# Patient Record
Sex: MALE | Race: WHITE | NOT HISPANIC OR LATINO | Employment: UNEMPLOYED | URBAN - METROPOLITAN AREA
[De-identification: names, ages, dates, MRNs, and addresses within clinical notes are randomized per-mention and may not be internally consistent; named-entity substitution may affect disease eponyms.]

---

## 2019-03-06 ENCOUNTER — APPOINTMENT (EMERGENCY)
Dept: CT IMAGING | Facility: HOSPITAL | Age: 39
DRG: 720 | End: 2019-03-06
Payer: COMMERCIAL

## 2019-03-06 ENCOUNTER — HOSPITAL ENCOUNTER (INPATIENT)
Facility: HOSPITAL | Age: 39
LOS: 5 days | DRG: 720 | End: 2019-03-12
Attending: EMERGENCY MEDICINE | Admitting: INTERNAL MEDICINE
Payer: COMMERCIAL

## 2019-03-06 ENCOUNTER — APPOINTMENT (EMERGENCY)
Dept: RADIOLOGY | Facility: HOSPITAL | Age: 39
DRG: 720 | End: 2019-03-06
Payer: COMMERCIAL

## 2019-03-06 DIAGNOSIS — R78.81 POSITIVE BLOOD CULTURES: ICD-10-CM

## 2019-03-06 DIAGNOSIS — I21.4 NSTEMI (NON-ST ELEVATED MYOCARDIAL INFARCTION) (HCC): ICD-10-CM

## 2019-03-06 DIAGNOSIS — J69.0 ASPIRATION PNEUMONIA (HCC): ICD-10-CM

## 2019-03-06 DIAGNOSIS — I26.99 PULMONARY EMBOLUS (HCC): ICD-10-CM

## 2019-03-06 DIAGNOSIS — F19.10 POLYSUBSTANCE ABUSE (HCC): Primary | ICD-10-CM

## 2019-03-06 LAB
ALBUMIN SERPL BCP-MCNC: 3.7 G/DL (ref 3.5–5)
ALP SERPL-CCNC: 82 U/L (ref 46–116)
ALT SERPL W P-5'-P-CCNC: 119 U/L (ref 12–78)
AMPHETAMINES SERPL QL SCN: POSITIVE
ANION GAP BLD CALC-SCNC: 19 MMOL/L (ref 4–13)
ANION GAP SERPL CALCULATED.3IONS-SCNC: 13 MMOL/L (ref 4–13)
APAP SERPL-MCNC: <2 UG/ML (ref 10–30)
APTT PPP: 31 SECONDS (ref 26–38)
ARTERIAL PATENCY WRIST A: YES
AST SERPL W P-5'-P-CCNC: 108 U/L (ref 5–45)
ATRIAL RATE: 121 BPM
BACTERIA UR QL AUTO: ABNORMAL /HPF
BARBITURATES UR QL: NEGATIVE
BASE EXCESS BLDA CALC-SCNC: -0.8 MMOL/L
BASE EXCESS BLDA CALC-SCNC: -1 MMOL/L (ref -2–3)
BASOPHILS # BLD AUTO: 0.08 THOUSANDS/ΜL (ref 0–0.1)
BASOPHILS NFR BLD AUTO: 0 % (ref 0–1)
BENZODIAZ UR QL: NEGATIVE
BILIRUB SERPL-MCNC: 1.2 MG/DL (ref 0.2–1)
BILIRUB UR QL STRIP: NEGATIVE
BUN BLD-MCNC: 12 MG/DL (ref 5–25)
BUN SERPL-MCNC: 13 MG/DL (ref 5–25)
CA-I BLD-SCNC: 1.09 MMOL/L (ref 1.12–1.32)
CA-I BLD-SCNC: 1.1 MMOL/L (ref 1.12–1.32)
CALCIUM SERPL-MCNC: 8.8 MG/DL (ref 8.3–10.1)
CHLORIDE BLD-SCNC: 96 MMOL/L (ref 100–108)
CHLORIDE SERPL-SCNC: 98 MMOL/L (ref 100–108)
CK MB SERPL-MCNC: 8.5 NG/ML (ref 0–5)
CK MB SERPL-MCNC: <1 % (ref 0–2.5)
CK SERPL-CCNC: 1281 U/L (ref 39–308)
CLARITY UR: CLEAR
CO2 SERPL-SCNC: 25 MMOL/L (ref 21–32)
COARSE GRAN CASTS URNS QL MICRO: ABNORMAL /LPF
COCAINE UR QL: NEGATIVE
COLOR UR: YELLOW
CREAT BLD-MCNC: 1.1 MG/DL (ref 0.6–1.3)
CREAT SERPL-MCNC: 1.29 MG/DL (ref 0.6–1.3)
EOSINOPHIL # BLD AUTO: 0.68 THOUSAND/ΜL (ref 0–0.61)
EOSINOPHIL NFR BLD AUTO: 3 % (ref 0–6)
ERYTHROCYTE [DISTWIDTH] IN BLOOD BY AUTOMATED COUNT: 13.3 % (ref 11.6–15.1)
ETHANOL SERPL-MCNC: <3 MG/DL (ref 0–3)
GFR SERPL CREATININE-BSD FRML MDRD: 70 ML/MIN/1.73SQ M
GFR SERPL CREATININE-BSD FRML MDRD: 85 ML/MIN/1.73SQ M
GLUCOSE SERPL-MCNC: 91 MG/DL (ref 65–140)
GLUCOSE SERPL-MCNC: 93 MG/DL (ref 65–140)
GLUCOSE SERPL-MCNC: 96 MG/DL (ref 65–140)
GLUCOSE UR STRIP-MCNC: NEGATIVE MG/DL
HCO3 BLDA-SCNC: 25.1 MMOL/L (ref 22–28)
HCO3 BLDA-SCNC: 25.9 MMOL/L (ref 24–30)
HCT VFR BLD AUTO: 40.8 % (ref 36.5–49.3)
HCT VFR BLD CALC: 43 % (ref 36.5–49.3)
HCT VFR BLD CALC: 44 % (ref 36.5–49.3)
HGB BLD-MCNC: 13.8 G/DL (ref 12–17)
HGB BLDA-MCNC: 14.6 G/DL (ref 12–17)
HGB BLDA-MCNC: 15 G/DL (ref 12–17)
HGB UR QL STRIP.AUTO: ABNORMAL
HYALINE CASTS #/AREA URNS LPF: ABNORMAL /LPF
IMM GRANULOCYTES # BLD AUTO: 0.18 THOUSAND/UL (ref 0–0.2)
IMM GRANULOCYTES NFR BLD AUTO: 1 % (ref 0–2)
INR PPP: 1.15 (ref 0.86–1.17)
KETONES UR STRIP-MCNC: NEGATIVE MG/DL
LEUKOCYTE ESTERASE UR QL STRIP: NEGATIVE
LYMPHOCYTES # BLD AUTO: 4.68 THOUSANDS/ΜL (ref 0.6–4.47)
LYMPHOCYTES NFR BLD AUTO: 23 % (ref 14–44)
MAGNESIUM SERPL-MCNC: 1.8 MG/DL (ref 1.6–2.6)
MCH RBC QN AUTO: 30.3 PG (ref 26.8–34.3)
MCHC RBC AUTO-ENTMCNC: 33.8 G/DL (ref 31.4–37.4)
MCV RBC AUTO: 90 FL (ref 82–98)
METHADONE UR QL: NEGATIVE
MONOCYTES # BLD AUTO: 1.36 THOUSAND/ΜL (ref 0.17–1.22)
MONOCYTES NFR BLD AUTO: 7 % (ref 4–12)
MUCOUS THREADS UR QL AUTO: ABNORMAL
NASAL CANNULA: 2
NEUTROPHILS # BLD AUTO: 13.22 THOUSANDS/ΜL (ref 1.85–7.62)
NEUTS SEG NFR BLD AUTO: 66 % (ref 43–75)
NITRITE UR QL STRIP: NEGATIVE
NON-SQ EPI CELLS URNS QL MICRO: ABNORMAL /HPF
NRBC BLD AUTO-RTO: 0 /100 WBCS
O2 CT BLDA-SCNC: 18.4 ML/DL (ref 16–23)
OPIATES UR QL SCN: POSITIVE
OXYHGB MFR BLDA: 90.2 % (ref 94–97)
P AXIS: 33 DEGREES
PCO2 BLD: 26 MMOL/L (ref 21–32)
PCO2 BLD: 27 MMOL/L (ref 21–32)
PCO2 BLD: 52.6 MM HG (ref 42–50)
PCO2 BLDA: 46 MM HG (ref 36–44)
PCP UR QL: NEGATIVE
PH BLD: 7.3 [PH] (ref 7.3–7.4)
PH BLDA: 7.36 [PH] (ref 7.35–7.45)
PH UR STRIP.AUTO: 6 [PH]
PHOSPHATE SERPL-MCNC: 6.1 MG/DL (ref 2.7–4.5)
PLATELET # BLD AUTO: 241 THOUSANDS/UL (ref 149–390)
PMV BLD AUTO: 11.2 FL (ref 8.9–12.7)
PO2 BLD: 47 MM HG (ref 35–45)
PO2 BLDA: 72.3 MM HG (ref 75–129)
POTASSIUM BLD-SCNC: 3.4 MMOL/L (ref 3.5–5.3)
POTASSIUM BLD-SCNC: 3.4 MMOL/L (ref 3.5–5.3)
POTASSIUM SERPL-SCNC: 3.5 MMOL/L (ref 3.5–5.3)
PR INTERVAL: 128 MS
PROT SERPL-MCNC: 7.9 G/DL (ref 6.4–8.2)
PROT UR STRIP-MCNC: ABNORMAL MG/DL
PROTHROMBIN TIME: 14.6 SECONDS (ref 11.8–14.2)
QRS AXIS: 103 DEGREES
QRSD INTERVAL: 92 MS
QT INTERVAL: 346 MS
QTC INTERVAL: 491 MS
RBC # BLD AUTO: 4.55 MILLION/UL (ref 3.88–5.62)
RBC #/AREA URNS AUTO: ABNORMAL /HPF
SALICYLATES SERPL-MCNC: <3 MG/DL (ref 3–20)
SAO2 % BLD FROM PO2: 77 % (ref 95–98)
SODIUM BLD-SCNC: 135 MMOL/L (ref 136–145)
SODIUM BLD-SCNC: 136 MMOL/L (ref 136–145)
SODIUM SERPL-SCNC: 136 MMOL/L (ref 136–145)
SP GR UR STRIP.AUTO: 1.02 (ref 1–1.03)
SPECIMEN SOURCE: ABNORMAL
T WAVE AXIS: 10 DEGREES
THC UR QL: NEGATIVE
TROPONIN I SERPL-MCNC: 0.12 NG/ML
TSH SERPL DL<=0.05 MIU/L-ACNC: 8.32 UIU/ML (ref 0.36–3.74)
UROBILINOGEN UR QL STRIP.AUTO: 0.2 E.U./DL
VENTRICULAR RATE: 121 BPM
WBC # BLD AUTO: 20.2 THOUSAND/UL (ref 4.31–10.16)
WBC #/AREA URNS AUTO: ABNORMAL /HPF

## 2019-03-06 PROCEDURE — 36415 COLL VENOUS BLD VENIPUNCTURE: CPT | Performed by: PHYSICIAN ASSISTANT

## 2019-03-06 PROCEDURE — 80320 DRUG SCREEN QUANTALCOHOLS: CPT | Performed by: PHYSICIAN ASSISTANT

## 2019-03-06 PROCEDURE — 71045 X-RAY EXAM CHEST 1 VIEW: CPT

## 2019-03-06 PROCEDURE — 81001 URINALYSIS AUTO W/SCOPE: CPT | Performed by: PHYSICIAN ASSISTANT

## 2019-03-06 PROCEDURE — 84100 ASSAY OF PHOSPHORUS: CPT | Performed by: PHYSICIAN ASSISTANT

## 2019-03-06 PROCEDURE — 84295 ASSAY OF SERUM SODIUM: CPT

## 2019-03-06 PROCEDURE — 80307 DRUG TEST PRSMV CHEM ANLYZR: CPT | Performed by: PHYSICIAN ASSISTANT

## 2019-03-06 PROCEDURE — 87147 CULTURE TYPE IMMUNOLOGIC: CPT | Performed by: PHYSICIAN ASSISTANT

## 2019-03-06 PROCEDURE — 85025 COMPLETE CBC W/AUTO DIFF WBC: CPT | Performed by: PHYSICIAN ASSISTANT

## 2019-03-06 PROCEDURE — 93010 ELECTROCARDIOGRAM REPORT: CPT | Performed by: INTERNAL MEDICINE

## 2019-03-06 PROCEDURE — 85014 HEMATOCRIT: CPT

## 2019-03-06 PROCEDURE — 70450 CT HEAD/BRAIN W/O DYE: CPT

## 2019-03-06 PROCEDURE — 82330 ASSAY OF CALCIUM: CPT

## 2019-03-06 PROCEDURE — 87186 SC STD MICRODIL/AGAR DIL: CPT | Performed by: PHYSICIAN ASSISTANT

## 2019-03-06 PROCEDURE — 83735 ASSAY OF MAGNESIUM: CPT | Performed by: PHYSICIAN ASSISTANT

## 2019-03-06 PROCEDURE — 80329 ANALGESICS NON-OPIOID 1 OR 2: CPT | Performed by: PHYSICIAN ASSISTANT

## 2019-03-06 PROCEDURE — 36600 WITHDRAWAL OF ARTERIAL BLOOD: CPT

## 2019-03-06 PROCEDURE — 84132 ASSAY OF SERUM POTASSIUM: CPT

## 2019-03-06 PROCEDURE — 85730 THROMBOPLASTIN TIME PARTIAL: CPT | Performed by: PHYSICIAN ASSISTANT

## 2019-03-06 PROCEDURE — 96375 TX/PRO/DX INJ NEW DRUG ADDON: CPT

## 2019-03-06 PROCEDURE — 85610 PROTHROMBIN TIME: CPT | Performed by: PHYSICIAN ASSISTANT

## 2019-03-06 PROCEDURE — 84484 ASSAY OF TROPONIN QUANT: CPT | Performed by: PHYSICIAN ASSISTANT

## 2019-03-06 PROCEDURE — 84443 ASSAY THYROID STIM HORMONE: CPT | Performed by: PHYSICIAN ASSISTANT

## 2019-03-06 PROCEDURE — 82553 CREATINE MB FRACTION: CPT | Performed by: PHYSICIAN ASSISTANT

## 2019-03-06 PROCEDURE — 82550 ASSAY OF CK (CPK): CPT | Performed by: PHYSICIAN ASSISTANT

## 2019-03-06 PROCEDURE — 96361 HYDRATE IV INFUSION ADD-ON: CPT

## 2019-03-06 PROCEDURE — 93005 ELECTROCARDIOGRAM TRACING: CPT

## 2019-03-06 PROCEDURE — 99285 EMERGENCY DEPT VISIT HI MDM: CPT

## 2019-03-06 PROCEDURE — 71275 CT ANGIOGRAPHY CHEST: CPT

## 2019-03-06 PROCEDURE — 96365 THER/PROPH/DIAG IV INF INIT: CPT

## 2019-03-06 PROCEDURE — 80047 BASIC METABLC PNL IONIZED CA: CPT

## 2019-03-06 PROCEDURE — 82947 ASSAY GLUCOSE BLOOD QUANT: CPT

## 2019-03-06 PROCEDURE — 82803 BLOOD GASES ANY COMBINATION: CPT

## 2019-03-06 PROCEDURE — 82805 BLOOD GASES W/O2 SATURATION: CPT | Performed by: PHYSICIAN ASSISTANT

## 2019-03-06 PROCEDURE — 80053 COMPREHEN METABOLIC PANEL: CPT | Performed by: PHYSICIAN ASSISTANT

## 2019-03-06 PROCEDURE — 87040 BLOOD CULTURE FOR BACTERIA: CPT | Performed by: PHYSICIAN ASSISTANT

## 2019-03-06 RX ORDER — ONDANSETRON 2 MG/ML
4 INJECTION INTRAMUSCULAR; INTRAVENOUS ONCE
Status: COMPLETED | OUTPATIENT
Start: 2019-03-06 | End: 2019-03-06

## 2019-03-06 RX ORDER — HEPARIN SODIUM 1000 [USP'U]/ML
8400 INJECTION, SOLUTION INTRAVENOUS; SUBCUTANEOUS ONCE
Status: COMPLETED | OUTPATIENT
Start: 2019-03-06 | End: 2019-03-07

## 2019-03-06 RX ORDER — HEPARIN SODIUM 1000 [USP'U]/ML
4200 INJECTION, SOLUTION INTRAVENOUS; SUBCUTANEOUS AS NEEDED
Status: ACTIVE | OUTPATIENT
Start: 2019-03-06 | End: 2019-03-09

## 2019-03-06 RX ORDER — HEPARIN SODIUM 10000 [USP'U]/100ML
3-30 INJECTION, SOLUTION INTRAVENOUS
Status: DISCONTINUED | OUTPATIENT
Start: 2019-03-06 | End: 2019-03-09

## 2019-03-06 RX ORDER — HEPARIN SODIUM 1000 [USP'U]/ML
8400 INJECTION, SOLUTION INTRAVENOUS; SUBCUTANEOUS AS NEEDED
Status: ACTIVE | OUTPATIENT
Start: 2019-03-06 | End: 2019-03-09

## 2019-03-06 RX ADMIN — IOHEXOL 85 ML: 350 INJECTION, SOLUTION INTRAVENOUS at 22:56

## 2019-03-06 RX ADMIN — SODIUM CHLORIDE 1000 ML: 0.9 INJECTION, SOLUTION INTRAVENOUS at 21:43

## 2019-03-06 RX ADMIN — METRONIDAZOLE 500 MG: 500 INJECTION, SOLUTION INTRAVENOUS at 23:58

## 2019-03-06 RX ADMIN — ONDANSETRON 4 MG: 2 INJECTION INTRAMUSCULAR; INTRAVENOUS at 21:50

## 2019-03-07 ENCOUNTER — APPOINTMENT (INPATIENT)
Dept: NON INVASIVE DIAGNOSTICS | Facility: HOSPITAL | Age: 39
DRG: 720 | End: 2019-03-07
Payer: COMMERCIAL

## 2019-03-07 PROBLEM — R77.8 TROPONIN LEVEL ELEVATED: Status: ACTIVE | Noted: 2019-03-07

## 2019-03-07 PROBLEM — I26.99 PULMONARY EMBOLUS (HCC): Status: ACTIVE | Noted: 2019-03-07

## 2019-03-07 PROBLEM — J69.0 ASPIRATION PNEUMONIA (HCC): Status: ACTIVE | Noted: 2019-03-07

## 2019-03-07 PROBLEM — S22.31XA: Status: ACTIVE | Noted: 2019-03-07

## 2019-03-07 PROBLEM — R41.82 ALTERED MENTAL STATUS: Status: ACTIVE | Noted: 2019-03-07

## 2019-03-07 PROBLEM — R79.89 ELEVATED BRAIN NATRIURETIC PEPTIDE (BNP) LEVEL: Status: ACTIVE | Noted: 2019-03-07

## 2019-03-07 PROBLEM — J18.9 PNEUMONIA: Status: ACTIVE | Noted: 2019-03-07

## 2019-03-07 PROBLEM — Z72.0 TOBACCO ABUSE: Status: ACTIVE | Noted: 2019-03-07

## 2019-03-07 LAB
ANION GAP SERPL CALCULATED.3IONS-SCNC: 9 MMOL/L (ref 4–13)
APTT PPP: 103 SECONDS (ref 26–38)
APTT PPP: 73 SECONDS (ref 26–38)
APTT PPP: 98 SECONDS (ref 26–38)
BASOPHILS # BLD AUTO: 0.05 THOUSANDS/ΜL (ref 0–0.1)
BASOPHILS NFR BLD AUTO: 0 % (ref 0–1)
BUN SERPL-MCNC: 10 MG/DL (ref 5–25)
CALCIUM SERPL-MCNC: 8.3 MG/DL (ref 8.3–10.1)
CHLORIDE SERPL-SCNC: 99 MMOL/L (ref 100–108)
CO2 SERPL-SCNC: 25 MMOL/L (ref 21–32)
CREAT SERPL-MCNC: 0.91 MG/DL (ref 0.6–1.3)
EOSINOPHIL # BLD AUTO: 0.55 THOUSAND/ΜL (ref 0–0.61)
EOSINOPHIL NFR BLD AUTO: 4 % (ref 0–6)
ERYTHROCYTE [DISTWIDTH] IN BLOOD BY AUTOMATED COUNT: 13.2 % (ref 11.6–15.1)
GFR SERPL CREATININE-BSD FRML MDRD: 107 ML/MIN/1.73SQ M
GLUCOSE SERPL-MCNC: 103 MG/DL (ref 65–140)
HCT VFR BLD AUTO: 38.9 % (ref 36.5–49.3)
HGB BLD-MCNC: 12.8 G/DL (ref 12–17)
IMM GRANULOCYTES # BLD AUTO: 0.06 THOUSAND/UL (ref 0–0.2)
IMM GRANULOCYTES NFR BLD AUTO: 0 % (ref 0–2)
LYMPHOCYTES # BLD AUTO: 2.9 THOUSANDS/ΜL (ref 0.6–4.47)
LYMPHOCYTES NFR BLD AUTO: 20 % (ref 14–44)
MCH RBC QN AUTO: 29.9 PG (ref 26.8–34.3)
MCHC RBC AUTO-ENTMCNC: 32.9 G/DL (ref 31.4–37.4)
MCV RBC AUTO: 91 FL (ref 82–98)
MONOCYTES # BLD AUTO: 1.4 THOUSAND/ΜL (ref 0.17–1.22)
MONOCYTES NFR BLD AUTO: 10 % (ref 4–12)
NEUTROPHILS # BLD AUTO: 9.79 THOUSANDS/ΜL (ref 1.85–7.62)
NEUTS SEG NFR BLD AUTO: 66 % (ref 43–75)
NRBC BLD AUTO-RTO: 0 /100 WBCS
NT-PROBNP SERPL-MCNC: 1425 PG/ML
PLATELET # BLD AUTO: 200 THOUSANDS/UL (ref 149–390)
PMV BLD AUTO: 11.5 FL (ref 8.9–12.7)
POTASSIUM SERPL-SCNC: 3.7 MMOL/L (ref 3.5–5.3)
RBC # BLD AUTO: 4.28 MILLION/UL (ref 3.88–5.62)
SODIUM SERPL-SCNC: 133 MMOL/L (ref 136–145)
TROPONIN I SERPL-MCNC: 2.48 NG/ML
TROPONIN I SERPL-MCNC: 4.68 NG/ML
TROPONIN I SERPL-MCNC: 4.72 NG/ML
WBC # BLD AUTO: 14.75 THOUSAND/UL (ref 4.31–10.16)

## 2019-03-07 PROCEDURE — 99253 IP/OBS CNSLTJ NEW/EST LOW 45: CPT | Performed by: INTERNAL MEDICINE

## 2019-03-07 PROCEDURE — 85730 THROMBOPLASTIN TIME PARTIAL: CPT | Performed by: PHYSICIAN ASSISTANT

## 2019-03-07 PROCEDURE — 87449 NOS EACH ORGANISM AG IA: CPT | Performed by: PHYSICIAN ASSISTANT

## 2019-03-07 PROCEDURE — 80048 BASIC METABOLIC PNL TOTAL CA: CPT | Performed by: INTERNAL MEDICINE

## 2019-03-07 PROCEDURE — 83880 ASSAY OF NATRIURETIC PEPTIDE: CPT | Performed by: PHYSICIAN ASSISTANT

## 2019-03-07 PROCEDURE — 85730 THROMBOPLASTIN TIME PARTIAL: CPT | Performed by: INTERNAL MEDICINE

## 2019-03-07 PROCEDURE — 84484 ASSAY OF TROPONIN QUANT: CPT | Performed by: INTERNAL MEDICINE

## 2019-03-07 PROCEDURE — 36415 COLL VENOUS BLD VENIPUNCTURE: CPT | Performed by: INTERNAL MEDICINE

## 2019-03-07 PROCEDURE — 99223 1ST HOSP IP/OBS HIGH 75: CPT | Performed by: INTERNAL MEDICINE

## 2019-03-07 PROCEDURE — 85025 COMPLETE CBC W/AUTO DIFF WBC: CPT | Performed by: INTERNAL MEDICINE

## 2019-03-07 PROCEDURE — 93306 TTE W/DOPPLER COMPLETE: CPT | Performed by: INTERNAL MEDICINE

## 2019-03-07 PROCEDURE — 93306 TTE W/DOPPLER COMPLETE: CPT

## 2019-03-07 RX ORDER — METRONIDAZOLE 500 MG/1
500 TABLET ORAL EVERY 8 HOURS SCHEDULED
Status: DISCONTINUED | OUTPATIENT
Start: 2019-03-07 | End: 2019-03-08

## 2019-03-07 RX ORDER — GUAIFENESIN 600 MG
600 TABLET, EXTENDED RELEASE 12 HR ORAL EVERY 12 HOURS SCHEDULED
Status: DISCONTINUED | OUTPATIENT
Start: 2019-03-07 | End: 2019-03-12 | Stop reason: HOSPADM

## 2019-03-07 RX ORDER — ASPIRIN 81 MG/1
81 TABLET, CHEWABLE ORAL DAILY
Status: DISCONTINUED | OUTPATIENT
Start: 2019-03-08 | End: 2019-03-12 | Stop reason: HOSPADM

## 2019-03-07 RX ORDER — ATORVASTATIN CALCIUM 10 MG/1
10 TABLET, FILM COATED ORAL
Status: DISCONTINUED | OUTPATIENT
Start: 2019-03-07 | End: 2019-03-12 | Stop reason: HOSPADM

## 2019-03-07 RX ADMIN — HEPARIN SODIUM 8400 UNITS: 1000 INJECTION, SOLUTION INTRAVENOUS; SUBCUTANEOUS at 00:28

## 2019-03-07 RX ADMIN — GUAIFENESIN 600 MG: 600 TABLET, EXTENDED RELEASE ORAL at 09:31

## 2019-03-07 RX ADMIN — HEPARIN SODIUM AND DEXTROSE 18 UNITS/KG/HR: 10000; 5 INJECTION INTRAVENOUS at 00:28

## 2019-03-07 RX ADMIN — GUAIFENESIN 600 MG: 600 TABLET, EXTENDED RELEASE ORAL at 01:01

## 2019-03-07 RX ADMIN — NICOTINE 7 MG: 7 PATCH TRANSDERMAL at 15:56

## 2019-03-07 RX ADMIN — METRONIDAZOLE 500 MG: 500 TABLET ORAL at 21:28

## 2019-03-07 RX ADMIN — ATORVASTATIN CALCIUM 10 MG: 10 TABLET, FILM COATED ORAL at 18:07

## 2019-03-07 RX ADMIN — GUAIFENESIN 600 MG: 600 TABLET, EXTENDED RELEASE ORAL at 21:28

## 2019-03-07 RX ADMIN — SODIUM CHLORIDE 1000 ML: 0.9 INJECTION, SOLUTION INTRAVENOUS at 02:09

## 2019-03-07 RX ADMIN — METRONIDAZOLE 500 MG: 500 TABLET ORAL at 05:45

## 2019-03-07 RX ADMIN — HEPARIN SODIUM AND DEXTROSE 16 UNITS/KG/HR: 10000; 5 INJECTION INTRAVENOUS at 12:35

## 2019-03-07 RX ADMIN — CEFTRIAXONE SODIUM 1000 MG: 10 INJECTION, POWDER, FOR SOLUTION INTRAVENOUS at 01:00

## 2019-03-07 RX ADMIN — METRONIDAZOLE 500 MG: 500 TABLET ORAL at 15:02

## 2019-03-07 NOTE — ASSESSMENT & PLAN NOTE
IV Rocephin and Flagyl  Guaifenesin  O2 sat monitoring with supplemental O2 p r n  To maintain sat 90% or greater  Supportive care  CBC in the a m   To monitor leukocytosis

## 2019-03-07 NOTE — ASSESSMENT & PLAN NOTE
· BNP >1,400  · Pt appears euvolemic on exam   · CTA with evidence of multifocal pneumonia vs  Pulmonary edema  · Obtain ECHO  · Cardiology following  · Telemetry monitoring  · Hold off on any diuresis for now pending ECHO results  · Monitor

## 2019-03-07 NOTE — ASSESSMENT & PLAN NOTE
· NSTEMI type I vs  Type II TBD, could be secondary to acute PE vs  Underlying CAD  · Troponins 0 12/2 48/4 72/4 68  · Cardiology following,  · Obtain ECHO  · Continue heparin gtt for now  · Will likely need ischemic work up prior to discharge  · Denies any chest pain or shortness of breath currently

## 2019-03-07 NOTE — ED PROVIDER NOTES
History  Chief Complaint   Patient presents with    Altered Mental Status     Pt presents via EMS after taking "a couple of oxys" from a friend     42 yo male patient here for altered mental status  Being brought in by EMS  Family called  Found him acting unusual  Not himself  Seemed disoriented  They thought he was having seizure activity, however when EMS further inquired what they meant, they state he was walking around the kitchen "shaking his arms"  He reports taking 2 oxycodone that he "bought off the streets" and a "couple of beers" earlier  Denies other illicit drug use  He admits to feeling nauseous and short of breath  States he felt like he couldn't catch his breath earlier today  Per EMS he was hypoxic to 70% on room air with good pleth upon their arrival  Vomited  Here in ED he is above 92% on room air  Pt denies trauma  History provided by:  Patient   used: No    Altered Mental Status   Presenting symptoms: behavior changes and confusion    Presenting symptoms: no combativeness, no disorientation, no lethargy, no memory loss, no partial responsiveness and no unresponsiveness    Severity:  Severe  Most recent episode: Today  Episode history:  Continuous  Duration: unclear    Timing:  Constant  Progression:  Unchanged  Chronicity:  New  Context: alcohol use and drug use (oxycodone)    Context: not dementia, not head injury, not homeless, taking medications as prescribed, not nursing home resident, not recent change in medication, not recent illness and not recent infection    Associated symptoms: abnormal movement, agitation, difficulty breathing, nausea and vomiting    Associated symptoms: no abdominal pain, no bladder incontinence, no decreased appetite, no depression, no eye deviation, no fever, no hallucinations, no headaches, no light-headedness, no palpitations, no rash, no seizures, no slurred speech, no suicidal behavior, no visual change and no weakness        None Past Medical History:   Diagnosis Date    Hyperlipidemia        History reviewed  No pertinent surgical history  History reviewed  No pertinent family history  I have reviewed and agree with the history as documented  Social History     Tobacco Use    Smoking status: Current Every Day Smoker     Types: Cigarettes    Smokeless tobacco: Never Used   Substance Use Topics    Alcohol use: Yes     Alcohol/week: 1 8 oz     Types: 3 Cans of beer per week     Frequency: 2-4 times a month     Drinks per session: 1 or 2     Binge frequency: Less than monthly    Drug use: Not on file        Review of Systems   Constitutional: Negative for activity change, appetite change, chills, decreased appetite, diaphoresis, fatigue, fever and unexpected weight change  HENT: Negative for congestion, rhinorrhea, sinus pressure, sore throat and trouble swallowing  Eyes: Negative for photophobia and visual disturbance  Respiratory: Positive for shortness of breath  Negative for apnea, cough, choking, chest tightness, wheezing and stridor  Cardiovascular: Negative for chest pain, palpitations and leg swelling  Gastrointestinal: Positive for nausea and vomiting  Negative for abdominal distention, abdominal pain, blood in stool, constipation and diarrhea  Genitourinary: Negative for bladder incontinence, decreased urine volume, difficulty urinating, dysuria, enuresis, flank pain, frequency, hematuria and urgency  Musculoskeletal: Negative for arthralgias, myalgias, neck pain and neck stiffness  Skin: Negative for color change, pallor, rash and wound  Allergic/Immunologic: Negative  Neurological: Negative for dizziness, tremors, seizures, syncope, weakness, light-headedness, numbness and headaches  Hematological: Negative  Psychiatric/Behavioral: Positive for agitation and confusion  Negative for behavioral problems, hallucinations and memory loss     All other systems reviewed and are negative  Physical Exam  Physical Exam   Constitutional: He is oriented to person, place, and time  He appears well-developed and well-nourished  Non-toxic appearance  He does not have a sickly appearance  He does not appear ill  No distress  HENT:   Head: Normocephalic and atraumatic  Eyes: Pupils are equal, round, and reactive to light  EOM and lids are normal    Neck: Normal range of motion  Neck supple  Cardiovascular: Regular rhythm, S1 normal, S2 normal, normal heart sounds, intact distal pulses and normal pulses  Tachycardia present  Exam reveals no gallop, no distant heart sounds, no friction rub and no decreased pulses  No murmur heard  Pulses:       Radial pulses are 2+ on the right side, and 2+ on the left side  Pulmonary/Chest: Effort normal and breath sounds normal  No accessory muscle usage  No apnea, no tachypnea and no bradypnea  No respiratory distress  He has no decreased breath sounds  He has no wheezes  He has no rhonchi  He has no rales  Abdominal: Soft  Normal appearance  He exhibits no distension  There is no tenderness  There is no rigidity, no rebound and no guarding  Musculoskeletal: Normal range of motion  He exhibits no edema, tenderness or deformity  Neurological: He is alert and oriented to person, place, and time  No cranial nerve deficit  GCS eye subscore is 4  GCS verbal subscore is 5  GCS motor subscore is 6  GCS 15  AAOx3  CN II-XII grossly intact  No focal neuro deficits  Spastic movements upper and lower extremities  Able to follow commands and move all four extremities without difficulty  Skin: Skin is warm, dry and intact  No rash noted  He is not diaphoretic  No erythema  No pallor  Psychiatric: His speech is normal    Nursing note and vitals reviewed        Vital Signs  ED Triage Vitals   Temperature Pulse Respirations Blood Pressure SpO2   03/06/19 2208 03/06/19 2132 03/06/19 2132 03/06/19 2133 03/06/19 2132   97 9 °F (36 6 °C) (!) 141 22 141/80 92 %      Temp Source Heart Rate Source Patient Position - Orthostatic VS BP Location FiO2 (%)   03/06/19 2208 03/06/19 2132 03/06/19 2133 03/06/19 2133 --   Oral Monitor Lying Left arm       Pain Score       03/07/19 0045       No Pain           Vitals:    03/07/19 0731 03/07/19 1100 03/07/19 1516 03/07/19 1622   BP: 114/64 114/74 118/80 134/63   Pulse: 91 99 90 95   Patient Position - Orthostatic VS: Sitting Sitting Lying Lying       Visual Acuity      ED Medications  Medications   heparin (porcine) 25,000 units in 250 mL infusion (premix) (14 Units/kg/hr × 105 kg (Order-Specific) Intravenous Rate/Dose Change 3/7/19 1316)   heparin (porcine) injection 8,400 Units (has no administration in time range)   heparin (porcine) injection 4,200 Units (has no administration in time range)   metroNIDAZOLE (FLAGYL) tablet 500 mg (500 mg Oral Given 3/7/19 1502)   ceftriaxone (ROCEPHIN) 1 g/50 mL in dextrose IVPB (has no administration in time range)   guaiFENesin (MUCINEX) 12 hr tablet 600 mg (600 mg Oral Given 3/7/19 0931)   nicotine (NICODERM CQ) 7 mg/24hr TD 24 hr patch 7 mg (7 mg Transdermal Medication Applied 3/7/19 1556)   sodium chloride 0 9 % bolus 1,000 mL (0 mL Intravenous Stopped 3/6/19 2358)   ondansetron (ZOFRAN) injection 4 mg (4 mg Intravenous Given 3/6/19 2150)   iohexol (OMNIPAQUE) 350 MG/ML injection (MULTI-DOSE) 85 mL (85 mL Intravenous Given 3/6/19 2256)   ceftriaxone (ROCEPHIN) 1 g/50 mL in dextrose IVPB (0 mg Intravenous Stopped 3/7/19 0518)   metroNIDAZOLE (FLAGYL) IVPB (premix) 500 mg (0 mg Intravenous Stopped 3/7/19 0518)   heparin (porcine) injection 8,400 Units (8,400 Units Intravenous Given 3/7/19 0028)   sodium chloride 0 9 % bolus 1,000 mL (0 mL Intravenous Stopped 3/7/19 0519)       Diagnostic Studies  Results Reviewed     Procedure Component Value Units Date/Time    Sputum culture and Gram stain [[id number]]     Lab Status:  No result Specimen:  Sputum     Strep Pneumoniae, Urine [[id number]]     Lab Status:  No result Specimen:  Urine     Legionella antigen, urine [[id number]]     Lab Status:  No result Specimen:  Urine     APTT [[id number]]  (Abnormal) Collected:  03/07/19 1242    Lab Status:  Final result Specimen:  Blood from Arm, Right Updated:  03/07/19 1308      seconds     Troponin I [[id number]]  (Abnormal) Collected:  03/07/19 0944    Lab Status:  Final result Specimen:  Blood from Hand, Right Updated:  03/07/19 1054     Troponin I 4 68 ng/mL     NT-BNP PRO [[id number]]  (Abnormal) Collected:  03/07/19 0944    Lab Status:  Final result Specimen:  Blood from Hand, Right Updated:  03/07/19 1050     NT-proBNP 1,425 pg/mL     Troponin I [[id number]]  (Abnormal) Collected:  03/07/19 0634    Lab Status:  Final result Specimen:  Blood from Arm, Right Updated:  03/07/19 0746     Troponin I 4 72 ng/mL     APTT [[id number]]  (Abnormal) Collected:  03/07/19 0634    Lab Status:  Final result Specimen:  Blood from Arm, Right Updated:  03/07/19 0714     PTT 98 seconds     Basic metabolic panel [[id number]]  (Abnormal) Collected:  03/07/19 0634    Lab Status:  Final result Specimen:  Blood from Arm, Right Updated:  03/07/19 6323     Sodium 133 mmol/L      Potassium 3 7 mmol/L      Chloride 99 mmol/L      CO2 25 mmol/L      ANION GAP 9 mmol/L      BUN 10 mg/dL      Creatinine 0 91 mg/dL      Glucose 103 mg/dL      Calcium 8 3 mg/dL      eGFR 107 ml/min/1 73sq m     Narrative:       National Kidney Disease Education Program recommendations are as follows:  GFR calculation is accurate only with a steady state creatinine  Chronic Kidney disease less than 60 ml/min/1 73 sq  meters  Kidney failure less than 15 ml/min/1 73 sq  meters      CBC and differential [[id number]]  (Abnormal) Collected:  03/07/19 0634    Lab Status:  Final result Specimen:  Blood from Arm, Right Updated:  03/07/19 0649     WBC 14 75 Thousand/uL      RBC 4 28 Million/uL      Hemoglobin 12 8 g/dL      Hematocrit 38 9 %      MCV 91 fL MCH 29 9 pg      MCHC 32 9 g/dL      RDW 13 2 %      MPV 11 5 fL      Platelets 382 Thousands/uL      nRBC 0 /100 WBCs      Neutrophils Relative 66 %      Immat GRANS % 0 %      Lymphocytes Relative 20 %      Monocytes Relative 10 %      Eosinophils Relative 4 %      Basophils Relative 0 %      Neutrophils Absolute 9 79 Thousands/µL      Immature Grans Absolute 0 06 Thousand/uL      Lymphocytes Absolute 2 90 Thousands/µL      Monocytes Absolute 1 40 Thousand/µL      Eosinophils Absolute 0 55 Thousand/µL      Basophils Absolute 0 05 Thousands/µL     Troponin I [[id number]]  (Abnormal) Collected:  03/07/19 0151    Lab Status:  Final result Specimen:  Blood from Arm, Right Updated:  03/07/19 0226     Troponin I 2 48 ng/mL     Blood culture #2 [[id number]] Collected:  03/06/19 2338    Lab Status: In process Specimen:  Blood from Arm, Left Updated:  03/06/19 2341    Blood culture #1 [[id number]] Collected:  03/06/19 2338    Lab Status: In process Specimen:  Blood from Arm, Right Updated:  03/06/19 2341    CKMB [[id number]]  (Abnormal) Collected:  03/06/19 2141    Lab Status:  Final result Specimen:  Blood from Arm, Right Updated:  03/06/19 2226     CK-MB Index <1 0 %      CK-MB 8 5 ng/mL     TSH [[id number]]  (Abnormal) Collected:  03/06/19 2141    Lab Status:  Final result Specimen:  Blood from Arm, Right Updated:  03/06/19 2225     TSH 3RD GENERATON 8 323 uIU/mL     Narrative:       Patients undergoing fluorescein dye angiography may retain small amounts of fluorescein in the body for 48-72 hours post procedure  Samples containing fluorescein can produce falsely depressed TSH values  If the patient had this procedure,a specimen should be resubmitted post fluorescein clearance      Magnesium [[id number]]  (Normal) Collected:  03/06/19 2141    Lab Status:  Final result Specimen:  Blood from Arm, Right Updated:  03/06/19 2225     Magnesium 1 8 mg/dL     Phosphorus [[id number]]  (Abnormal) Collected:  03/06/19 2141    Lab Status:  Final result Specimen:  Blood from Arm, Right Updated:  03/06/19 2225     Phosphorus 6 1 mg/dL     CK Total with Reflex CKMB [[id number]]  (Abnormal) Collected:  03/06/19 2141    Lab Status:  Final result Specimen:  Blood from Arm, Right Updated:  03/06/19 2225     Total CK 4,858 U/L     Salicylate level [[id number]]  (Abnormal) Collected:  03/06/19 2141    Lab Status:  Final result Specimen:  Blood from Arm, Right Updated:  93/75/52 7837     Salicylate Lvl <3 mg/dL     Acetaminophen level [[id number]]  (Abnormal) Collected:  03/06/19 2141    Lab Status:  Final result Specimen:  Blood from Arm, Right Updated:  03/06/19 2225     Acetaminophen Level <2 0 ug/mL     Blood gas, arterial [[id number]]  (Abnormal) Collected:  03/06/19 2213    Lab Status:  Final result Specimen:  Blood, Arterial from Radial, Left Updated:  03/06/19 2221     pH, Arterial 7 355     pCO2, Arterial 46 0 mm Hg      pO2, Arterial 72 3 mm Hg      HCO3, Arterial 25 1 mmol/L      Base Excess, Arterial -0 8 mmol/L      O2 Content, Arterial 18 4 mL/dL      O2 HGB,Arterial  90 2 %      SOURCE Radial, Left     CATHERINE TEST Yes     Nasal Cannula 2    Troponin I [[id number]]  (Abnormal) Collected:  03/06/19 2141    Lab Status:  Final result Specimen:  Blood from Arm, Right Updated:  03/06/19 2215     Troponin I 0 12 ng/mL     Ethanol [[id number]]  (Normal) Collected:  03/06/19 2141    Lab Status:  Final result Specimen:  Blood from Arm, Right Updated:  03/06/19 2214     Ethanol Lvl <3 mg/dL     Urine Microscopic [[id number]]  (Abnormal) Collected:  03/06/19 2151    Lab Status:  Final result Specimen:  Urine, Clean Catch Updated:  03/06/19 2207     RBC, UA 0-1 /hpf      WBC, UA 0-1 /hpf      Epithelial Cells Occasional /hpf      Bacteria, UA Occasional /hpf      Hyaline Casts, UA 1-2 /lpf      COARSE GRANULAR CASTS 0-1 /lpf      MUCUS THREADS Occasional    Comprehensive metabolic panel [[id number]]  (Abnormal) Collected:  03/06/19 2141    Lab Status:  Final result Specimen:  Blood from Arm, Right Updated:  03/06/19 2207     Sodium 136 mmol/L      Potassium 3 5 mmol/L      Chloride 98 mmol/L      CO2 25 mmol/L      ANION GAP 13 mmol/L      BUN 13 mg/dL      Creatinine 1 29 mg/dL      Glucose 96 mg/dL      Calcium 8 8 mg/dL       U/L       U/L      Alkaline Phosphatase 82 U/L      Total Protein 7 9 g/dL      Albumin 3 7 g/dL      Total Bilirubin 1 20 mg/dL      eGFR 70 ml/min/1 73sq m     Narrative:       National Kidney Disease Education Program recommendations are as follows:  GFR calculation is accurate only with a steady state creatinine  Chronic Kidney disease less than 60 ml/min/1 73 sq  meters  Kidney failure less than 15 ml/min/1 73 sq  meters  Rapid drug screen, urine [[id number]]  (Abnormal) Collected:  03/06/19 2151    Lab Status:  Final result Specimen:  Urine, Clean Catch Updated:  03/06/19 2205     Amph/Meth UR Positive     Barbiturate Ur Negative     Benzodiazepine Urine Negative     Cocaine Urine Negative     Methadone Urine Negative     Opiate Urine Positive     PCP Ur Negative     THC Urine Negative    Narrative:       Presumptive report  If requested, specimen will be sent to reference lab for confirmation  FOR MEDICAL PURPOSES ONLY  IF CONFIRMATION NEEDED PLEASE CONTACT THE LAB WITHIN 5 DAYS    Drug Screen Cutoff Levels:  AMPHETAMINE/METHAMPHETAMINES  1000 ng/mL  BARBITURATES     200 ng/mL  BENZODIAZEPINES     200 ng/mL  COCAINE      300 ng/mL  METHADONE      300 ng/mL  OPIATES      300 ng/mL  PHENCYCLIDINE     25 ng/mL  THC       50 ng/mL    Protime-INR [[id number]]  (Abnormal) Collected:  03/06/19 2141    Lab Status:  Final result Specimen:  Blood from Arm, Right Updated:  03/06/19 2159     Protime 14 6 seconds      INR 1 15    APTT [[id number]]  (Normal) Collected:  03/06/19 2141    Lab Status:  Final result Specimen:  Blood from Arm, Right Updated:  03/06/19 2159     PTT 31 seconds     UA w Reflex to Microscopic w Reflex to Culture [[id number]]  (Abnormal) Collected:  03/06/19 2151    Lab Status:  Final result Specimen:  Urine, Clean Catch Updated:  03/06/19 2157     Color, UA Yellow     Clarity, UA Clear     Specific Gravity, UA 1 025     pH, UA 6 0     Leukocytes, UA Negative     Nitrite, UA Negative     Protein, UA 30 (1+) mg/dl      Glucose, UA Negative mg/dl      Ketones, UA Negative mg/dl      Urobilinogen, UA 0 2 E U /dl      Bilirubin, UA Negative     Blood, UA Trace-Intact    POCT Blood Gas (CG8+) [[id number]]  (Abnormal) Collected:  03/06/19 2148    Lab Status:  Final result Specimen:  Venous Updated:  03/06/19 2153     ph, Jose Luis ISTAT 7 300     pCO2, Jose Luis i-STAT 52 6 mm HG      pO2, Jose Luis i-STAT 47 0 mm HG      BE, i-STAT -1 mmol/L      HCO3, Jose Luis i-STAT 25 9 mmol/L      CO2, i-STAT 27 mmol/L      O2 Sat, i-STAT 77 %      SODIUM, I-STAT 135 mmol/l      Potassium, i-STAT 3 4 mmol/L      Calcium, Ionized i-STAT 1 10 mmol/L      Hct, i-STAT 44 %      Hgb, i-STAT 15 0 g/dl      Glucose, i-STAT 91 mg/dl      Specimen Type VENOUS    POCT Chem 8+ [[id number]]  (Abnormal) Collected:  03/06/19 2146    Lab Status:  Final result Specimen:  Venous Updated:  03/06/19 2151     SODIUM, I-STAT 136 mmol/l      Potassium, i-STAT 3 4 mmol/L      Chloride, istat 96 mmol/L      CO2, i-STAT 26 mmol/L      Anion Gap, i-STAT 19 mmol/L      Calcium, Ionized i-STAT 1 09 mmol/L      BUN, I-STAT 12 mg/dl      Creatinine, i-STAT 1 1 mg/dl      eGFR 85 ml/min/1 73sq m      Glucose, i-STAT 93 mg/dl      Hct, i-STAT 43 %      Hgb, i-STAT 14 6 g/dl      Specimen Type VENOUS    CBC and differential [[id number]]  (Abnormal) Collected:  03/06/19 2141    Lab Status:  Final result Specimen:  Blood from Arm, Right Updated:  03/06/19 2148     WBC 20 20 Thousand/uL      RBC 4 55 Million/uL      Hemoglobin 13 8 g/dL      Hematocrit 40 8 %      MCV 90 fL      MCH 30 3 pg      MCHC 33 8 g/dL      RDW 13 3 %      MPV 11 2 fL      Platelets 662 Thousands/uL      nRBC 0 /100 WBCs Neutrophils Relative 66 %      Immat GRANS % 1 %      Lymphocytes Relative 23 %      Monocytes Relative 7 %      Eosinophils Relative 3 %      Basophils Relative 0 %      Neutrophils Absolute 13 22 Thousands/µL      Immature Grans Absolute 0 18 Thousand/uL      Lymphocytes Absolute 4 68 Thousands/µL      Monocytes Absolute 1 36 Thousand/µL      Eosinophils Absolute 0 68 Thousand/µL      Basophils Absolute 0 08 Thousands/µL                  CTA ED chest PE study   Final Result by Janak Gamez DO (03/06 7303)      Subsegmental branch left lower lobe pulmonary emboli      Diffuse airspace opacities within the right upper lobe and left lung  Findings may represent multifocal pneumonia versus pulmonary edema  Right lateral 7th rib nondisplaced fracture  I personally discussed this study with Hemant Rodriguez on 3/6/2019 at 11:16 PM                         Workstation performed: [id number]         CT head without contrast   Final Result by Madelnie Manuel MD (03/06 3659)      No acute intracranial abnormality  Workstation performed: [id number]         XR chest 1 view portable   Final Result by Bibi Miller MD (03/07 0569)      Multifocal airspace opacities are better appreciated on subsequent CT                 Workstation performed: [id number]                    Procedures  ECG 12 Lead Documentation  Date/Time: 3/6/2019 9:55 PM  Performed by: Syd Perez PA-C  Authorized by: Syd Perez PA-C     Indications / Diagnosis:  Sob  ECG reviewed by me, the ED Provider: yes    Patient location:  ED  Previous ECG:     Previous ECG:  Unavailable    Comparison to cardiac monitor: Yes    Interpretation:     Interpretation: abnormal    Quality:     Tracing quality:  Limited by artifact  Rate:     ECG rate:  121    ECG rate assessment: tachycardic    Rhythm:     Rhythm: sinus tachycardia    Ectopy:     Ectopy: PVCs      PVCs:  Infrequent  QRS:     QRS axis:  Normal    QRS intervals: Normal  Conduction:     Conduction: normal    ST segments:     ST segments:  Normal  T waves:     T waves: normal             Phone Contacts  ED Phone Contact    ED Course  ED Course as of Mar 07 1648   Wed Mar 06, 2019   2217 Troponin I(!): 0 12         HEART Risk Score      Most Recent Value   History  0 Filed at: 03/07/2019 1645   ECG  1 Filed at: 03/07/2019 1645   Age  0 Filed at: 03/07/2019 1645   Risk Factors  1 Filed at: 03/07/2019 1645   Troponin  1 Filed at: 03/07/2019 1645   Heart Score Risk Calculator   History  0 Filed at: 03/07/2019 1645   ECG  1 Filed at: 03/07/2019 1645   Age  0 Filed at: 03/07/2019 1645   Risk Factors  1 Filed at: 03/07/2019 1645   Troponin  1 Filed at: 03/07/2019 1645   HEART Score  3 Filed at: 03/07/2019 1645   HEART Score  3 Filed at: 03/07/2019 1645                Initial Sepsis Screening     Row Name 03/07/19 0014                Is the patient's history suggestive of a new or worsening infection? Yes (Proceed)  (Abnormal)   -CE        Suspected source of infection          Are two or more of the following signs & symptoms of infection both present and new to the patient? No  -CE        Indicate SIRS criteria          If the answer is yes to both questions, suspicion of sepsis is present          If severe sepsis is present AND tissue hypoperfusion perists in the hour after fluid resuscitation or lactate > 4, the patient meets criteria for SEPTIC SHOCK          Are any of the following organ dysfunction criteria present within 6 hours of suspected infection and SIRS criteria that are NOT considered to be chronic conditions?         Organ dysfunction          Date of presentation of severe sepsis          Time of presentation of severe sepsis          Tissue hypoperfusion persists in the hour after crystalloid fluid administration, evidenced, by either:          Was hypotension present within one hour of the conclusion of crystalloid fluid administration?    Date of presentation of septic shock          Time of presentation of septic shock            User Key  (r) = Recorded By, (t) = Taken By, (c) = Cosigned By    [address] Name Provider Yeni Cabello MD Physician                  MDM  Number of Diagnoses or Management Options  Aspiration pneumonia [facility]): new and requires workup  NSTEMI (non-ST elevated myocardial infarction) [facility]): new and requires workup  Polysubstance abuse [facility]): new and requires workup  Pulmonary embolus [facility]): new and requires workup  Diagnosis management comments: DDx including but not limited to: metabolic abnormality, intracranial etiology, cardiac etiology, substance abuse, infectious etiology including UTI, thyroid disease, delirium  Plan: CT head  Portable chest  Cardiac workup  Coma panel  TSH  Amount and/or Complexity of Data Reviewed  Clinical lab tests: ordered and reviewed  Tests in the radiology section of CPT®: ordered and reviewed  Independent visualization of images, tracings, or specimens: yes    Risk of Complications, Morbidity, and/or Mortality  Presenting problems: moderate  Management options: low  General comments: 47 y/o male with AMS and shortness of breath  Portable XR unremarkable  Elevated troponin  Given his tachycardia, sob, and reported hypoxia in field, PE study was obtained  Revealed subsegmental PE  Multifocal pneumonia  Suspect aspiration  Suspect his troponin is related to type II nstemi 2/2 possible syncopal episode and likely a period of apnea/hypoxia  Offers no complaints of chest pain  Heart rate improving with fluids  He has bene given abx for his possible aspiraition pneumonia  He is resting comfortably  He is calm and cooperative  Agreeable with admission       Patient Progress  Patient progress: stable      Disposition  Final diagnoses:   Polysubstance abuse ([facility] )   NSTEMI (non-ST elevated myocardial infarction) ([facility] )   Aspiration pneumonia ([facility] )   Pulmonary embolus ([facility] )     Time reflects when diagnosis was documented in both MDM as applicable and the Disposition within this note     Time User Action Codes Description Comment    3/7/2019 12:12 AM Cristal Outhouse Add [F19 10] Polysubstance abuse ([facility] )     3/7/2019 12:12 AM Danella Duane L Add [I21 4] NSTEMI (non-ST elevated myocardial infarction) ([facility] )     3/7/2019 12:12 AM Danella Duane L Add [J69 0] Aspiration pneumonia ([facility] )     3/7/2019 12:12 AM Danella Duane L Add [I26 99] Pulmonary emboli [facility] [address] )     3/7/2019 12:12 AM Danella Duane L Remove [I26 [id number]] [facility] )     3/7/2019 12:12 AM Cristal Outhouse Add [I26 [id number]] Pulmonary embolus [facility])       ED Disposition     ED Disposition Condition Date/Time Comment    Admit Stable Thu Mar 7, 2019 12:12 AM Case was discussed with Dr Rm Gallegos and the patient's admission status was agreed to be Admission Status: inpatient status to the service of Dr Rm Gallegos   Follow-up Information    None         Patient's Medications    No medications on file     No discharge procedures on file      ED Provider  Electronically Signed by           Filemon Melvin PA-C  03/07/19 8740

## 2019-03-07 NOTE — ASSESSMENT & PLAN NOTE
Cardiology consultation   Cycle cardiac enzymes  Patient currently without any anginal complaints continue to monitor clinically  possibility of PE on scan this may be related to that  Echocardiogram

## 2019-03-07 NOTE — ASSESSMENT & PLAN NOTE
IV heparin for therapeutic anticoagulation  Await Cardiology evaluation of elevated cardiac enzyme prior to starting long-term anticoagulation  O2 sat monitoring with O2 p r n   To maintain sat 90% or greater  Telemetry monitoring

## 2019-03-07 NOTE — CONSULTS
Consultation - Cardiology    Alexis Villalba 46 y o  male MRN: [id number]  Unit/Bed#: ED 11 Encounter: [id number]    Physician Requesting Consult: Janet Stevenson MD    Reason for Consult / Chief Complaint: nstemi    HPI: Alexis Villalba is a 46 y o  male who presents with altered mental status secondary to illicit drug use  Patient states that he took oxys from his friend and had a episode of seizing after  History unclear  Patient UA positive for methamphetamines and opioids  Chest x-ray and labs suggestive of aspiration pneumonia  Does report occasional shortness of breath with productive cough with yellow sputum  Elevated troponins on admission as well  Patient denies any chest pain or discomfort  Patient denies personal history of cardiac illness  Patient states that he had father who had several MIs starting at age 34  Smokes about 5 cigarettes per day  Historical Information   Past Medical History:   Diagnosis Date    Hyperlipidemia      History reviewed  No pertinent surgical history  Social History     Substance and Sexual Activity   Alcohol Use Yes    Alcohol/week: 1 8 oz    Types: 3 Cans of beer per week    Frequency: 2-4 times a month    Drinks per session: 1 or 2    Binge frequency: Less than monthly     Social History     Substance and Sexual Activity   Drug Use Not on file     Social History     Tobacco Use   Smoking Status Current Every Day Smoker    Types: Cigarettes   Smokeless Tobacco Never Used       FAMILY HISTORY:  History reviewed  No pertinent family history      MEDS & ALLERGIES:  current meds:   Current Facility-Administered Medications   Medication Dose Route Frequency    [START ON 3/8/2019] ceftriaxone (ROCEPHIN) 1 g/50 mL in dextrose IVPB  1,000 mg Intravenous Q24H    guaiFENesin (MUCINEX) 12 hr tablet 600 mg  600 mg Oral Q12H [address]    heparin (porcine) 25,000 units in 250 mL infusion (premix)  3-30 Units/kg/hr (Order-Specific) Intravenous Titrated    heparin (porcine) injection 4,200 Units  4,200 Units Intravenous PRN    heparin (porcine) injection 8,400 Units  8,400 Units Intravenous PRN    metroNIDAZOLE (FLAGYL) tablet 500 mg  500 mg Oral Q8H [address]       heparin (porcine) 3-30 Units/kg/hr (Order-Specific) Last Rate: 16 Units/kg/hr (19 0736)     No Known Allergies      REVIEW OF SYSTEMS:  Constitutional: Denies fever or chills  Eyes: Denies eye discharge or change in visual acuity   HENT: Denies sneezing, nasal congestion or sore throat   Respiratory: Denies cough, expectoration or shortness of breath   Cardiovascular: Denies chest pain, palpitations or lower extremity swelling   GI: Denies abdominal pain, nausea, vomiting, bloody stools or diarrhea   : Denies dysuria, frequency, difficulty in micturition or nocturia  Musculoskeletal: Denies back pain or joint pain   Neurologic: Denies lightheadedness, syncope, headache, focal weakness or sensory changes   Endocrine: Denies polyuria or polydipsia   Lymphatic: Denies swollen glands   Psychiatric: Denies depression, anxiety or panic       PHYSICAL EXAM:  Vitals:   Vitals:    19 0731   BP: 114/64   Pulse: 91   Resp: 20   Temp:    SpO2: 97%     Body mass index is 31 42 kg/m²  Systolic (97CXC), WIB:944 , Min:114 , XNY:691     Diastolic (69DIM), UC, Min:64, Max:80      Intake/Output Summary (Last 24 hours) at 3/7/2019 1107  Last data filed at 3/7/2019 5783  Gross per 24 hour   Intake    Output 1025 ml   Net -1025 ml     Weight (last 2 days)     Date/Time   Weight    19 2327   105 (231 7)            Invasive Devices     Peripheral Intravenous Line            Peripheral IV 19 Right Antecubital 1 day    Peripheral IV 19 Left Arm less than 1 day                General: Patient is not in acute distress  Laying comfortably in the bed  Awake, alert, responding to commands  Head: Normocephalic  Atraumatic  HEENT: Both pupils normal sized, round and reactive to light   Nonicteric  Neck: JVP not elevated  Trachea central  No thyromegaly  Respiratory: Bilateral bronchovascular breath sounds with no crackles or rhonchi  Cardiovascular: RRR  S1 and S2 normal  No murmur, rub or gallop  GI: Abdomen soft, nontender  No guarding or rigidity  Liver and spleen not palpable  Bowel sounds present  Neurologic: Patient is awake, alert, responding to commands  Well-oriented to time, place and person   Moving all extremities  Extremities: No clubbing, cyanosis or edema  Integument: No skin rashes or ulceration  Lymphatic: No cervical lymphadenopathy      LABORATORY RESULTS:  Results from last 7 days   Lab Units 03/07/19  0944 03/07/19  0634 03/07/19  0151 03/06/19 2141   CK TOTAL U/L  --   --   --  1,281*   TROPONIN I ng/mL 4 68* 4 72* 2 48* 0 12*   CK MB INDEX %  --   --   --  <1 0       CBC with diff: Results from last 7 days   Lab Units 03/07/19  0634 03/06/19 2148 03/06/19 2146 03/06/19 2141   WBC Thousand/uL 14 75*  --   --  20 20*   HEMOGLOBIN g/dL 12 8  --   --  13 8   I STAT HEMOGLOBIN g/dl  --  15 0 14 6  --    HEMATOCRIT % 38 9  --   --  40 8   HEMATOCRIT, ISTAT %  --  44 43  --    MCV fL 91  --   --  90   PLATELETS Thousands/uL 200  --   --  241   MCH pg 29 9  --   --  30 3   MCHC g/dL 32 9  --   --  33 8   RDW % 13 2  --   --  13 3   MPV fL 11 5  --   --  11 2   NRBC AUTO /100 WBCs 0  --   --  0       CMP:  Results from last 7 days   Lab Units 03/07/19  0634 03/06/19 2148 03/06/19 2146 03/06/19 2141   POTASSIUM mmol/L 3 7  --   --  3 5   CHLORIDE mmol/L 99*  --   --  98*   CO2 mmol/L 25  --   --  25   CO2, I-STAT mmol/L  --  27 26  --    BUN mg/dL 10  --   --  13   CREATININE mg/dL 0 91  --   --  1 29   GLUCOSE, ISTAT mg/dl  --  91 93  --    CALCIUM mg/dL 8 3  --   --  8 8   AST U/L  --   --   --  108*   ALT U/L  --   --   --  119*   ALK PHOS U/L  --   --   --  82   EGFR ml/min/1 73sq m 107  --  85 70       BMP:  Results from last 7 days   Lab Units 03/07/19  0634 03/06/19  2148 03/06/19  2146 03/06/19  2141   POTASSIUM mmol/L 3 7  --   --   --  3 5   CHLORIDE mmol/L 99*  --   --   --  98*   CO2 mmol/L 25  --   --   --  25   CO2, I-STAT mmol/L  --  27 26  --   --    BUN mg/dL 10  --   --   --  13   CREATININE mg/dL 0 91  --   --   --  1 29   GLUCOSE, ISTAT mg/dl  --  91 93   < >  --    CALCIUM mg/dL 8 3  --   --   --  8 8    < > = values in this interval not displayed  Results from last 7 days   Lab Units 03/07/19  0944   NT-PRO BNP pg/mL 1,425*      Results from last 7 days   Lab Units 03/06/19  2141   MAGNESIUM mg/dL 1 8         Results from last 7 days   Lab Units 03/06/19  2141   TSH 3RD GENERATON uIU/mL 8 323*     Results from last 7 days   Lab Units 03/06/19  2141   INR  1 15       Imaging: I have personally reviewed pertinent films in PACS  Procedure: Xr Chest 1 View Portable    Result Date: 3/7/2019  Narrative: CHEST INDICATION:   shortness of breath  COMPARISON:  Chest CT performed today  EXAM PERFORMED/VIEWS:  XR CHEST PORTABLE FINDINGS: Cardiomediastinal silhouette appears unremarkable  Multifocal airspace opacities are better appreciated on subsequent CT  No pneumothorax or pleural effusion  Osseous structures appear within normal limits for patient age  Impression: Multifocal airspace opacities are better appreciated on subsequent CT  Workstation performed: [id number]     Procedure: Ct Head Without Contrast    Result Date: 3/6/2019  Narrative: CT BRAIN - WITHOUT CONTRAST INDICATION:   Altered mental status  COMPARISON:  None  TECHNIQUE:  CT examination of the brain was performed  In addition to axial images, coronal 2D reformatted images were created and submitted for interpretation  Radiation dose length product (DLP) for this visit:  959 mGy-cm   This examination, like all CT scans performed in the [facility], was performed utilizing techniques to minimize radiation dose exposure, including the use of iterative reconstruction and automated exposure control  IMAGE QUALITY:  Diagnostic  FINDINGS: PARENCHYMA:  No intracranial mass, mass effect or midline shift  No CT signs of acute infarction  No acute parenchymal hemorrhage  VENTRICLES AND EXTRA-AXIAL SPACES:  Normal for the patient's age  VISUALIZED ORBITS AND PARANASAL SINUSES:  Unremarkable  CALVARIUM AND EXTRACRANIAL SOFT TISSUES:  Normal      Impression: No acute intracranial abnormality  Workstation performed: [id number]     Procedure: Cta Ed Chest Pe Study    Result Date: 3/6/2019  Narrative: CTA - CHEST WITH IV CONTRAST - PULMONARY ANGIOGRAM INDICATION:   sob, hypoxia, elevated trop  COMPARISON: None  TECHNIQUE: CTA examination of the chest was performed using angiographic technique according to a protocol specifically tailored to evaluate for pulmonary embolism  Axial, sagittal, and coronal 2D reformatted images were created from the source data and  submitted for interpretation  In addition, coronal 3D MIP postprocessing was performed on the acquisition scanner  Radiation dose length product (DLP) for this visit:  1199 98 mGy-cm   This examination, like all CT scans performed in the [facility], was performed utilizing techniques to minimize radiation dose exposure, including the use of iterative reconstruction and automated exposure control  IV Contrast:  85 mL of iohexol (OMNIPAQUE)  FINDINGS: Study is limited due to motion artifact  PULMONARY ARTERIAL TREE:  There is a subsegmental branch left lower lobe pulmonary emboli (series 5 image 85)  LUNGS:  The trachea and central bronchial tree are patent  Diffuse airspace opacities are seen within the right upper lobe and left lung  PLEURA:  Unremarkable  HEART/GREAT VESSELS:  Unremarkable for patient's age  MEDIASTINUM AND JOSIAH:  The sagittal lymph nodes measuring up to 1 1 cm are seen  CHEST WALL AND LOWER NECK:   Unremarkable  VISUALIZED STRUCTURES IN THE UPPER ABDOMEN:  Hepatic steatosis and hepatomegaly   OSSEOUS STRUCTURES:  Right lateral 7th rib fracture is seen  Impression: Subsegmental branch left lower lobe pulmonary emboli Diffuse airspace opacities within the right upper lobe and left lung  Findings may represent multifocal pneumonia versus pulmonary edema  Right lateral 7th rib nondisplaced fracture  I personally discussed this study with Miguel Mondragon on 3/6/2019 at 11:16 PM  Workstation performed: [id number]       EKG reviewed personally:  Sinus tachycardia with occasional PVCs, anterolateral infarct, age undetermined, anterior septal infarct, age undetermined    Telemetry reviewed personally:  Sinus tachycardia      Assessment and Plan:  1- NSTEMI type 1 versus type 2 to be determined  Denies chest pain or discomfort  Troponins 0 12/2 48/4 72/4 68; On IV heparin  proBNP elevated 1,425  Strong risk profile for CAD given strong family history of father with young MI (34), TOB use, illicit drug use  EKG showing sinus tach with occasional PVCs, anterolateral/anteroseptal infarct age undetermined  Echocardiogram ordered and pending to assess LVEF, regional wall motion abnormalities  Patient will need ischemic workup when hemodynamically stable    2- PE  Sub segmental branch left lower lobe pulmonary embolism  On IV heparin    3- Aspiration pneumonia on CT chest with leukocytosis  On antibiotics, O2  Management per [facility]    Code Status: Level 1 - Full Code  Total floor / unit time spent today 35 minutes  Greater than 50% of total time was spent with the patient and / or family counseling and / or coordination of care  A description of the counseling / coordination of care: Review of history, current assessment, development of a plan  Estela Jc PA-C  3/7/2019,11:07 AM       Portions of the record may have been created with voice recognition software  Occasional wrong words or sound alike substitutions may have occurred due to the inherent limitations of voice recognition software   Please read the chart carefully and recognize, using context, where substitutions may have occurred

## 2019-03-07 NOTE — H&P
H&P- Zoya Billingsley 1980, 46 y o  male MRN: [id number]    Unit/Bed#: ED 11 Encounter: [id number]    Primary Care Provider: No primary care provider on file  Date and time admitted to hospital: 3/6/2019  9:31 PM        * Aspiration pneumonia ([address] )  Assessment & Plan  IV Rocephin and Flagyl  Guaifenesin  O2 sat monitoring with supplemental O2 p r n  To maintain sat 90% or greater  Supportive care  CBC in the a m  To monitor leukocytosis      Pulmonary embolus (HCC)  Assessment & Plan  IV heparin for therapeutic anticoagulation  Await Cardiology evaluation of elevated cardiac enzyme prior to starting long-term anticoagulation  O2 sat monitoring with O2 p r n  To maintain sat 90% or greater  Telemetry monitoring    Closed traumatic nondisplaced fracture of one rib of right side  Assessment & Plan  P r n  Pain control  Supportive care    Troponin level elevated  Assessment & Plan  Cardiology consultation   Cycle cardiac enzymes  Patient currently without any anginal complaints continue to monitor clinically  possibility of PE on scan this may be related to that  Echocardiogram      VTE Prophylaxis: Heparin Drip  / reason for no mechanical VTE prophylaxis Patient presents with PE and is on chemoprophylaxis may have undiagnosed DVT that would be mobilized by SCD   Code Status:  Full code  POLST: There is no POLST form on file for this patient (pre-hospital)  Discussion with family:      Anticipated Length of Stay:  Patient will be admitted on an Inpatient basis with an anticipated length of stay of  greater than 2 midnights  Justification for Hospital Stay:  Aspiration pneumonia pulmonary embolus    Total Time for Visit, including Counseling / Coordination of Care: 20 minutes  Greater than 50% of this total time spent on direct patient counseling and coordination of care      Chief Complaint:   Altered mentation  History of Present Illness:    Zoya Billingsley is a 46 y o  male who presents with altered mentation  The patient is found to have methamphetamine positive urine, and this is likely the cause of the patient's altered mentation  He denies chest pain shortness of breath  He does report a cough productive of yellow sputum denies fevers chills at home  In the emergency department he is found to have a mildly positive troponin as well as evidence of aspiration pneumonia by CT of the chest   Patient will be admitted for management of both of these  Review of Systems:    Review of Systems   Constitutional: Negative  Negative for activity change, appetite change, chills, diaphoresis, fatigue, fever and unexpected weight change  HENT: Negative  Negative for congestion, facial swelling, hearing loss, rhinorrhea, sinus pressure, sinus pain, sore throat and tinnitus  Eyes: Negative  Negative for photophobia, pain, discharge and visual disturbance  Respiratory: Positive for cough  Negative for chest tightness, shortness of breath, wheezing and stridor  Cardiovascular: Negative  Negative for chest pain, palpitations and leg swelling  Gastrointestinal: Negative  Negative for abdominal distention, abdominal pain, blood in stool, constipation, diarrhea, nausea and vomiting  Endocrine: Negative  Negative for cold intolerance, heat intolerance and polyuria  Genitourinary: Negative  Negative for difficulty urinating, dysuria, flank pain, frequency, hematuria and urgency  Musculoskeletal: Negative  Negative for arthralgias, gait problem, joint swelling, myalgias and neck stiffness  Skin: Negative  Negative for color change, pallor, rash and wound  Allergic/Immunologic: Negative  Negative for immunocompromised state  Neurological: Negative  Negative for dizziness, seizures, syncope, weakness, light-headedness, numbness and headaches  Hematological: Negative  Negative for adenopathy  Does not bruise/bleed easily  Psychiatric/Behavioral: Positive for confusion   Negative for hallucinations  The patient is hyperactive  The patient is not nervous/anxious  Past Medical and Surgical History:     Past Medical History:   Diagnosis Date    Hyperlipidemia        History reviewed  No pertinent surgical history  Meds/Allergies:    Prior to Admission medications    Not on File     I have reviewed home medications using allscripts  Allergies: Allergies not on file    Social History:     Marital Status: Single   Occupation:  Unemployed Meteorologist  Patient Pre-hospital Living Situation:  Patient lives with girlfriend's parents  Patient Pre-hospital Level of Mobility:  Ambulatory without assist device  Patient Pre-hospital Diet Restrictions:  None  Substance Use History:   Social History     Substance and Sexual Activity   Alcohol Use Not on file     Social History     Tobacco Use   Smoking Status Not on file     Social History     Substance and Sexual Activity   Drug Use Not on file       Family History:    non-contributory    Physical Exam:     Vitals:   Blood Pressure: 125/68 (03/06/19 2355)  Pulse: (!) 121 (03/06/19 2355)  Temperature: 97 9 °F (36 6 °C) (03/06/19 2208)  Temp Source: Oral (03/06/19 2208)  Respirations: 20 (03/06/19 2355)  Weight - Scale: 105 kg (231 lb 11 3 oz) (03/06/19 2327)  SpO2: 95 % (03/06/19 2355)    Physical Exam   Constitutional: He is oriented to person, place, and time  He appears well-developed and well-nourished  No distress  HENT:   Head: Normocephalic and atraumatic  Right Ear: External ear normal    Left Ear: External ear normal    Nose: Nose normal    Eyes: Conjunctivae are normal  Right eye exhibits no discharge  Left eye exhibits no discharge  No scleral icterus  Neck: Normal range of motion  Neck supple  No JVD present  No tracheal deviation present  No thyromegaly present  Cardiovascular: Normal rate, regular rhythm, normal heart sounds and intact distal pulses  Exam reveals no gallop and no friction rub     No murmur heard   Pulmonary/Chest: Breath sounds normal  No stridor  No respiratory distress  He has no wheezes  He has no rales  Abdominal: Soft  Bowel sounds are normal  He exhibits no distension  There is no tenderness  There is no rebound and no guarding  Musculoskeletal: Normal range of motion  He exhibits no edema, tenderness or deformity  Neurological: He is alert and oriented to person, place, and time  He has normal reflexes  No cranial nerve deficit  He exhibits normal muscle tone  Coordination normal    Skin: Skin is warm and dry  No rash noted  He is not diaphoretic  No erythema  No pallor  Psychiatric: He has a normal mood and affect  His behavior is normal  Judgment and thought content normal    Nursing note and vitals reviewed  Additional Data:     Lab Results: I have personally reviewed pertinent reports  Results from last 7 days   Lab Units 03/06/19 2148 03/06/19 2141   WBC Thousand/uL  --   --  20 20*   HEMOGLOBIN g/dL  --   --  13 8   I STAT HEMOGLOBIN g/dl 15 0   < >  --    HEMATOCRIT %  --   --  40 8   HEMATOCRIT, ISTAT % 44   < >  --    PLATELETS Thousands/uL  --   --  241   NEUTROS PCT %  --   --  66   LYMPHS PCT %  --   --  23   MONOS PCT %  --   --  7   EOS PCT %  --   --  3    < > = values in this interval not displayed       Results from last 7 days   Lab Units 03/06/19 2148 03/06/19 2146 03/06/19 2141   SODIUM mmol/L  --   --   --  136   POTASSIUM mmol/L  --   --   --  3 5   CHLORIDE mmol/L  --   --   --  98*   CO2 mmol/L  --   --   --  25   CO2, I-STAT mmol/L 27 26   < >  --    BUN mg/dL  --   --   --  13   CREATININE mg/dL  --   --   --  1 29   AGAP mmol/L  --  19*  --   --    ANION GAP mmol/L  --   --   --  13   CALCIUM mg/dL  --   --   --  8 8   ALBUMIN g/dL  --   --   --  3 7   TOTAL BILIRUBIN mg/dL  --   --   --  1 20*   ALK PHOS U/L  --   --   --  82   ALT U/L  --   --   --  119*   AST U/L  --   --   --  108*   GLUCOSE RANDOM mg/dL  --   --   --  96    < > = values in this interval not displayed  Results from last 7 days   Lab Units 03/06/19  2141   INR  1 15                   Imaging: I have personally reviewed pertinent reports  CTA ED chest PE study   Final Result by David Godinez DO (03/06 7308)      Subsegmental branch left lower lobe pulmonary emboli      Diffuse airspace opacities within the right upper lobe and left lung  Findings may represent multifocal pneumonia versus pulmonary edema  Right lateral 7th rib nondisplaced fracture  I personally discussed this study with Nelson Murillo on 3/6/2019 at 11:16 PM                         Workstation performed: [id number]         CT head without contrast   Final Result by Khushi Miller MD (03/06 2229)      No acute intracranial abnormality  Workstation performed: [id number]         XR chest 1 view portable    (Results Pending)       EKG, Pathology, and Other Studies Reviewed on Admission:   · EKG:  Sinus tachycardia with a rate of 121    Allscripts / Epic Records Reviewed: Yes     ** Please Note: This note has been constructed using a voice recognition system   **

## 2019-03-07 NOTE — PROGRESS NOTES
POST ADMISSION CHECK     Agustín Montemayor 1980, 46 y o  male MRN: [id number]    Unit/Bed#: ED 11 Encounter: [id number]    Primary Care Provider: No primary care provider on file  Date and time admitted to hospital: 3/6/2019  9:31 PM       DOS: 3/7/2019    * Pneumonia  Assessment & Plan  · Pt presented with SOB, meeting sepsis criteria on admission with leukocytosis, tachycardia and respiratory source of infection  · CTA with evidence of diffuse airspace opacities in the RUL and left lung, likely multifocal pneumonia  · There were concerns on admission for aspiration pneumonia, however pt denies any episodes of vomiting or dysphagia  No evidence of being found in his vomitus  · SPEECH evaluation  · Continue IV ceftriaxone and flagyl for now pending culture results  · Obtain sputum cx, strep pneumo and legionella urine antigens  · Blood cultures pending  · Leukocytosis significantly improved today, tachycardia improving as well  · Saturating well on RA currently    Pulmonary embolus (HCC)  Assessment & Plan  · As evidenced by SOB, CTA with subsegmental branch LLL PE  · Appears to be unprovoked, pt denies any previous history, denies any malignancy, long travel, recent surgeries  Is at risk with smoking history  · Obtain thrombophilia panel  · Continue heparin gtt for now pending ECHO/cardiology recommendations, will likely need to be transitioned to a/c to continue for 3-6 months as this is patient's first episode  · Continue telemetry monitoring  · Saturating well on RA, supplemental O2 for comfort  · ECHO results pending to evaluate for any RV strain    Altered mental status  Assessment & Plan  · POA, UDS (+) meth and opiates  · Now resolved upon discussion with patient and patient's mother   · There were concerns on admission for possible seizure like activity?  CT head negative, would continue to monitor, non-focal neurologic exam, this was not mentioned to me by family at bedside today   · No need for neurology evaluation at this time  · Monitor closely    Tobacco abuse  Assessment & Plan  · Pt reports smoking 1/3 PPD  · Place on nicotine patch while here  · Encourage cessation    Elevated brain natriuretic peptide (BNP) level  Assessment & Plan  · BNP >1,400  · Pt appears euvolemic on exam   · CTA with evidence of multifocal pneumonia vs  Pulmonary edema  · Obtain ECHO  · Cardiology following  · Telemetry monitoring  · Hold off on any diuresis for now pending ECHO results  · Monitor     Closed traumatic nondisplaced fracture of one rib of right side  Assessment & Plan  · S/p fall in the beginning of February   · Continue PRN pain control and supportive care  · Stable    Troponin level elevated  Assessment & Plan  · NSTEMI type I vs  Type II TBD, could be secondary to acute PE vs  Underlying CAD  · Troponins 0 12/2 48/4 72/4 68  · Cardiology following,  · Obtain ECHO  · Continue heparin gtt for now  · Will likely need ischemic work up prior to discharge  · Denies any chest pain or shortness of breath currently    Subjective: Pt reports that he feels much better today  He reports prior to coming into the hospital he was feeling extremely short of breath  He reports this now resolved  Patient's mother at bedside also reported that patient's mentation is now back to baseline  He currently denies any lightheadedness, chest pain, abdominal pain prior nausea, vomiting, diarrhea, constipation or urinary difficulties  Denies any previous history of clots in the legs or in the lungs  He does report a history of smoking cigarettes  He denies any recent long travel, surgeries, trauma  Patient and patient's mother reports significant history of cardiac disease on paternal side  Both patient's father and uncle had MIs at very young ages      Objective:  Patient is in no acute distress lying in his hospital bed resting comfortably accompanied by his mother  HEENT:  Head is normocephalic and atraumatic, conjunctivae are within normal limits, no scleral icterus noted  Lungs:  Clear to auscultation bilaterally, no wheezes, rales or rhonchi  Cardio:  Regular rate and rhythm, no murmurs  Abdomen:  Normoactive bowel sounds in all 4 quadrants, no tenderness to palpation, no distention  Extremities:  Distal pulses noted lower extremities bilaterally, no edema present  Skin:  Warm and dry, no erythema  Psych: appears anxious at times

## 2019-03-07 NOTE — ASSESSMENT & PLAN NOTE
· Pt presented with SOB, meeting sepsis criteria on admission with leukocytosis, tachycardia and respiratory source of infection  · CTA with evidence of diffuse airspace opacities in the RUL and left lung, likely multifocal pneumonia  · There were concerns on admission for aspiration pneumonia, however pt denies any episodes of vomiting or dysphagia  No evidence of being found in his vomitus     · SPEECH evaluation  · Continue IV ceftriaxone and flagyl for now pending culture results  · Obtain sputum cx, strep pneumo and legionella urine antigens  · Blood cultures pending  · Leukocytosis significantly improved today, tachycardia improving as well  · Saturating well on RA currently

## 2019-03-07 NOTE — PLAN OF CARE
Problem: Potential for Falls  Goal: Patient will remain free of falls  Description  INTERVENTIONS:  - Assess patient frequently for physical needs  -  Identify cognitive and physical deficits and behaviors that affect risk of falls    -  [place] fall precautions as indicated by assessment   - Educate patient/family on patient safety including physical limitations  - Instruct patient to call for assistance with activity based on assessment  - Modify environment to reduce risk of injury  - Consider OT/PT consult to assist with strengthening/mobility  Outcome: Progressing     Problem: PAIN - ADULT  Goal: Verbalizes/displays adequate comfort level or baseline comfort level  Description  Interventions:  - Encourage patient to monitor pain and request assistance  - Assess pain using appropriate pain scale  - Administer analgesics based on type and severity of pain and evaluate response  - Implement non-pharmacological measures as appropriate and evaluate response  - Consider cultural and social influences on pain and pain management  - Notify physician/advanced practitioner if interventions unsuccessful or patient reports new pain  Outcome: Progressing     Problem: INFECTION - ADULT  Goal: Absence or prevention of progression during hospitalization  Description  INTERVENTIONS:  - Assess and monitor for signs and symptoms of infection  - Monitor lab/diagnostic results  - Monitor all insertion sites, i e  indwelling lines, tubes, and drains  - [place] appropriate cooling/warming therapies per order  - Administer medications as ordered  - Instruct and encourage patient and family to use good hand hygiene technique  - Identify and instruct in appropriate isolation precautions for identified infection/condition   Outcome: Progressing     Problem: DISCHARGE PLANNING  Goal: Discharge to home or other facility with appropriate resources  Description  INTERVENTIONS:  - Identify barriers to discharge w/patient and caregiver  - Arrange for needed discharge resources and transportation as appropriate  - Identify discharge learning needs (meds, wound care, etc )  - Refer to Case Management Department for coordinating discharge planning if the patient needs post-hospital services based on physician/advanced practitioner order or complex needs related to functional status, cognitive ability, or social support system   Outcome: Progressing     Problem: Knowledge Deficit  Goal: Patient/family/caregiver demonstrates understanding of disease process, treatment plan, medications, and discharge instructions  Description  Complete learning assessment and assess knowledge base  Interventions:  - Provide teaching at level of understanding  - Provide teaching via preferred learning methods  Outcome: Progressing     Problem: CARDIOVASCULAR - ADULT  Goal: Maintains optimal cardiac output and hemodynamic stability  Description  INTERVENTIONS:  - Monitor I/O, vital signs and rhythm  - Monitor for S/S and trends of decreased cardiac output i e  bleeding, hypotension  - Administer and titrate ordered vasoactive medications to optimize hemodynamic stability  - Assess quality of pulses, skin color and temperature  - Assess for signs of decreased coronary artery perfusion - ex   Angina  - Instruct patient to report change in severity of symptoms  Outcome: Progressing  Goal: Absence of cardiac dysrhythmias or at baseline rhythm  Description  INTERVENTIONS:  - Continuous cardiac monitoring, monitor vital signs, obtain 12 lead EKG if indicated  - Administer antiarrhythmic and heart rate control medications as ordered  - Monitor electrolytes and administer replacement therapy as ordered  Outcome: Progressing     Problem: RESPIRATORY - ADULT  Goal: Achieves optimal ventilation and oxygenation  Description  INTERVENTIONS:  - Assess for changes in respiratory status  - Assess for changes in mentation and behavior  - Position to facilitate oxygenation and minimize respiratory effort  - Oxygen administration by appropriate delivery method based on oxygen saturation (per order) or ABGs  - Initiate smoking cessation education as indicated  - Encourage broncho-pulmonary hygiene including cough, deep breathe, Incentive Spirometry  - Assess the need for suctioning and aspirate as needed  - Assess and instruct to report SOB or any respiratory difficulty  - Respiratory Therapy support as indicated  Outcome: Progressing

## 2019-03-07 NOTE — UTILIZATION REVIEW
Initial Clinical Review  Admission: Date/Time/Statement: 3/7/19 @ 0014   Orders Placed This Encounter   Procedures    Inpatient Admission     Standing Status:   Standing     Number of Occurrences:   1     Order Specific Question:   Admitting Physician     Answer:   Nancy Guerrero [[id number]]     Order Specific Question:   Level of Care     Answer:   Med Surg [[id number]]     Order Specific Question:   Estimated length of stay     Answer:   More than 2 Midnights     Order Specific Question:   Certification     Answer:   I certify that inpatient services are medically necessary for this patient for a duration of greater than two midnights  See H&P and MD Progress Notes for additional information about the patient's course of treatment  ED: Date/Time/Mode of Arrival:   ED Arrival Information     Expected Arrival Acuity Means of Arrival Escorted By Service Admission Type    - 3/6/2019 21:30 Emergent Ambulance [facility] Medicine Emergency    Arrival Complaint    -altered mental status        Chief Complaint:   Chief Complaint   Patient presents with    Altered Mental Status     Pt presents via EMS after taking "a couple of oxys" from a friend     Assessment/Plan: Maicol Bolanos is a 46 y o  male who presents with altered mentation  The patient is found to have methamphetamine positive urine, and this is likely the cause of the patient's altered mentation  He does report a cough productive of yellow sputum denies fevers chills at home  * Aspiration pneumonia (HCC)  Assessment & Plan  IV Rocephin and Flagyl  Guaifenesin  O2 sat monitoring with supplemental O2 p r n  To maintain sat 90% or greater  Supportive care  CBC in the a m  To monitor leukocytosis      Pulmonary embolus (HCC)  Assessment & Plan  IV heparin for therapeutic anticoagulation  Await Cardiology evaluation of elevated cardiac enzyme prior to starting long-term anticoagulation  O2 sat monitoring with O2 p r n   To maintain sat 90% or greater  Telemetry monitoring  VTE Prophylaxis: Heparin Drip  / reason for no mechanical VTE prophylaxis Patient presents with PE and is on chemoprophylaxis may have undiagnosed DVT that would be mobilized by SCD   Code Status:  Full code  Anticipated Length of Stay:  Patient will be admitted on an Inpatient basis with an anticipated length of stay of  greater than 2 midnights  Justification for Hospital Stay:  Aspiration pneumonia pulmonary embolus    ED Vital Signs:   ED Triage Vitals   Temperature Pulse Respirations Blood Pressure SpO2   19   97 9 °F (36 6 °C) (!) 141 22 141/80 92 %      Temp Source Heart Rate Source Patient Position - Orthostatic VS BP Location FiO2 (%)   19 --   Oral Monitor Lying Left arm       Pain Score       19 0045       No Pain        Wt Readings from Last 1 Encounters:   19 105 kg (231 lb 11 3 oz)   Pertinent Labs/Diagnostic Test Results:   WBC 20 20  Hgl 13 8 / Hct 4 8  Platelets 214  INR 7 28  SODIUM mmol/L 136   POTASSIUM mmol/L 3 5   CHLORIDE mmol/L 98*   CO2 mmol/L 25   CO2, I-STAT mmol/L  --    BUN mg/dL 13   CREATININE mg/dL 1 29   AGAP mmol/L  --    ANION GAP mmol/L 13   CALCIUM mg/dL 8 8   ALBUMIN g/dL 3 7   TOTAL BILIRUBIN mg/dL 1 20*   ALK PHOS U/L 82   ALT U/L 119*   AST U/L 108*   GLUCOSE RANDOM mg/dL 96   CT chest:    Subsegmental branch left lower lobe pulmonary emboli       Diffuse airspace opacities within the right upper lobe and left lung  Findings may represent multifocal pneumonia versus pulmonary edema        Right lateral 7th rib nondisplaced fracture          CT head:    No acute intracranial abnormality          EC, S  Tach  Total CK 1281  Trop 0 12, 2 48, 4 72,   NT-proBNT 1425  ph, Jose Luis ISTAT 7 300 7 300 - 7 400      pCO2, Jose Luis i-STAT 52 6High  42 0 - 50 0 mm HG     pO2, Jose Luis i-STAT 47  0High  35 0 - 45 0 mm HG     BE, i-STAT -1 -2 - 3 mmol/L     HCO3, Jose Luis i-STAT 25 9 24 0 - 30 0 mmol/L       Amph/Meth UR PositiveAbnormal  Negative     Barbiturate Ur Negative Negative     Benzodiazepine Urine Negative Negative     Cocaine Urine Negative Negative     Methadone Urine Negative Negative     Opiate Urine PositiveAbnormal  Negative     PCP Ur Negative Negative     THC Urine Negative Negative       Color, UA Yellow    Clarity, UA Clear    Specific Gravity, UA 1 025 1 003 - 1 030     pH, UA 6 0 4 5, 5 0, 5 5, 6 0, 6 5, 7 0, 7 5, 8 0     Leukocytes, UA Negative Negative     Nitrite, UA Negative Negative     Protein, UA 30 (1+)Abnormal  Negative mg/dl     Glucose, UA Negative Negative mg/dl     Ketones, UA Negative Negative mg/dl     Urobilinogen, UA 0 2 0 2, 1 0 E U /dl E U /dl     Bilirubin, UA Negative Negative     Blood, UA Trace-IntactAbnormal  Negative            ED Treatment:   Medication Administration from 03/06/2019 2130 to 03/07/2019 1416       Date/Time Order Dose Route Action Action by Comments     03/06/2019 2358 sodium chloride 0 9 % bolus 1,000 mL 0 mL Intravenous Stopped Nithya Sepulveda RN      03/06/2019 2143 sodium chloride 0 9 % bolus 1,000 mL 1,000 mL Intravenous [address] Nithya Sepulveda RN      03/06/2019 2150 ondansetron (ZOFRAN) injection 4 mg 4 mg Intravenous Given Nithya Sepulveda RN      03/06/2019 2256 iohexol (OMNIPAQUE) 350 MG/ML injection (MULTI-DOSE) 85 mL 85 mL Intravenous Given [facility]      03/07/2019 0518 ceftriaxone (ROCEPHIN) 1 g/50 mL in dextrose IVPB 0 mg Intravenous Stopped Alley Rebolledo RN      03/07/2019 0100 ceftriaxone (ROCEPHIN) 1 g/50 mL in dextrose IVPB 1,000 mg Intravenous [address] Alley Rebolledo [facility]      03/07/2019 0518 metroNIDAZOLE (FLAGYL) IVPB (premix) 500 mg 0 mg Intravenous Stopped Alley Rebolledo RN      03/06/2019 2358 metroNIDAZOLE (FLAGYL) IVPB (premix) 500 mg 500 mg Intravenous New Bag Nithya Sepulveda RN      03/07/2019 0028 heparin (porcine) injection 8,400 Units 8,400 Units Intravenous Given Madhavi Blanchard RN      03/07/2019 1316 heparin (porcine) 25,000 units in 250 mL infusion (premix) 14 Units/kg/hr Intravenous Rate/Dose Change Orlena Lanes, RN      03/07/2019 1235 heparin (porcine) 25,000 units in 250 mL infusion (premix) 16 Units/kg/hr Intravenous [address] Orlena Lanes, RN      03/07/2019 0736 heparin (porcine) 25,000 units in 250 mL infusion (premix) 16 Units/kg/hr Intravenous Rate/Dose Change Orlena Lanes, RN      03/07/2019 0053 heparin (porcine) 25,000 units in 250 mL infusion (premix) 18 Units/kg/hr Intravenous Handoff Hien Disla RN handoff with Es Baig     03/07/2019 0049 heparin (porcine) 25,000 units in 250 mL infusion (premix) 3 Units/kg/hr Intravenous Not Given Hien Disla RN duplicate order     82/09/0645 0028 heparin (porcine) 25,000 units in 250 mL infusion (premix) 18 Units/kg/hr Intravenous [address] Madhavi Blanchard RN      03/07/2019 0519 sodium chloride 0 9 % bolus 1,000 mL 0 mL Intravenous Stopped Hien Disla RN      03/07/2019 0209 sodium chloride 0 9 % bolus 1,000 mL 1,000 mL Intravenous [address] Hien Disla [facility]      03/07/2019 0545 metroNIDAZOLE (FLAGYL) tablet 500 mg 500 mg Oral Given Hien Disla RN      03/07/2019 1389 guaiFENesin (MUCINEX) 12 hr tablet 600 mg 600 mg Oral Given Hollie Zavaleta RN      03/07/2019 0101 guaiFENesin (MUCINEX) 12 hr tablet 600 mg 600 mg Oral Given Hien Disla RN         Past Medical/Surgical History:   Past Medical History:   Diagnosis Date    Hyperlipidemia      Admitting Diagnosis: Altered mental status [R41 82]  Age/Sex: 46 y o  male  Admission Orders:  Scheduled Meds:   Current Facility-Administered Medications:  [START ON 3/8/2019] cefTRIAXone 1,000 mg Intravenous Q24H Juan Pablo Girard MD    guaiFENesin 600 mg Oral Q12H [facility] Juan Pablo Girard MD    heparin (porcine) 3-30 Units/kg/hr (Order-Specific) Intravenous Titrated Chaka Munoz PA-C Last Rate: 14 Units/kg/hr (03/07/19 1316)   heparin (porcine) 4,200 Units Intravenous PRN [place] [facility], PA-C    heparin (porcine) 8,400 Units Intravenous PRN [place] [facility], PA-C    metroNIDAZOLE 500 mg Oral [facility] Katheryn Membreno MD      Continuous Infusions:   heparin (porcine) 3-30 Units/kg/hr (Order-Specific) Last Rate: 14 Units/kg/hr (03/07/19 1316)   regular diet  Up with assistance / ambulate q8h  ECHO; Pending  Consult speech  [facility] Utilization Review Department  Phone: [phone]; Fax [phone]  [email or web address]  ATTENTION: Please call with any questions or concerns to [phone]  and carefully listen to the prompts so that you are directed to the right person  Send all requests for admission clinical reviews, approved or denied determinations and any other requests to fax [phone]   All voicemails are confidential

## 2019-03-07 NOTE — ASSESSMENT & PLAN NOTE
· POA, UDS (+) meth and opiates  · Now resolved upon discussion with patient and patient's mother   · There were concerns on admission for possible seizure like activity?  CT head negative, would continue to monitor, non-focal neurologic exam, this was not mentioned to me by family at bedside today   · No need for neurology evaluation at this time  · Monitor closely

## 2019-03-08 LAB
ANION GAP SERPL CALCULATED.3IONS-SCNC: 9 MMOL/L (ref 4–13)
APTT PPP: 64 SECONDS (ref 26–38)
APTT PPP: 81 SECONDS (ref 26–38)
BUN SERPL-MCNC: 2 MG/DL (ref 5–25)
CALCIUM SERPL-MCNC: 8.6 MG/DL (ref 8.3–10.1)
CHLORIDE SERPL-SCNC: 102 MMOL/L (ref 100–108)
CO2 SERPL-SCNC: 26 MMOL/L (ref 21–32)
CREAT SERPL-MCNC: 0.66 MG/DL (ref 0.6–1.3)
DEPRECATED AT III PPP: 60 % OF NORMAL (ref 92–136)
ERYTHROCYTE [DISTWIDTH] IN BLOOD BY AUTOMATED COUNT: 13.3 % (ref 11.6–15.1)
GFR SERPL CREATININE-BSD FRML MDRD: 123 ML/MIN/1.73SQ M
GLUCOSE SERPL-MCNC: 103 MG/DL (ref 65–140)
HCT VFR BLD AUTO: 37.2 % (ref 36.5–49.3)
HGB BLD-MCNC: 12.3 G/DL (ref 12–17)
L PNEUMO1 AG UR QL IA.RAPID: NEGATIVE
MCH RBC QN AUTO: 29.7 PG (ref 26.8–34.3)
MCHC RBC AUTO-ENTMCNC: 33.1 G/DL (ref 31.4–37.4)
MCV RBC AUTO: 90 FL (ref 82–98)
PLATELET # BLD AUTO: 180 THOUSANDS/UL (ref 149–390)
PMV BLD AUTO: 11.7 FL (ref 8.9–12.7)
POTASSIUM SERPL-SCNC: 3.8 MMOL/L (ref 3.5–5.3)
RBC # BLD AUTO: 4.14 MILLION/UL (ref 3.88–5.62)
S PNEUM AG UR QL: NEGATIVE
SODIUM SERPL-SCNC: 137 MMOL/L (ref 136–145)
WBC # BLD AUTO: 6.12 THOUSAND/UL (ref 4.31–10.16)

## 2019-03-08 PROCEDURE — 85303 CLOT INHIBIT PROT C ACTIVITY: CPT | Performed by: PHYSICIAN ASSISTANT

## 2019-03-08 PROCEDURE — 85670 THROMBIN TIME PLASMA: CPT | Performed by: PHYSICIAN ASSISTANT

## 2019-03-08 PROCEDURE — 99232 SBSQ HOSP IP/OBS MODERATE 35: CPT | Performed by: INTERNAL MEDICINE

## 2019-03-08 PROCEDURE — 85306 CLOT INHIBIT PROT S FREE: CPT | Performed by: PHYSICIAN ASSISTANT

## 2019-03-08 PROCEDURE — 87040 BLOOD CULTURE FOR BACTERIA: CPT | Performed by: INTERNAL MEDICINE

## 2019-03-08 PROCEDURE — G8998 SWALLOW D/C STATUS: HCPCS

## 2019-03-08 PROCEDURE — 85730 THROMBOPLASTIN TIME PARTIAL: CPT | Performed by: PHYSICIAN ASSISTANT

## 2019-03-08 PROCEDURE — 85027 COMPLETE CBC AUTOMATED: CPT | Performed by: PHYSICIAN ASSISTANT

## 2019-03-08 PROCEDURE — 86146 BETA-2 GLYCOPROTEIN ANTIBODY: CPT | Performed by: PHYSICIAN ASSISTANT

## 2019-03-08 PROCEDURE — 85705 THROMBOPLASTIN INHIBITION: CPT | Performed by: PHYSICIAN ASSISTANT

## 2019-03-08 PROCEDURE — 92610 EVALUATE SWALLOWING FUNCTION: CPT

## 2019-03-08 PROCEDURE — 99220 PR INITIAL OBSERVATION CARE/DAY 70 MINUTES: CPT | Performed by: INTERNAL MEDICINE

## 2019-03-08 PROCEDURE — 81240 F2 GENE: CPT | Performed by: PHYSICIAN ASSISTANT

## 2019-03-08 PROCEDURE — 85732 THROMBOPLASTIN TIME PARTIAL: CPT | Performed by: PHYSICIAN ASSISTANT

## 2019-03-08 PROCEDURE — 99225 PR SBSQ OBSERVATION CARE/DAY 25 MINUTES: CPT | Performed by: NURSE PRACTITIONER

## 2019-03-08 PROCEDURE — 81241 F5 GENE: CPT | Performed by: PHYSICIAN ASSISTANT

## 2019-03-08 PROCEDURE — G8996 SWALLOW CURRENT STATUS: HCPCS

## 2019-03-08 PROCEDURE — 85598 HEXAGNAL PHOSPH PLTLT NEUTRL: CPT | Performed by: PHYSICIAN ASSISTANT

## 2019-03-08 PROCEDURE — G8997 SWALLOW GOAL STATUS: HCPCS

## 2019-03-08 PROCEDURE — 80048 BASIC METABOLIC PNL TOTAL CA: CPT | Performed by: PHYSICIAN ASSISTANT

## 2019-03-08 PROCEDURE — 85613 RUSSELL VIPER VENOM DILUTED: CPT | Performed by: PHYSICIAN ASSISTANT

## 2019-03-08 PROCEDURE — 85305 CLOT INHIBIT PROT S TOTAL: CPT | Performed by: PHYSICIAN ASSISTANT

## 2019-03-08 PROCEDURE — 85300 ANTITHROMBIN III ACTIVITY: CPT | Performed by: PHYSICIAN ASSISTANT

## 2019-03-08 PROCEDURE — 86147 CARDIOLIPIN ANTIBODY EA IG: CPT | Performed by: PHYSICIAN ASSISTANT

## 2019-03-08 RX ADMIN — ASPIRIN 81 MG 81 MG: 81 TABLET ORAL at 10:08

## 2019-03-08 RX ADMIN — METRONIDAZOLE 500 MG: 500 TABLET ORAL at 14:35

## 2019-03-08 RX ADMIN — METOPROLOL TARTRATE 12.5 MG: 25 TABLET ORAL at 14:35

## 2019-03-08 RX ADMIN — GUAIFENESIN 600 MG: 600 TABLET, EXTENDED RELEASE ORAL at 10:07

## 2019-03-08 RX ADMIN — HEPARIN SODIUM AND DEXTROSE 14 UNITS/KG/HR: 10000; 5 INJECTION INTRAVENOUS at 03:38

## 2019-03-08 RX ADMIN — NICOTINE 7 MG: 7 PATCH TRANSDERMAL at 10:07

## 2019-03-08 RX ADMIN — GUAIFENESIN 600 MG: 600 TABLET, EXTENDED RELEASE ORAL at 20:08

## 2019-03-08 RX ADMIN — VANCOMYCIN HYDROCHLORIDE 1250 MG: 5 INJECTION, POWDER, LYOPHILIZED, FOR SOLUTION INTRAVENOUS at 17:15

## 2019-03-08 RX ADMIN — ATORVASTATIN CALCIUM 10 MG: 10 TABLET, FILM COATED ORAL at 17:19

## 2019-03-08 RX ADMIN — METRONIDAZOLE 500 MG: 500 TABLET ORAL at 05:53

## 2019-03-08 RX ADMIN — CEFTRIAXONE SODIUM 1000 MG: 10 INJECTION, POWDER, FOR SOLUTION INTRAVENOUS at 00:40

## 2019-03-08 RX ADMIN — HEPARIN SODIUM AND DEXTROSE 14 UNITS/KG/HR: 10000; 5 INJECTION INTRAVENOUS at 20:25

## 2019-03-08 NOTE — ASSESSMENT & PLAN NOTE
· BNP >1,400  · Pt appears euvolemic on exam   · CTA with evidence of multifocal pneumonia vs  Pulmonary edema  · Echo shows an EF of 25-35%  · Cardiology following  · Telemetry monitoring  · Awaiting Cardiology recommendations regarding diuretics  · Monitor

## 2019-03-08 NOTE — ASSESSMENT & PLAN NOTE
· As evidenced by SOB, CTA with subsegmental branch LLL PE  · Appears to be unprovoked, pt denies any previous history, denies any malignancy, long travel, recent surgeries  Is at risk with smoking history  · Obtain thrombophilia panel  · Continue heparin gtt for now pending ECHO/cardiology recommendations, will likely need to be transitioned to a/c to continue for 3-6 months as this is patient's first episode  · Continue telemetry monitoring  · Saturating well on RA,   · ECHO showing 25 % to 30%  There was diffuse hypokinesis with svere hypkinesis of the septum and inferior wall  The septum is thinned out

## 2019-03-08 NOTE — SPEECH THERAPY NOTE
Speech Language/Pathology  Speech-Language Pathology Bedside Swallow Evaluation        Patient Name: Vidhi MCDOWELLW'S Date: 3/8/2019     Problem List  Patient Active Problem List   Diagnosis    Pneumonia    Troponin level elevated    Closed traumatic nondisplaced fracture of one rib of right side    Pulmonary embolus (HCC)    Elevated brain natriuretic peptide (BNP) level    Tobacco abuse    Altered mental status       Past Medical History  Past Medical History:   Diagnosis Date    Hyperlipidemia        Past Surgical History  History reviewed  No pertinent surgical history  Summary    Pt's oropharyngeal swallow appears within normal limits  No overt s/s of aspiration, dysphagia or pt concerns  Pt reports he has had bronchitis multiple times in the past (every 3 years) and walking pna x1  No recent episodes of choking/vomiting  Reports the episode leading to this admission was after walking up stairs  No association w/ eating  Recommendations:   Diet: regular diet and thin liquids   Meds: whole with liquid   Frequent Oral care: 2x/day  Aspiration precautions and compensatory swallowing strategies: upright posture and slow rate of feeding        Current Medical Status  Pt is a 46 y o  male who presented [facility] with AMS +methamphetamine use  CT of the chest was concerning for PNA ? aspiration relation  Pt reports no difficulty swallowing, coughing with foods/drinks, nor recent traumas/surgeries  Past medical history:   Please see H&P for details    Special Studies:  CT Head 3/6/19-No acute intracranial abnormality  CTA chest 3/6/19-Subsegmental branch left lower lobe pulmonary emboli     Diffuse airspace opacities within the right upper lobe and left lung  Findings may represent multifocal pneumonia versus pulmonary edema      Right lateral 7th rib nondisplaced fracture          Social/Education/Vocational Hx:  Pt lives with family    Swallow Information   Current Risks for Dysphagia & Aspiration: r/o aspiration relation to pna   Current Symptoms/Concerns: change in respiratory status  Current Diet: regular diet and thin liquids   Baseline Diet: regular diet and thin liquids    Baseline Assessment   Behavior/Cognition: alert  Speech/Language Status: able to participate in conversation and able to follow commands  Patient Positioning: upright in bed     Swallow Mechanism Exam   Facial: symmetrical  Labial: WFL  Lingual: WFL  Velum: symmetrical  Mandible: adequate ROM  Dentition: adequate  Vocal quality:clear/adequate   Volitional Cough: strong/productive   Respiratory: RA- did require O2 on arrival     Consistencies Assessed and Performance   Consistencies Administered: thin liquids, puree and hard solids    Oral Stage: Mastication of solids was complete  No oral residue  Oral control and posterior transfer of puree/liquids was functional  Pt was noted to eat at a fast rate but resulted in no difficulties  Pharyngeal Stage: Timely swallow initiation with adequate hyolaryngeal elevation felt via palpation  No overt s/s of aspiration or dysphagia with single nor successive sips and large bits  No coughing, voice remained clear        Esophageal Concerns: none reported      Results Reviewed with: patient   Dysphagia Goals: none at this time

## 2019-03-08 NOTE — ASSESSMENT & PLAN NOTE
· POA, UDS (+) meth and opiates, encourage cessation  · Now resolved upon discussion with patient and patient's mother   · There were concerns on admission for possible seizure like activity?  CT head negative, seizure precautions continued however no activity noted  · No need for neurology evaluation at this time  · Monitor closely

## 2019-03-08 NOTE — PROGRESS NOTES
[facility]  Progress Note - Unruly Sanchez 1980, 46 y o  male MRN: [id number]    Unit/Bed#: -01 Encounter: [id number]    Primary Care Provider: No primary care provider on file  Date and time admitted to hospital: 3/6/2019  9:31 PM        * Pneumonia  Assessment & Plan  · Pt presented with SOB, meeting sepsis criteria on admission with leukocytosis, tachycardia and respiratory source of infection  · CTA with evidence of diffuse airspace opacities in the RUL and left lung, likely multifocal pneumonia  · There were concerns on admission for aspiration pneumonia, however pt denies any episodes of vomiting or dysphagia  No evidence of being found in his vomitus  · SPEECH recommends continuing regular diet  · Continue IV ceftriaxone and flagyl for now pending culture results  · strep pneumo and legionella urine antigens are negative  · First bottle of blood cultures is negative for 24 hours, 2nd bottle is showing what looks like contamination  · Leukocytosis and tachycardia have resolved  · Saturating well on RA currently    Pulmonary embolus (HCC)  Assessment & Plan  · As evidenced by SOB, CTA with subsegmental branch LLL PE  · Appears to be unprovoked, pt denies any previous history, denies any malignancy, long travel, recent surgeries  Is at risk with smoking history  · Obtain thrombophilia panel  · Continue heparin gtt for now pending ECHO/cardiology recommendations, will likely need to be transitioned to a/c to continue for 3-6 months as this is patient's first episode  · Continue telemetry monitoring  · Saturating well on RA,   · ECHO showing 25 % to 30%  There was diffuse hypokinesis with svere hypkinesis of the septum and inferior wall  The septum is thinned out      Troponin level elevated  Assessment & Plan  · NSTEMI type I vs  Type II TBD, could be secondary to acute PE vs  Underlying CAD  · Troponins 0 12/2 48/4 72/4 68  · Cardiology following,  · Echo shows an EF of 25-30% with diffuse hypokinesis  · Continue heparin gtt for now  · Will likely need ischemic work up prior to discharge  · Denies any chest pain or shortness of breath currently    Altered mental status  Assessment & Plan  · POA, UDS (+) meth and opiates, encourage cessation  · Now resolved upon discussion with patient and patient's mother   · There were concerns on admission for possible seizure like activity? CT head negative, seizure precautions continued however no activity noted  · No need for neurology evaluation at this time  · Monitor closely    Tobacco abuse  Assessment & Plan  · Pt reports smoking 1/3 PPD  · Place on nicotine patch while here  · Encourage cessation    Elevated brain natriuretic peptide (BNP) level  Assessment & Plan  · BNP >1,400  · Pt appears euvolemic on exam   · CTA with evidence of multifocal pneumonia vs  Pulmonary edema  · Echo shows an EF of 25-35%  · Cardiology following  · Telemetry monitoring  · Awaiting Cardiology recommendations regarding diuretics  · Monitor     Closed traumatic nondisplaced fracture of one rib of right side  Assessment & Plan  · S/p fall in the beginning of February   · Continue PRN pain control and supportive care  · Stable       VTE Pharmacologic Prophylaxis:   Pharmacologic: Heparin Drip  Mechanical VTE Prophylaxis in Place: Yes    Patient Centered Rounds: I have performed bedside rounds with nursing staff today  Discussions with Specialists or Other Care Team Provider:  Reviewed previous provider's notes, reviewed cardiology notes discussed with case management and primary RN    Education and Discussions with Family / Patient:  Educated patient on current plan of care denies any additional questions or concerns at this time    Time Spent for Care: 30 minutes  More than 50% of total time spent on counseling and coordination of care as described above  Current Length of Stay: 1 day(s)    Current Patient Status: Inpatient   Certification Statement:  The patient will continue to require additional inpatient hospital stay due to Patient still requiring heparin drip for elevated troponins and pulmonary embolism evolving Cardiology plan for treatment patient is extremely poor EF    Discharge Plan / Estimated Discharge Date:  Next 48 hours pending improvement      Code Status: Level 1 - Full Code      Subjective:   Currently denies any chest pain chest tightness shortness of breath or difficulty breathing  Counseled patient extensively on the importance of lifestyle modifications and improvement  Counseled on the importance of drug and smoking cessation, verbalized understanding educated patient on importance of following up with Cardiology outpatient verbalized understand patient reports that he is tolerating his diet and able to ambulate without difficulty    Objective:     Vitals:   Temp (24hrs), Av 1 °F (36 7 °C), Min:97 88 °F (36 6 °C), Max:98 4 °F (36 9 °C)    Temp:  [97 88 °F (36 6 °C)-98 4 °F (36 9 °C)] 97 88 °F (36 6 °C)  HR:  [] 88  Resp:  [18-21] 19  BP: (110-134)/(63-80) 110/72  SpO2:  [92 %-100 %] 100 %  Body mass index is 30 38 kg/m²  Input and Output Summary (last 24 hours): Intake/Output Summary (Last 24 hours) at 3/8/2019 1251  Last data filed at 3/7/2019 1926  Gross per 24 hour   Intake    Output 650 ml   Net -650 ml     Physical Exam:     Physical Exam   Constitutional: He is oriented to person, place, and time  He appears well-developed and well-nourished  HENT:   Head: Normocephalic  Eyes: Pupils are equal, round, and reactive to light  No scleral icterus  Neck: Normal range of motion  Neck supple  Cardiovascular: Normal rate  Pulmonary/Chest: Effort normal and breath sounds normal  No respiratory distress  Abdominal: Soft  Bowel sounds are normal  He exhibits no distension  There is no tenderness  Musculoskeletal: Normal range of motion  He exhibits no edema or tenderness     Neurological: He is alert and oriented to person, place, and time  Skin: Skin is warm and dry  There is pallor  Psychiatric: He has a normal mood and affect  His behavior is normal  Judgment and thought content normal    Nursing note and vitals reviewed  Additional Data:     Labs:    Results from last 7 days   Lab Units 03/08/19  0634 03/07/19  0634   WBC Thousand/uL 6 12 14 75*   HEMOGLOBIN g/dL 12 3 12 8   HEMATOCRIT % 37 2 38 9   PLATELETS Thousands/uL 180 200   NEUTROS PCT %  --  66   LYMPHS PCT %  --  20   MONOS PCT %  --  10   EOS PCT %  --  4     Results from last 7 days   Lab Units 03/08/19  0634  03/06/19  2148  03/06/19  2141   POTASSIUM mmol/L 3 8   < >  --   --  3 5   CHLORIDE mmol/L 102   < >  --   --  98*   CO2 mmol/L 26   < >  --   --  25   CO2, I-STAT mmol/L  --   --  27   < >  --    BUN mg/dL 2*   < >  --   --  13   CREATININE mg/dL 0 66   < >  --   --  1 29   CALCIUM mg/dL 8 6   < >  --   --  8 8   ALK PHOS U/L  --   --   --   --  82   ALT U/L  --   --   --   --  119*   AST U/L  --   --   --   --  108*   GLUCOSE, ISTAT mg/dl  --   --  91   < >  --     < > = values in this interval not displayed  Results from last 7 days   Lab Units 03/06/19  2141   INR  1 15       * I Have Reviewed All Lab Data Listed Above  * Additional Pertinent Lab Tests Reviewed: [facility] Admission Reviewed    Recent Cultures (last 7 days):     Results from last 7 days   Lab Units 03/07/19  1809 03/06/19  2338   BLOOD CULTURE   --  Staphylococcus aureus*  No Growth at 24 hrs     GRAM STAIN RESULT   --  Gram positive cocci in clusters*   LEGIONELLA URINARY ANTIGEN  Negative  --        Last 24 Hours Medication List:     Current Facility-Administered Medications:  aspirin 81 mg Oral Daily Devyn Carbajal MD    atorvastatin 10 mg Oral Daily With Gisele Aguiar MD    cefTRIAXone 1,000 mg Intravenous Q24H Dotty Rich MD Last Rate: 1,000 mg (03/08/19 0040)   guaiFENesin 600 mg Oral Q12H Danika Guillaume MD heparin (porcine) 3-30 Units/kg/hr (Order-Specific) Intravenous Titrated Barillas Dotter, PA-C Last Rate: 14 Units/kg/hr (03/08/19 0338)   heparin (porcine) 4,200 Units Intravenous PRN Barillas Dotter, PA-C    heparin (porcine) 8,400 Units Intravenous PRN Barillas Dotter, PA-C    metroNIDAZOLE 500 mg Oral Q8H Gilberto Miller MD    nicotine 7 mg Transdermal Daily Schuyler Garza PA-C         Today, Patient Was Seen By: JOE Burns    ** Please Note: Dragon 360 Dictation voice to text software may have been used in the creation of this document   **

## 2019-03-08 NOTE — ASSESSMENT & PLAN NOTE
· Pt presented with SOB, meeting sepsis criteria on admission with leukocytosis, tachycardia and respiratory source of infection  · CTA with evidence of diffuse airspace opacities in the RUL and left lung, likely multifocal pneumonia  · There were concerns on admission for aspiration pneumonia, however pt denies any episodes of vomiting or dysphagia  No evidence of being found in his vomitus     · SPEECH recommends continuing regular diet  · Continue IV ceftriaxone and flagyl for now pending culture results  · strep pneumo and legionella urine antigens are negative  · First bottle of blood cultures is negative for 24 hours, 2nd bottle is showing what looks like contamination  · Leukocytosis and tachycardia have resolved  · Saturating well on RA currently

## 2019-03-08 NOTE — ASSESSMENT & PLAN NOTE
· NSTEMI type I vs  Type II TBD, could be secondary to acute PE vs  Underlying CAD  · Troponins 0 12/2 48/4 72/4 68  · Cardiology following,  · Echo shows an EF of 25-30% with diffuse hypokinesis  · Continue heparin gtt for now  · Will likely need ischemic work up prior to discharge  · Denies any chest pain or shortness of breath currently

## 2019-03-08 NOTE — PROGRESS NOTES
General Cardiology   Progress Note   Agnes Sellers 46 y o  male MRN: [id number]  Unit/Bed#: -01 Encounter: [id number]        Subjective:   Overnight events reviewed  Patient seen and examined  Denies any chest pain or shortness of breath, headache, blurred vision, syncope, pain or swelling in the extremities  Denies fevers or chills overnight  REVIEW OF SYSTEMS:  Constitutional:  Denies fever or chills   Eyes:  Denies change in visual acuity   HENT:  Denies nasal congestion or sore throat   Respiratory:  Denies cough or shortness of breath   Cardiovascular:  Denies chest pain or edema   GI:  Denies abdominal pain, nausea, vomiting, bloody stools or diarrhea   :  Denies dysuria, frequency, difficulty in micturition and nocturia  Musculoskeletal:  Denies back pain or joint pain   Neurologic:  Denies headache, focal weakness or sensory changes   Endocrine:  Denies polyuria or polydipsia   Lymphatic:  Denies swollen glands   Psychiatric:  Denies depression or anxiety     Objective:   Vitals:  Vitals:    03/07/19 2328   BP:    Pulse: 88   Resp:    Temp:    SpO2: 100%       Body mass index is 30 38 kg/m²  Systolic (65FVQ), FRB:322 , Min:110 , RRY:718     Diastolic (68TET), JCP:91, Min:63, Max:80      Intake/Output Summary (Last 24 hours) at 3/8/2019 1314  Last data filed at 3/7/2019 1926  Gross per 24 hour   Intake    Output 650 ml   Net -650 ml     Weight (last 2 days)     Date/Time   Weight    03/07/19 1700   102 (224)    03/06/19 2327   105 (231 7)            PHYSICAL EXAMS:  General:  Patient is not in acute distress, laying in the bed comfortably, awake, alert responding to commands  Head: Normocephalic, Atraumatic  HEENT: White sclera, pink conjunctiva,  PERRLA,pharynx benign  Neck:  Supple, no neck vein distention, carotids+2/+2 no bruits, thyromegaly, adenopathy  Respiratory: clear to P/A  Cardiovascular:  PMI normal, S1-S2 normal, No  Murmurs, thrills, gallops, rubs    Regular rhythm  GI: Abdomen soft nontender  No hepatosplenomegaly, adenopathy, ascites,or rebound tenderness  Extremities: No edema, normal pulses, no calf tenderness, no joint deformities, no venous disease   Integument:  No skin rashes or ulceration  Lymphatic:  No cervical or inguinal lymphadenopathy  Neurologic:  Patient is awake alert, responding to command, well-oriented to time and place and person moving all extremities      LABORATORY RESULTS:  Results from last 7 days   Lab Units 03/07/19  0944 03/07/19  0634 03/07/19  0151 03/06/19  2141   CK TOTAL U/L  --   --   --  1,281*   TROPONIN I ng/mL 4 68* 4 72* 2 48* 0 12*   CK MB INDEX %  --   --   --  <1 0     CBC with diff: Results from last 7 days   Lab Units 03/08/19  0634 03/07/19  0634 03/06/19 2148 03/06/19 2141   WBC Thousand/uL 6 12 14 75*  --   --  20 20*   HEMOGLOBIN g/dL 12 3 12 8  --   --  13 8   I STAT HEMOGLOBIN g/dl  --   --  15 0   < >  --    HEMATOCRIT % 37 2 38 9  --   --  40 8   HEMATOCRIT, ISTAT %  --   --  44   < >  --    MCV fL 90 91  --   --  90   PLATELETS Thousands/uL 180 200  --   --  241   MCH pg 29 7 29 9  --   --  30 3   MCHC g/dL 33 1 32 9  --   --  33 8   RDW % 13 3 13 2  --   --  13 3   MPV fL 11 7 11 5  --   --  11 2   NRBC AUTO /100 WBCs  --  0  --   --  0    < > = values in this interval not displayed         CMP:  Results from last 7 days   Lab Units 03/08/19  0634 03/07/19  0634 03/06/19 2148 03/06/19 2146 03/06/19 2141   POTASSIUM mmol/L 3 8 3 7  --   --   --  3 5   CHLORIDE mmol/L 102 99*  --   --   --  98*   CO2 mmol/L 26 25  --   --   --  25   CO2, I-STAT mmol/L  --   --  27 26   < >  --    BUN mg/dL 2* 10  --   --   --  13   CREATININE mg/dL 0 66 0 91  --   --   --  1 29   GLUCOSE, ISTAT mg/dl  --   --  91 93  --   --    CALCIUM mg/dL 8 6 8 3  --   --   --  8 8   AST U/L  --   --   --   --   --  108*   ALT U/L  --   --   --   --   --  119*   ALK PHOS U/L  --   --   --   --   --  82   EGFR ml/min/1 73sq m 123 107  --  85  --  70    < > = values in this interval not displayed  BMP:  Results from last 7 days   Lab Units 19  0634 19  0634 19  2148  19  2141   POTASSIUM mmol/L 3 8 3 7  --   --  3 5   CHLORIDE mmol/L 102 99*  --   --  98*   CO2 mmol/L 26 25  --   --  25   CO2, I-STAT mmol/L  --   --  27   < >  --    BUN mg/dL 2* 10  --   --  13   CREATININE mg/dL 0 66 0 91  --   --  1 29   GLUCOSE, ISTAT mg/dl  --   --  91   < >  --    CALCIUM mg/dL 8 6 8 3  --   --  8 8    < > = values in this interval not displayed  Results from last 7 days   Lab Units 19  0944   NT-PRO BNP pg/mL 1,425*      Results from last 7 days   Lab Units 19  2141   MAGNESIUM mg/dL 1 8         Results from last 7 days   Lab Units 19  2141   TSH 3RD GENERATON uIU/mL 8 323*     Results from last 7 days   Lab Units 19  2141   INR  1 15       Lipid Profile:   No results found for: CHOL  No results found for: HDL  No results found for: LDLCALC  No results found for: TRIG    Cardiac testing:   Results for orders placed during the hospital encounter of 19   Echo complete with contrast if indicated    Narrative [address] [place] [address]  [phone]    Transthoracic Echocardiogram  2D, M-mode, Doppler, and Color Doppler    Study date:  07-Mar-2019    Patient: Niall Fam  MR number: [id number]  Account number: [de-identified]  : 1980  Age: 46 years  Gender: Male  Status: Inpatient  Location: Emergency room  Height: 72 in  Weight: 231 lb  BP: 134/ 63 mmHg    Indications: Pulmonary embolism    Diagnoses: I26 99 - Other pulmonary embolism without acute cor pulmonale    Sonographer:  Be Franz  Referring Physician:  Edda Spain PA-C  Group:  [facility]  Interpreting Physician:  Tony Craven MD    SUMMARY    LEFT VENTRICLE:  The ventricle was mildly dilated    Ejection fraction was estimated to be 25 % to 30%   There was diffuse hypokinesis with svere hypkinesis of the septum and inferior wall  The septum is thinned out  RIGHT VENTRICLE:  The ventricle was mildly dilated  Systolic function was mildly reduced  Estimated peak pressure was at least 30 mmHg  LEFT ATRIUM:  The atrium was mildly to moderately dilated  RIGHT ATRIUM:  The atrium was dilated  TRICUSPID VALVE:  There was trace regurgitation  HISTORY: PRIOR HISTORY: Risk factors: elevated troponin, hypercholesterolemia and a history of current cigarette use (within the last month)  PROCEDURE: The procedure was performed in the emergency room  This was a routine study  The transthoracic approach was used  The study included complete 2D imaging, M-mode, complete spectral Doppler, and color Doppler  The heart rate was  87 bpm, at the start of the study  Images were obtained from the parasternal, apical, subcostal, and suprasternal notch acoustic windows  Echocardiographic views were limited due to lung interference  Image quality was adequate  LEFT VENTRICLE: The ventricle was mildly dilated  Ejection fraction was estimated to be 25 % to 30%  There was diffuse hypokinesis with svere hypkinesis of the septum and inferior wall  The septum is thinned out  RIGHT VENTRICLE: The ventricle was mildly dilated  Systolic function was mildly reduced  Wall thickness was normal  DOPPLER: Estimated peak pressure was at least 30 mmHg  LEFT ATRIUM: The atrium was mildly to moderately dilated  RIGHT ATRIUM: The atrium was dilated  MITRAL VALVE: Valve structure was normal  There was normal leaflet separation  DOPPLER: The transmitral velocity was within the normal range  There was no evidence for stenosis  There was no regurgitation  AORTIC VALVE: The valve was not well visualized  DOPPLER: There was no evidence for stenosis  TRICUSPID VALVE: The valve structure was normal  There was normal leaflet separation   DOPPLER: The transtricuspid velocity was within the normal range  There was no evidence for stenosis  There was trace regurgitation  PULMONIC VALVE: Leaflets exhibited normal thickness, no calcification, and normal cuspal separation  DOPPLER: The transpulmonic velocity was within the normal range  There was no regurgitation  PERICARDIUM: There was no pericardial effusion  The pericardium was normal in appearance  AORTA: The root exhibited normal size  SYSTEM MEASUREMENT TABLES    2D  %FS: 24 2 %  Ao Diam: 2 5 cm  EDV(Teich): 146 1 ml  EF(Teich): 47 6 %  ESV(Teich): 76 5 ml  IVSd: 1 cm  LA Area: 21 5 cm2  LA Diam: 4 4 cm  LVEDV MOD A4C: 202 1 ml  LVEF MOD A4C: 48 9 %  LVESV MOD A4C: 103 3 ml  LVIDd: 5 5 cm  LVIDs: 4 2 cm  LVLd A4C: 10 8 cm  LVLs A4C: 9 6 cm  LVPWd: 1 cm  RA Area: 22 cm2  RVIDd: 4 7 cm  SV MOD A4C: 98 8 ml  SV(Teich): 69 6 ml    CW  TR Vmax: 2 8 m/s  TR maxP 6 mmHg    MM  TAPSE: 2 1 cm    PW  E': 0 1 m/s  E/E': 8 9  MV A Reese: 0 7 m/s  MV Dec [place]: 2 9 m/s2  MV DecT: 271 7 ms  MV E Reese: 0 8 m/s  MV E/A Ratio: 1 1  MV PHT: 78 8 ms  MVA By PHT: 2 8 cm2    [facility] Accredited Echocardiography Laboratory    Prepared and electronically signed by    Fredi Cullen MD  Signed 07-Mar-2019 17:25:06       No results found for this or any previous visit  No results found for this or any previous visit  No procedure found  No results found for this or any previous visit      Meds/Allergies   current meds:   Current Facility-Administered Medications   Medication Dose Route Frequency    aspirin chewable tablet 81 mg  81 mg Oral Daily    atorvastatin (LIPITOR) tablet 10 mg  10 mg Oral Daily With Dinner    ceftriaxone (ROCEPHIN) 1 g/50 mL in dextrose IVPB  1,000 mg Intravenous Q24H    guaiFENesin (MUCINEX) 12 hr tablet 600 mg  600 mg Oral Q12H MAYELA    heparin (porcine) 25,000 units in 250 mL infusion (premix)  3-30 Units/kg/hr (Order-Specific) Intravenous Titrated    heparin (porcine) injection 4,200 Units  4,200 Units Intravenous PRN    heparin (porcine) injection 8,400 Units  8,400 Units Intravenous PRN    metoprolol tartrate (LOPRESSOR) partial tablet 12 5 mg  12 5 mg Oral Q12H MAYELA    metroNIDAZOLE (FLAGYL) tablet 500 mg  500 mg Oral Q8H [facility]    nicotine (NICODERM CQ) 7 mg/24hr TD 24 hr patch 7 mg  7 mg Transdermal Daily     No medications prior to admission  heparin (porcine) 3-30 Units/kg/hr (Order-Specific) Last Rate: 14 Units/kg/hr (03/08/19 7861)       Assessment/Plan:  1- NSTEMI type 1 versus type 2 to be determined  Denies chest pain or discomfort  Troponins peaked at 4 72; On IV heparin  Patient will need ischemic workup when hemodynamically stable  Continue aspirin, statin  Start Lopressor 12 5 mg b i d     2- 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%  Elevated pro BNP however patient is euvolemic  See above  Continue aspirin, statin, beta-blocker  Will add ACE-inhibitor when able given significant RWMA  Will receive LifeVest prior to discharge  3- PE  Sub segmental branch left lower lobe pulmonary embolism  On IV heparin; will likely need 6 months of AC     4- Aspiration pneumonia on CT chest with leukocytosis  On antibiotics  Leukocytosis has resolved  Management per [facility]    Counseling / Coordination of Care  Total floor / unit time spent today 20 minutes  Greater than 50% of total time was spent with the patient and / or family counseling and / or coordination of care  ** Please Note: Dragon 360 Dictation voice to text software may have been used in the creation of this document   **

## 2019-03-08 NOTE — CONSULTS
Consultation - Infectious Disease   Lady Tohmpson 46 y o  male MRN: [id number]  Unit/Bed#: -01 Encounter: [id number]      IMPRESSION & RECOMMENDATIONS:   1  Sepsis-POA  Leukocytosis and tachycardia  Appears to be secondary to Staphylococcus aureus bacteremia  No other clear source appreciated  The patient remains hemodynamically stable and nontoxic despite his initial systemic illness  His heart rate has come down and his white cell count has come down  He seems to be tolerating the antibiotics without difficulty   -discontinue ceftriaxone azithromycin  -vancomycin 1 5 g IV q 8 hours  -consult pharmacy for vancomycin trough management  -recheck blood cultures x2 sets  -monitor CBC with diff and creatinine  -supportive care    2  Staphylococcus aureus bacteremia-in the setting of patient with known injection drug use in the past   Although only 1 set is positive, will need to consider this a true bacteremia in this current clinical setting  Consideration for the possibility of MRSA  Consideration for the possibility of endocarditis  Initial transthoracic echocardiogram is negative for any valvular vegetation   -antibiotics as above  -follow up sensitivities; if MSSA can change to cefazolin 2 g IV q 8 hours  -follow up repeat blood cultures above  -may need COLEMAN but will decide until next week  -the patient will likely need a 4 week course of intravenous antibiotics  -no PICC line until the blood cultures are negative times 72 hours    3  Injection drug abuse-patient is denying he is currently using, however the current bacteremia is suspicious for ongoing drug use  -recommend host evaluation  -check HIV and hepatitis screen; I discussed the patient he did not refused  -the patient is not a candidate for home intravenous antibiotics     4  Abnormal CT of the chest-the ground-glass changes are not really consistent with an acute bacterial pneumonia  Possibly a transient aspiration pneumonitis  Possibly this is a toxin event to the lungs in the setting of active drug use  The patient's respiratory status has improved  -no need to continue antibiotics for aspiration pneumonia  -monitor respiratory status    5  Pulmonary embolism-anticoagulation as per the primary service    HISTORY OF PRESENT ILLNESS:  Reason for Consult:  Positive blood cultures  HPI: Roberto Robbins is a 46y o  year old male with a history of injection drug use admitted to [facility] with confusion and shortness of breath who I am asked to assist management  The patient apparently has used injection drugs in the past but denies having used any drugs since June of last year  The chain of events in this patient's current illness are not exactly clear  However he apparently took some illicit drug in began having confusion, shortness of breath and some reported seizure  He has developed a cough and some shortness of breath  Because of the acute mental status changes the patient was brought to the ER for further evaluation  In the emergency department the patient was acutely encephalopathic, was afebrile, but had a brisk leukocytosis  He was also quite tachycardic  He had blood cultures obtained, and was started on ceftriaxone and Flagyl for possible aspiration pneumonia  CT of the chest showed some vague ground-glass changes in the right lung that possibly supported pneumonia  He also was found have a pulmonary embolism and was placed on anticoagulation  The patient is now feeling better  His cognition has apparently improved  He has not had any fever spikes since his brief hospital stay  He denies any nausea vomiting or diarrhea, denies any dysuria or hematuria, denies any new rash or skin lesions, denies any new joint or muscle pains  REVIEW OF SYSTEMS:  A complete 12 point system-based review of systems is negative other than that noted in the HPI      PAST MEDICAL HISTORY:  Past Medical History:   Diagnosis Date    Hyperlipidemia      History reviewed  No pertinent surgical history  FAMILY HISTORY:  Non-contributory    SOCIAL HISTORY:  Social History   Social History     Substance and Sexual Activity   Alcohol Use Yes    Alcohol/week: 1 8 oz    Types: 3 Cans of beer per week    Frequency: 2-4 times a month    Drinks per session: 1 or 2    Binge frequency: Less than monthly     Social History     Substance and Sexual Activity   Drug Use Not on file     Social History     Tobacco Use   Smoking Status Current Every Day Smoker    Types: Cigarettes   Smokeless Tobacco Never Used       ALLERGIES:  No Known Allergies    MEDICATIONS:  All current active medications have been reviewed  Antibiotics:  Ceftriaxone and azithromycin 3    PHYSICAL EXAM:  Temp:  [97 88 °F (36 6 °C)-98 4 °F (36 9 °C)] 97 88 °F (36 6 °C)  HR:  [] 70  Resp:  [18-21] 19  BP: (110-134)/(63-80) 125/78  SpO2:  [92 %-100 %] 100 %  Temp (24hrs), Av 1 °F (36 7 °C), Min:97 88 °F (36 6 °C), Max:98 4 °F (36 9 °C)  Current: Temperature: 97 88 °F (36 6 °C)    Intake/Output Summary (Last 24 hours) at 3/8/2019 1515  Last data filed at 3/8/2019 1500  Gross per 24 hour   Intake 120 ml   Output 650 ml   Net -530 ml       General Appearance:  Appearing well, nontoxic, and in no distress   Head:  Normocephalic, without obvious abnormality, atraumatic   Eyes:  Conjunctiva pink and sclera anicteric, both eyes   Nose: Nares normal, mucosa normal, no drainage   Throat: Oropharynx moist without lesions   Neck: Supple, symmetrical, no adenopathy, no tenderness/mass/nodules   Back:   Symmetric, no curvature, ROM normal, no CVA tenderness   Lungs:   Clear to auscultation bilaterally, respirations unlabored   Chest Wall:  No tenderness or deformity   Heart:  RRR; no murmur, rub or gallop   Abdomen:   Soft, non-tender, non-distended, positive bowel sounds    Extremities: No cyanosis, clubbing or edema   Skin: No rashes or lesions   No draining wounds noted  Lymph nodes: Cervical, supraclavicular nodes normal   Neurologic: Alert and oriented times 3, speech seems a bit pressured, extremity strength 5/5 and symmetric       LABS, IMAGING, & OTHER STUDIES:  Lab Results:  I have personally reviewed pertinent labs  Results from last 7 days   Lab Units 03/08/19 0634 03/07/19  0634 03/06/19 2148 03/06/19  2141   WBC Thousand/uL 6 12 14 75*  --   --  20 20*   HEMOGLOBIN g/dL 12 3 12 8  --   --  13 8   I STAT HEMOGLOBIN g/dl  --   --  15 0   < >  --    PLATELETS Thousands/uL 180 200  --   --  241    < > = values in this interval not displayed  Results from last 7 days   Lab Units 03/08/19  0634 03/07/19 0634 03/06/19 2148 03/06/19 2141   SODIUM mmol/L 137 133*  --   --  136   POTASSIUM mmol/L 3 8 3 7  --   --  3 5   CHLORIDE mmol/L 102 99*  --   --  98*   CO2 mmol/L 26 25  --   --  25   CO2, I-STAT mmol/L  --   --  27   < >  --    BUN mg/dL 2* 10  --   --  13   CREATININE mg/dL 0 66 0 91  --   --  1 29   EGFR ml/min/1 73sq m 123 107  --    < > 70   GLUCOSE, ISTAT mg/dl  --   --  91   < >  --    CALCIUM mg/dL 8 6 8 3  --   --  8 8   AST U/L  --   --   --   --  108*   ALT U/L  --   --   --   --  119*   ALK PHOS U/L  --   --   --   --  82    < > = values in this interval not displayed  Results from last 7 days   Lab Units 03/07/19 1809 03/06/19  2338   BLOOD CULTURE   --  Staphylococcus aureus*  No Growth at 24 hrs  GRAM STAIN RESULT   --  Gram positive cocci in clusters*   LEGIONELLA URINARY ANTIGEN  Negative  --        Imaging Studies:   I have personally reviewed pertinent imaging study reports and images in PACS  CT chest-Subsegmental branch left lower lobe pulmonary emboli    Diffuse airspace opacities within the right upper lobe and left lung   Findings may represent multifocal pneumonia versus pulmonary edema  Right lateral 7th rib nondisplaced fracture

## 2019-03-09 PROBLEM — B95.61 BACTEREMIA DUE TO STAPHYLOCOCCUS AUREUS: Status: ACTIVE | Noted: 2019-03-09

## 2019-03-09 PROBLEM — R78.81 BACTEREMIA DUE TO STAPHYLOCOCCUS AUREUS: Status: ACTIVE | Noted: 2019-03-09

## 2019-03-09 LAB
ANION GAP SERPL CALCULATED.3IONS-SCNC: 8 MMOL/L (ref 4–13)
APTT PPP: 70 SECONDS (ref 26–38)
BACTERIA BLD CULT: ABNORMAL
BUN SERPL-MCNC: 6 MG/DL (ref 5–25)
CALCIUM SERPL-MCNC: 8.5 MG/DL (ref 8.3–10.1)
CHLORIDE SERPL-SCNC: 105 MMOL/L (ref 100–108)
CO2 SERPL-SCNC: 26 MMOL/L (ref 21–32)
CREAT SERPL-MCNC: 0.59 MG/DL (ref 0.6–1.3)
ERYTHROCYTE [DISTWIDTH] IN BLOOD BY AUTOMATED COUNT: 13.9 % (ref 11.6–15.1)
GFR SERPL CREATININE-BSD FRML MDRD: 128 ML/MIN/1.73SQ M
GLUCOSE SERPL-MCNC: 109 MG/DL (ref 65–140)
GRAM STN SPEC: ABNORMAL
HBV CORE AB SER QL: ABNORMAL
HBV CORE IGM SER QL: ABNORMAL
HBV SURFACE AG SER QL: ABNORMAL
HCT VFR BLD AUTO: 40 % (ref 36.5–49.3)
HCV AB SER QL: ABNORMAL
HGB BLD-MCNC: 13 G/DL (ref 12–17)
MCH RBC QN AUTO: 30.1 PG (ref 26.8–34.3)
MCHC RBC AUTO-ENTMCNC: 32.5 G/DL (ref 31.4–37.4)
MCV RBC AUTO: 93 FL (ref 82–98)
PLATELET # BLD AUTO: 185 THOUSANDS/UL (ref 149–390)
PMV BLD AUTO: 11.6 FL (ref 8.9–12.7)
POTASSIUM SERPL-SCNC: 4.3 MMOL/L (ref 3.5–5.3)
RBC # BLD AUTO: 4.32 MILLION/UL (ref 3.88–5.62)
SODIUM SERPL-SCNC: 139 MMOL/L (ref 136–145)
VANCOMYCIN TROUGH SERPL-MCNC: 12.7 UG/ML (ref 10–20)
WBC # BLD AUTO: 5.24 THOUSAND/UL (ref 4.31–10.16)

## 2019-03-09 PROCEDURE — 80202 ASSAY OF VANCOMYCIN: CPT | Performed by: NURSE PRACTITIONER

## 2019-03-09 PROCEDURE — 86803 HEPATITIS C AB TEST: CPT | Performed by: INTERNAL MEDICINE

## 2019-03-09 PROCEDURE — 80048 BASIC METABOLIC PNL TOTAL CA: CPT | Performed by: NURSE PRACTITIONER

## 2019-03-09 PROCEDURE — 87389 HIV-1 AG W/HIV-1&-2 AB AG IA: CPT | Performed by: INTERNAL MEDICINE

## 2019-03-09 PROCEDURE — 85027 COMPLETE CBC AUTOMATED: CPT | Performed by: NURSE PRACTITIONER

## 2019-03-09 PROCEDURE — 99232 SBSQ HOSP IP/OBS MODERATE 35: CPT | Performed by: INTERNAL MEDICINE

## 2019-03-09 PROCEDURE — 87340 HEPATITIS B SURFACE AG IA: CPT | Performed by: INTERNAL MEDICINE

## 2019-03-09 PROCEDURE — 86705 HEP B CORE ANTIBODY IGM: CPT | Performed by: INTERNAL MEDICINE

## 2019-03-09 PROCEDURE — 99225 PR SBSQ OBSERVATION CARE/DAY 25 MINUTES: CPT | Performed by: NURSE PRACTITIONER

## 2019-03-09 PROCEDURE — 85730 THROMBOPLASTIN TIME PARTIAL: CPT | Performed by: INTERNAL MEDICINE

## 2019-03-09 PROCEDURE — 86704 HEP B CORE ANTIBODY TOTAL: CPT | Performed by: INTERNAL MEDICINE

## 2019-03-09 RX ORDER — HEPARIN SODIUM 10000 [USP'U]/100ML
3-30 INJECTION, SOLUTION INTRAVENOUS
Status: ACTIVE | OUTPATIENT
Start: 2019-03-09 | End: 2019-03-09

## 2019-03-09 RX ORDER — LISINOPRIL 2.5 MG/1
2.5 TABLET ORAL DAILY
Status: DISCONTINUED | OUTPATIENT
Start: 2019-03-09 | End: 2019-03-12 | Stop reason: HOSPADM

## 2019-03-09 RX ORDER — METOPROLOL SUCCINATE 25 MG/1
25 TABLET, EXTENDED RELEASE ORAL DAILY
Status: DISCONTINUED | OUTPATIENT
Start: 2019-03-09 | End: 2019-03-12 | Stop reason: HOSPADM

## 2019-03-09 RX ADMIN — VANCOMYCIN HYDROCHLORIDE 1250 MG: 5 INJECTION, POWDER, LYOPHILIZED, FOR SOLUTION INTRAVENOUS at 23:55

## 2019-03-09 RX ADMIN — GUAIFENESIN 600 MG: 600 TABLET, EXTENDED RELEASE ORAL at 21:32

## 2019-03-09 RX ADMIN — RIVAROXABAN 15 MG: 15 TABLET, FILM COATED ORAL at 15:58

## 2019-03-09 RX ADMIN — VANCOMYCIN HYDROCHLORIDE 1250 MG: 5 INJECTION, POWDER, LYOPHILIZED, FOR SOLUTION INTRAVENOUS at 15:58

## 2019-03-09 RX ADMIN — NICOTINE 7 MG: 7 PATCH TRANSDERMAL at 07:53

## 2019-03-09 RX ADMIN — VANCOMYCIN HYDROCHLORIDE 1250 MG: 5 INJECTION, POWDER, LYOPHILIZED, FOR SOLUTION INTRAVENOUS at 00:33

## 2019-03-09 RX ADMIN — ATORVASTATIN CALCIUM 10 MG: 10 TABLET, FILM COATED ORAL at 15:58

## 2019-03-09 RX ADMIN — VANCOMYCIN HYDROCHLORIDE 1250 MG: 5 INJECTION, POWDER, LYOPHILIZED, FOR SOLUTION INTRAVENOUS at 07:51

## 2019-03-09 RX ADMIN — ASPIRIN 81 MG 81 MG: 81 TABLET ORAL at 07:52

## 2019-03-09 RX ADMIN — GUAIFENESIN 600 MG: 600 TABLET, EXTENDED RELEASE ORAL at 07:52

## 2019-03-09 NOTE — ASSESSMENT & PLAN NOTE
· NSTEMI type I vs  Type II TBD, could be secondary to acute PE vs  Underlying CAD  · Troponin peak of 4 72  · Cardiology following,  · Echo shows an EF of 25-30% with diffuse hypokinesis  · Transitioned from heparin drip to Eliquis  · Cardiac catheterization planned for Monday or Tuesday  · Denies any chest pain or shortness of breath currently

## 2019-03-09 NOTE — ASSESSMENT & PLAN NOTE
· POA, UDS (+) meth and opiates, encourage cessation , patient does have a history of injection drug abuse  · Change in mental status has Now resolved, patient is mentating appropriately and pleasant  · Monitor closely  · HIV and hepatitis screen are pending

## 2019-03-09 NOTE — ASSESSMENT & PLAN NOTE
· Pt presented with SOB, meeting sepsis criteria on admission with leukocytosis, tachycardia sepsis source is believed now to be related to bacteremia  · CTA with evidence of diffuse airspace opacities in the RUL and left lung, per Infectious Disease this images not consistent with acute bacterial pneumonia, possibly aspiration pneumatosis  · SPEECH recommends continuing regular diet  · Patient transition to vancomycin for bacteremia  · strep pneumo and legionella urine antigens are negative  · Leukocytosis and tachycardia have resolved  · Saturating well on RA currently, respiratory status has improved

## 2019-03-09 NOTE — ASSESSMENT & PLAN NOTE
· As evidenced by SOB, CTA with subsegmental branch LLL PE  · Appears to be unprovoked, pt denies any previous history, denies any malignancy, long travel, recent surgeries  Is at risk with smoking history  · Obtain thrombophilia panel  · transition patient from heparin drip to Xarelto today, case management checking into coverage for Xarelto  Xarelto favored over Eliquis since this will be daily dosing and will hopefully be helpful with compliance  · Cardiology following closely, will likely require cardiac catheterization on Monday or Tuesday   · Continue telemetry monitoring  · ECHO showing 25 % to 30%  There was diffuse hypokinesis with svere hypkinesis of the septum and inferior wall    Life vest recommended this time, case management aware

## 2019-03-09 NOTE — ASSESSMENT & PLAN NOTE
· As evident by blood cultures, repeat blood cultures are pending  · Infectious Disease following patient may require COLEMAN  · Currently no sequela of endocarditis however is still possible given patient's drug history  · Ceftriaxone and azithromycin discontinued,vancomycin 1 5 g IV q 8 hours, pharmacy following  · Will likely need for week course of antibiotics however is not a candidate for PICC line until blood cultures negative for 72 hours patient struck history will also have to be considered for discharging with a PICC line

## 2019-03-09 NOTE — PLAN OF CARE
Problem: Potential for Falls  Goal: Patient will remain free of falls  Description  INTERVENTIONS:  - Assess patient frequently for physical needs  -  Identify cognitive and physical deficits and behaviors that affect risk of falls    -  [place] fall precautions as indicated by assessment   - Educate patient/family on patient safety including physical limitations  - Instruct patient to call for assistance with activity based on assessment  - Modify environment to reduce risk of injury  - Consider OT/PT consult to assist with strengthening/mobility  Outcome: Progressing     Problem: PAIN - ADULT  Goal: Verbalizes/displays adequate comfort level or baseline comfort level  Description  Interventions:  - Encourage patient to monitor pain and request assistance  - Assess pain using appropriate pain scale  - Administer analgesics based on type and severity of pain and evaluate response  - Implement non-pharmacological measures as appropriate and evaluate response  - Consider cultural and social influences on pain and pain management  - Notify physician/advanced practitioner if interventions unsuccessful or patient reports new pain  Outcome: Progressing     Problem: INFECTION - ADULT  Goal: Absence or prevention of progression during hospitalization  Description  INTERVENTIONS:  - Assess and monitor for signs and symptoms of infection  - Monitor lab/diagnostic results  - Monitor all insertion sites, i e  indwelling lines, tubes, and drains  - [place] appropriate cooling/warming therapies per order  - Administer medications as ordered  - Instruct and encourage patient and family to use good hand hygiene technique  - Identify and instruct in appropriate isolation precautions for identified infection/condition   Outcome: Progressing     Problem: DISCHARGE PLANNING  Goal: Discharge to home or other facility with appropriate resources  Description  INTERVENTIONS:  - Identify barriers to discharge w/patient and caregiver  - Arrange for needed discharge resources and transportation as appropriate  - Identify discharge learning needs (meds, wound care, etc )  - Refer to Case Management Department for coordinating discharge planning if the patient needs post-hospital services based on physician/advanced practitioner order or complex needs related to functional status, cognitive ability, or social support system   Outcome: Progressing     Problem: Knowledge Deficit  Goal: Patient/family/caregiver demonstrates understanding of disease process, treatment plan, medications, and discharge instructions  Description  Complete learning assessment and assess knowledge base  Interventions:  - Provide teaching at level of understanding  - Provide teaching via preferred learning methods  Outcome: Progressing     Problem: CARDIOVASCULAR - ADULT  Goal: Maintains optimal cardiac output and hemodynamic stability  Description  INTERVENTIONS:  - Monitor I/O, vital signs and rhythm  - Monitor for S/S and trends of decreased cardiac output i e  bleeding, hypotension  - Administer and titrate ordered vasoactive medications to optimize hemodynamic stability  - Assess quality of pulses, skin color and temperature  - Assess for signs of decreased coronary artery perfusion - ex   Angina  - Instruct patient to report change in severity of symptoms  Outcome: Progressing  Goal: Absence of cardiac dysrhythmias or at baseline rhythm  Description  INTERVENTIONS:  - Continuous cardiac monitoring, monitor vital signs, obtain 12 lead EKG if indicated  - Administer antiarrhythmic and heart rate control medications as ordered  - Monitor electrolytes and administer replacement therapy as ordered  Outcome: Progressing     Problem: RESPIRATORY - ADULT  Goal: Achieves optimal ventilation and oxygenation  Description  INTERVENTIONS:  - Assess for changes in respiratory status  - Assess for changes in mentation and behavior  - Position to facilitate oxygenation and minimize respiratory effort  - Oxygen administration by appropriate delivery method based on oxygen saturation (per order) or ABGs  - Initiate smoking cessation education as indicated  - Encourage broncho-pulmonary hygiene including cough, deep breathe, Incentive Spirometry  - Assess the need for suctioning and aspirate as needed  - Assess and instruct to report SOB or any respiratory difficulty  - Respiratory Therapy support as indicated  Outcome: Progressing

## 2019-03-09 NOTE — PROGRESS NOTES
[facility]  Progress Note - Ariella Wells 1980, 46 y o  male MRN: [id number]    Unit/Bed#: -01 Encounter: [id number]    Primary Care Provider: No primary care provider on file  Date and time admitted to hospital: 3/6/2019  9:31 PM        * Pneumonia  Assessment & Plan  · Pt presented with SOB, meeting sepsis criteria on admission with leukocytosis, tachycardia sepsis source is believed now to be related to bacteremia  · CTA with evidence of diffuse airspace opacities in the RUL and left lung, per Infectious Disease this images not consistent with acute bacterial pneumonia, possibly aspiration pneumatosis  · SPEECH recommends continuing regular diet  · Patient transition to vancomycin for bacteremia  · strep pneumo and legionella urine antigens are negative  · Leukocytosis and tachycardia have resolved  · Saturating well on RA currently, respiratory status has improved    Pulmonary embolus (HCC)  Assessment & Plan  · As evidenced by SOB, CTA with subsegmental branch LLL PE  · Appears to be unprovoked, pt denies any previous history, denies any malignancy, long travel, recent surgeries  Is at risk with smoking history  · Obtain thrombophilia panel  · transition patient from heparin drip to Xarelto today, case management checking into coverage for Xarelto  Xarelto favored over Eliquis since this will be daily dosing and will hopefully be helpful with compliance  · Cardiology following closely, will likely require cardiac catheterization on Monday or Tuesday   · Continue telemetry monitoring  · ECHO showing 25 % to 30%  There was diffuse hypokinesis with svere hypkinesis of the septum and inferior wall    Life vest recommended this time, case management aware    Troponin level elevated  Assessment & Plan  · NSTEMI type I vs  Type II TBD, could be secondary to acute PE vs  Underlying CAD  · Troponin peak of 4 72  · Cardiology following,  · Echo shows an EF of 25-30% with diffuse hypokinesis  · Transitioned from heparin drip to Eliquis  · Cardiac catheterization planned for Monday or Tuesday  · Denies any chest pain or shortness of breath currently    Bacteremia due to Staphylococcus aureus  Assessment & Plan  · As evident by blood cultures, repeat blood cultures are pending  · Infectious Disease following patient may require COLEMAN  · Currently no sequela of endocarditis however is still possible given patient's drug history  · Ceftriaxone and azithromycin discontinued,vancomycin 1 5 g IV q 8 hours, pharmacy following  · Will likely need for week course of antibiotics however is not a candidate for PICC line until blood cultures negative for 72 hours patient struck history will also have to be considered for discharging with a PICC line    Altered mental status  Assessment & Plan  · POA, UDS (+) meth and opiates, encourage cessation , patient does have a history of injection drug abuse  · Change in mental status has Now resolved, patient is mentating appropriately and pleasant  · Monitor closely  · HIV and hepatitis screen are pending    Tobacco abuse  Assessment & Plan  · Pt reports smoking 1/3 PPD  · Place on nicotine patch while here  · Encourage cessation    Elevated brain natriuretic peptide (BNP) level  Assessment & Plan  · BNP >1,400  · Pt appears euvolemic on exam   · CTA with evidence of multifocal pneumonia vs  Pulmonary edema  · Echo shows an EF of 25-35%  · Cardiology following  · Telemetry monitoring  · Awaiting Cardiology recommendations regarding diuretics  · Monitor     Closed traumatic nondisplaced fracture of one rib of right side  Assessment & Plan  · S/p fall in the beginning of February   · Continue PRN pain control and supportive care  · Stable       VTE Pharmacologic Prophylaxis:   Pharmacologic: Heparin Drip  Mechanical VTE Prophylaxis in Place: Yes    Patient Centered Rounds: I have performed bedside rounds with nursing staff today      Discussions with Specialists or Other Care Team Provider:  Discussed plan of care with Cardiology reviewed infectious disease notes discussed with primary RN and case management    Education and Discussions with Family / Patient:  Educated patient on current plan of care denies any additional questions or concerns at this time    Time Spent for Care: 30 minutes  More than 50% of total time spent on counseling and coordination of care as described above  Current Length of Stay: 2 day(s)    Current Patient Status: Inpatient   Certification Statement: The patient will continue to require additional inpatient hospital stay due to Transitioned to Xarelto, pending sensitivities for antibiotics, pending repeat blood cultures, possible PICC line placement for 4 weeks of antibiotics  Possible cardiac catheterization on Monday or Tuesday    Discharge Plan / Estimated Discharge Date:  Potentially on Tuesday      Code Status: Level 1 - Full Code      Subjective:   Denies any chest pain chest tightness shortness of breath or difficulty breathing  Patient reports that he is feeling well, he is ambulating in the hallway without difficulty, and he is tolerating his diet patient offers no complaints at this time    Objective:     Vitals:   Temp (24hrs), Av 2 °F (36 8 °C), Min:98 1 °F (36 7 °C), Max:98 4 °F (36 9 °C)    Temp:  [98 1 °F (36 7 °C)-98 4 °F (36 9 °C)] 98 1 °F (36 7 °C)  HR:  [61-74] 61  Resp:  [16-18] 16  BP: (101-125)/(63-78) 107/68  SpO2:  [97 %-99 %] 98 %  Body mass index is 30 38 kg/m²  Input and Output Summary (last 24 hours): Intake/Output Summary (Last 24 hours) at 3/9/2019 1323  Last data filed at 3/9/2019 0300  Gross per 24 hour   Intake 520 ml   Output 601 ml   Net -81 ml       Physical Exam:     Physical Exam   Constitutional: He is oriented to person, place, and time  He appears well-developed and well-nourished  HENT:   Head: Normocephalic and atraumatic  Mouth/Throat: No oropharyngeal exudate     Eyes: Conjunctivae are normal  No scleral icterus  Neck: Normal range of motion  Neck supple  Cardiovascular: Normal rate  Pulmonary/Chest: Effort normal and breath sounds normal  No respiratory distress  Abdominal: Soft  Bowel sounds are normal  He exhibits no distension  Musculoskeletal: He exhibits no tenderness  Neurological: He is alert and oriented to person, place, and time  Skin: Skin is warm and dry  Psychiatric: He has a normal mood and affect  His behavior is normal  Judgment and thought content normal    Nursing note and vitals reviewed  Additional Data:     Labs:    Results from last 7 days   Lab Units 03/09/19  0505  03/07/19  0634   WBC Thousand/uL 5 24   < > 14 75*   HEMOGLOBIN g/dL 13 0   < > 12 8   HEMATOCRIT % 40 0   < > 38 9   PLATELETS Thousands/uL 185   < > 200   NEUTROS PCT %  --   --  66   LYMPHS PCT %  --   --  20   MONOS PCT %  --   --  10   EOS PCT %  --   --  4    < > = values in this interval not displayed  Results from last 7 days   Lab Units 03/09/19  0505  03/06/19  2148  03/06/19  2141   POTASSIUM mmol/L 4 3   < >  --   --  3 5   CHLORIDE mmol/L 105   < >  --   --  98*   CO2 mmol/L 26   < >  --   --  25   CO2, I-STAT mmol/L  --   --  27   < >  --    BUN mg/dL 6   < >  --   --  13   CREATININE mg/dL 0 59*   < >  --   --  1 29   CALCIUM mg/dL 8 5   < >  --   --  8 8   ALK PHOS U/L  --   --   --   --  82   ALT U/L  --   --   --   --  119*   AST U/L  --   --   --   --  108*   GLUCOSE, ISTAT mg/dl  --   --  91   < >  --     < > = values in this interval not displayed  Results from last 7 days   Lab Units 03/06/19  2141   INR  1 15       * I Have Reviewed All Lab Data Listed Above  * Additional Pertinent Lab Tests Reviewed: [facility] Admission Reviewed      Recent Cultures (last 7 days):     Results from last 7 days   Lab Units 03/07/19  1809 03/06/19  2338   BLOOD CULTURE   --  Staphylococcus aureus*  No Growth at 48 hrs     GRAM STAIN RESULT   --  Gram positive cocci in clusters*   LEGIONELLA URINARY ANTIGEN  Negative  --        Last 24 Hours Medication List:     Current Facility-Administered Medications:  aspirin 81 mg Oral Daily Raphael Correa MD    atorvastatin 10 mg Oral Daily With Cathy Olivas MD    guaiFENesin 600 mg Oral Q12H [address] Leeann Barnett MD    heparin (porcine) 3-30 Units/kg/hr (Order-Specific) Intravenous Titrated JOE Cantrell    heparin (porcine) 4,200 Units Intravenous PRN Noemi Covarrubias PA-C    heparin (porcine) 8,400 Units Intravenous PRN Noemi Covarrubias PA-C    lisinopril 2 5 mg Oral Daily Danis Oates DO    metoprolol succinate 25 mg Oral Daily Danis Oates DO    nicotine 7 mg Transdermal Daily Luca Brambila PA-C    rivaroxaban 15 mg Oral BID With Meals JOE Cantrell    vancomycin 15 mg/kg (Adjusted) Intravenous Q8H Alessio Perry MD Last Rate: 1,250 mg (03/09/19 0751)        Today, Patient Was Seen By: JOE Cantrell    ** Please Note: Dragon 360 Dictation voice to text software may have been used in the creation of this document   **

## 2019-03-10 LAB
ANION GAP SERPL CALCULATED.3IONS-SCNC: 8 MMOL/L (ref 4–13)
APTT PPP: 32 SECONDS (ref 26–38)
BUN SERPL-MCNC: 5 MG/DL (ref 5–25)
CALCIUM SERPL-MCNC: 8.8 MG/DL (ref 8.3–10.1)
CHLORIDE SERPL-SCNC: 104 MMOL/L (ref 100–108)
CO2 SERPL-SCNC: 26 MMOL/L (ref 21–32)
CREAT SERPL-MCNC: 0.65 MG/DL (ref 0.6–1.3)
ERYTHROCYTE [DISTWIDTH] IN BLOOD BY AUTOMATED COUNT: 13.8 % (ref 11.6–15.1)
GFR SERPL CREATININE-BSD FRML MDRD: 123 ML/MIN/1.73SQ M
GLUCOSE SERPL-MCNC: 117 MG/DL (ref 65–140)
HCT VFR BLD AUTO: 40.4 % (ref 36.5–49.3)
HGB BLD-MCNC: 13.1 G/DL (ref 12–17)
MCH RBC QN AUTO: 29.7 PG (ref 26.8–34.3)
MCHC RBC AUTO-ENTMCNC: 32.4 G/DL (ref 31.4–37.4)
MCV RBC AUTO: 92 FL (ref 82–98)
PLATELET # BLD AUTO: 200 THOUSANDS/UL (ref 149–390)
PMV BLD AUTO: 11.3 FL (ref 8.9–12.7)
POTASSIUM SERPL-SCNC: 3.7 MMOL/L (ref 3.5–5.3)
RBC # BLD AUTO: 4.41 MILLION/UL (ref 3.88–5.62)
SODIUM SERPL-SCNC: 138 MMOL/L (ref 136–145)
WBC # BLD AUTO: 5.63 THOUSAND/UL (ref 4.31–10.16)

## 2019-03-10 PROCEDURE — 80048 BASIC METABOLIC PNL TOTAL CA: CPT | Performed by: NURSE PRACTITIONER

## 2019-03-10 PROCEDURE — 85027 COMPLETE CBC AUTOMATED: CPT | Performed by: NURSE PRACTITIONER

## 2019-03-10 PROCEDURE — 85730 THROMBOPLASTIN TIME PARTIAL: CPT | Performed by: INTERNAL MEDICINE

## 2019-03-10 PROCEDURE — 99225 PR SBSQ OBSERVATION CARE/DAY 25 MINUTES: CPT | Performed by: NURSE PRACTITIONER

## 2019-03-10 PROCEDURE — 99232 SBSQ HOSP IP/OBS MODERATE 35: CPT | Performed by: INTERNAL MEDICINE

## 2019-03-10 RX ADMIN — RIVAROXABAN 15 MG: 15 TABLET, FILM COATED ORAL at 07:56

## 2019-03-10 RX ADMIN — METOPROLOL SUCCINATE 25 MG: 25 TABLET, EXTENDED RELEASE ORAL at 07:57

## 2019-03-10 RX ADMIN — RIVAROXABAN 15 MG: 15 TABLET, FILM COATED ORAL at 17:18

## 2019-03-10 RX ADMIN — LISINOPRIL 2.5 MG: 2.5 TABLET ORAL at 07:57

## 2019-03-10 RX ADMIN — GUAIFENESIN 600 MG: 600 TABLET, EXTENDED RELEASE ORAL at 22:08

## 2019-03-10 RX ADMIN — VANCOMYCIN HYDROCHLORIDE 1250 MG: 5 INJECTION, POWDER, LYOPHILIZED, FOR SOLUTION INTRAVENOUS at 08:01

## 2019-03-10 RX ADMIN — NICOTINE 7 MG: 7 PATCH TRANSDERMAL at 07:56

## 2019-03-10 RX ADMIN — ATORVASTATIN CALCIUM 10 MG: 10 TABLET, FILM COATED ORAL at 17:17

## 2019-03-10 RX ADMIN — ASPIRIN 81 MG 81 MG: 81 TABLET ORAL at 07:54

## 2019-03-10 RX ADMIN — VANCOMYCIN HYDROCHLORIDE 1250 MG: 5 INJECTION, POWDER, LYOPHILIZED, FOR SOLUTION INTRAVENOUS at 17:18

## 2019-03-10 RX ADMIN — GUAIFENESIN 600 MG: 600 TABLET, EXTENDED RELEASE ORAL at 07:56

## 2019-03-10 NOTE — PROGRESS NOTES
[facility]  Progress Note - Attila Reece 1980, 46 y o  male MRN: [id number]    Unit/Bed#: -01 Encounter: [id number]    Primary Care Provider: No primary care provider on file  Date and time admitted to hospital: 3/6/2019  9:31 PM  * Pneumonia  Assessment & Plan  · Pt presented with SOB, meeting sepsis criteria on admission with leukocytosis, tachycardia sepsis source is believed now to be related to bacteremia  · CTA with evidence of diffuse airspace opacities in the RUL and left lung, per Infectious Disease this images not consistent with acute bacterial pneumonia, possibly aspiration pneumatosis  · Patient transition to vancomycin for bacteremia  · strep pneumo and legionella urine antigens are negative  · Leukocytosis and tachycardia have resolved  · Saturating well on RA currently, respiratory status has improved    Pulmonary embolus (HCC)  Assessment & Plan  · As evidenced by SOB, CTA with subsegmental branch LLL PE  · Appears to be unprovoked, pt denies any previous history, denies any malignancy, long travel, recent surgeries  Is at risk with smoking history  · Obtain thrombophilia panel  · Continue Xarelto case management checking into coverage for Xarelto  Xarelto favored over Eliquis since this will be daily dosing and will hopefully be helpful with compliance  · Cardiology following closely, will likely require cardiac catheterization on Monday or Tuesday   · Continue telemetry monitoring  · ECHO showing 25 % to 30%  There was diffuse hypokinesis with svere hypkinesis of the septum and inferior wall    Life vest recommended this time, case management aware    Troponin level elevated  Assessment & Plan  · NSTEMI type I vs  Type II TBD, could be secondary to acute PE vs  Underlying CAD  · Troponin peak of 4 72  · Cardiology following,  · Echo shows an EF of 25-30% with diffuse hypokinesis  · Continue Eliquis  · Cardiac catheterization planned for Monday or Tuesday  · Denies any chest pain or shortness of breath currently    Bacteremia due to Staphylococcus aureus  Assessment & Plan  · As evident by blood cultures, repeat blood cultures are negative for 24 hours  · Infectious Disease following patient may require COLEMAN  · Currently no sequela of endocarditis however is still possible given patient's drug history  · Ceftriaxone and azithromycin discontinued,vancomycin 1 5 g IV q 8 hours, pharmacy following  · Will likely need 4 week course of antibiotics however is not a candidate for PICC line until blood cultures negative for 72 hours patient patient will not be a candidate for home IV antibiotics given drug history    Altered mental status  Assessment & Plan  · POA, UDS (+) meth and opiates, encourage cessation , patient does have a history of injection drug abuse  · Change in mental status has Now resolved, patient is mentating appropriately and pleasant  · Monitor closely  · HIV screen are pending  · Patient has hepatitis C antibody is highly reactive indicating days hep C, discussed this with patient and need for follow-up    Tobacco abuse  Assessment & Plan  · Pt reports smoking 1/3 PPD  · Place on nicotine patch while here  · Encourage cessation    Elevated brain natriuretic peptide (BNP) level  Assessment & Plan  · BNP >1,400  · Pt appears euvolemic on exam   · CTA with evidence of multifocal pneumonia vs  Pulmonary edema  · Echo shows an EF of 25-35%  · Cardiology following  · Telemetry monitoring  · Lisinopril added  · Monitor        VTE Pharmacologic Prophylaxis:   Pharmaclogic: Rivaroxaban (Xarelto)  Mechanical VTE Prophylaxis in Place: Yes    Patient Centered Rounds: I have performed bedside rounds with nursing staff today      Discussions with Specialists or Other Care Team Provider:  Reviewed cardiology notes discussed with Cardiology reviewed infectious disease notes discussed with primary RN and case management    Education and Discussions with Family / Patient:  Discussed plan of care with patient denies any additional questions or concerns at this time    Time Spent for Care: 15 minutes  More than 50% of total time spent on counseling and coordination of care as described above  Current Length of Stay: 3 day(s)    Current Patient Status: Inpatient   Certification Statement: The patient will continue to require additional inpatient hospital stay due to Still awaiting antibiotic sensitivities, patient will require to be 72 hour negative blood cultures for PICC line placement    Discharge Plan / Estimated Discharge Date:  Hopefully Tuesday      Code Status: Level 1 - Full Code      Subjective:   Patient offers no complaints is resting comfortably in the chair on the phone family denies any chest pain chest tightness shortness of breath difficulty breathing denies any needs at this time verbalizes understanding of plan    Objective:     Vitals:   Temp (24hrs), Av 3 °F (36 8 °C), Min:98 1 °F (36 7 °C), Max:98 8 °F (37 1 °C)    Temp:  [98 1 °F (36 7 °C)-98 8 °F (37 1 °C)] 98 8 °F (37 1 °C)  HR:  [58-72] 58  Resp:  [16-18] 18  BP: (107-117)/(67-78) 117/78  SpO2:  [98 %-99 %] 99 %  Body mass index is 30 38 kg/m²  Input and Output Summary (last 24 hours): Intake/Output Summary (Last 24 hours) at 3/10/2019 1148  Last data filed at 3/10/2019 0501  Gross per 24 hour   Intake 780 ml   Output 702 ml   Net 78 ml       Physical Exam:     Physical Exam   Constitutional: He is oriented to person, place, and time  He appears well-developed and well-nourished  He is active and cooperative  HENT:   Head: Normocephalic  Mouth/Throat: No oropharyngeal exudate  Eyes: Pupils are equal, round, and reactive to light  No scleral icterus  Neck: Normal range of motion  Neck supple  Cardiovascular: Normal rate and regular rhythm  Pulmonary/Chest: Effort normal and breath sounds normal    Abdominal: Soft   Bowel sounds are normal    Musculoskeletal: Normal range of motion  He exhibits no tenderness  Neurological: He is alert and oriented to person, place, and time  Skin: Skin is warm and dry  Psychiatric: He has a normal mood and affect  His behavior is normal  Judgment and thought content normal    Nursing note and vitals reviewed  Additional Data:     Labs:    Results from last 7 days   Lab Units 03/10/19  0534  03/07/19  0634   WBC Thousand/uL 5 63   < > 14 75*   HEMOGLOBIN g/dL 13 1   < > 12 8   HEMATOCRIT % 40 4   < > 38 9   PLATELETS Thousands/uL 200   < > 200   NEUTROS PCT %  --   --  66   LYMPHS PCT %  --   --  20   MONOS PCT %  --   --  10   EOS PCT %  --   --  4    < > = values in this interval not displayed  Results from last 7 days   Lab Units 03/10/19  0534  03/06/19  2148  03/06/19  2141   POTASSIUM mmol/L 3 7   < >  --   --  3 5   CHLORIDE mmol/L 104   < >  --   --  98*   CO2 mmol/L 26   < >  --   --  25   CO2, I-STAT mmol/L  --   --  27   < >  --    BUN mg/dL 5   < >  --   --  13   CREATININE mg/dL 0 65   < >  --   --  1 29   CALCIUM mg/dL 8 8   < >  --   --  8 8   ALK PHOS U/L  --   --   --   --  82   ALT U/L  --   --   --   --  119*   AST U/L  --   --   --   --  108*   GLUCOSE, ISTAT mg/dl  --   --  91   < >  --     < > = values in this interval not displayed  Results from last 7 days   Lab Units 03/06/19  2141   INR  1 15       * I Have Reviewed All Lab Data Listed Above  * Additional Pertinent Lab Tests Reviewed: [facility] Admission Reviewed    Recent Cultures (last 7 days):     Results from last 7 days   Lab Units 03/08/19  1649 03/08/19  1641 03/07/19  1809 03/06/19  2338   BLOOD CULTURE  No Growth at 24 hrs  No Growth at 24 hrs   --  No Growth at 72 hrs    Staphylococcus aureus*   GRAM STAIN RESULT   --   --   --  Gram positive cocci in clusters*   LEGIONELLA URINARY ANTIGEN   --   --  Negative  --        Last 24 Hours Medication List:     Current Facility-Administered Medications:  aspirin 81 mg Oral Daily Minnie Richmond Velázquez MD    atorvastatin 10 mg Oral Daily With Mazin Rust MD    guaiFENesin 600 mg Oral Q12H [address] Arline Brasher MD    lisinopril 2 5 mg Oral Daily Danitacurtis Bateman, DO    metoprolol succinate 25 mg Oral Daily Danita Bateman, DO    nicotine 7 mg Transdermal Daily Ronn Coleman PA-C    rivaroxaban 15 mg Oral BID With Meals JOE Timmons    vancomycin 15 mg/kg (Adjusted) Intravenous Q8H Jacobo Cleary MD Last Rate: 1,250 mg (03/10/19 0801)        Today, Patient Was Seen By: JOE Timmons    ** Please Note: Dragon 360 Dictation voice to text software may have been used in the creation of this document   **

## 2019-03-10 NOTE — ASSESSMENT & PLAN NOTE
· POA, UDS (+) meth and opiates, encourage cessation , patient does have a history of injection drug abuse  · Change in mental status has Now resolved, patient is mentating appropriately and pleasant  · Monitor closely  · HIV screen are pending  · Patient has hepatitis C antibody is highly reactive indicating days hep C, discussed this with patient and need for follow-up

## 2019-03-10 NOTE — PLAN OF CARE
Problem: Potential for Falls  Goal: Patient will remain free of falls  Description  INTERVENTIONS:  - Assess patient frequently for physical needs  -  Identify cognitive and physical deficits and behaviors that affect risk of falls    -  [place] fall precautions as indicated by assessment   - Educate patient/family on patient safety including physical limitations  - Instruct patient to call for assistance with activity based on assessment  - Modify environment to reduce risk of injury  - Consider OT/PT consult to assist with strengthening/mobility  Outcome: Progressing     Problem: PAIN - ADULT  Goal: Verbalizes/displays adequate comfort level or baseline comfort level  Description  Interventions:  - Encourage patient to monitor pain and request assistance  - Assess pain using appropriate pain scale  - Administer analgesics based on type and severity of pain and evaluate response  - Implement non-pharmacological measures as appropriate and evaluate response  - Consider cultural and social influences on pain and pain management  - Notify physician/advanced practitioner if interventions unsuccessful or patient reports new pain  Outcome: Progressing     Problem: INFECTION - ADULT  Goal: Absence or prevention of progression during hospitalization  Description  INTERVENTIONS:  - Assess and monitor for signs and symptoms of infection  - Monitor lab/diagnostic results  - Monitor all insertion sites, i e  indwelling lines, tubes, and drains  - [place] appropriate cooling/warming therapies per order  - Administer medications as ordered  - Instruct and encourage patient and family to use good hand hygiene technique  - Identify and instruct in appropriate isolation precautions for identified infection/condition   Outcome: Progressing     Problem: DISCHARGE PLANNING  Goal: Discharge to home or other facility with appropriate resources  Description  INTERVENTIONS:  - Identify barriers to discharge w/patient and caregiver  - Arrange for needed discharge resources and transportation as appropriate  - Identify discharge learning needs (meds, wound care, etc )  - Refer to Case Management Department for coordinating discharge planning if the patient needs post-hospital services based on physician/advanced practitioner order or complex needs related to functional status, cognitive ability, or social support system   Outcome: Progressing     Problem: Knowledge Deficit  Goal: Patient/family/caregiver demonstrates understanding of disease process, treatment plan, medications, and discharge instructions  Description  Complete learning assessment and assess knowledge base  Interventions:  - Provide teaching at level of understanding  - Provide teaching via preferred learning methods  Outcome: Progressing     Problem: CARDIOVASCULAR - ADULT  Goal: Maintains optimal cardiac output and hemodynamic stability  Description  INTERVENTIONS:  - Monitor I/O, vital signs and rhythm  - Monitor for S/S and trends of decreased cardiac output i e  bleeding, hypotension  - Administer and titrate ordered vasoactive medications to optimize hemodynamic stability  - Assess quality of pulses, skin color and temperature  - Assess for signs of decreased coronary artery perfusion - ex   Angina  - Instruct patient to report change in severity of symptoms  Outcome: Progressing  Goal: Absence of cardiac dysrhythmias or at baseline rhythm  Description  INTERVENTIONS:  - Continuous cardiac monitoring, monitor vital signs, obtain 12 lead EKG if indicated  - Administer antiarrhythmic and heart rate control medications as ordered  - Monitor electrolytes and administer replacement therapy as ordered  Outcome: Progressing     Problem: RESPIRATORY - ADULT  Goal: Achieves optimal ventilation and oxygenation  Description  INTERVENTIONS:  - Assess for changes in respiratory status  - Assess for changes in mentation and behavior  - Position to facilitate oxygenation and minimize respiratory effort  - Oxygen administration by appropriate delivery method based on oxygen saturation (per order) or ABGs  - Initiate smoking cessation education as indicated  - Encourage broncho-pulmonary hygiene including cough, deep breathe, Incentive Spirometry  - Assess the need for suctioning and aspirate as needed  - Assess and instruct to report SOB or any respiratory difficulty  - Respiratory Therapy support as indicated  Outcome: Progressing

## 2019-03-10 NOTE — ASSESSMENT & PLAN NOTE
· As evident by blood cultures, repeat blood cultures are negative for 24 hours  · Infectious Disease following patient may require COLEMAN  · Currently no sequela of endocarditis however is still possible given patient's drug history  · Ceftriaxone and azithromycin discontinued,vancomycin 1 5 g IV q 8 hours, pharmacy following  · Will likely need 4 week course of antibiotics however is not a candidate for PICC line until blood cultures negative for 72 hours patient patient will not be a candidate for home IV antibiotics given drug history

## 2019-03-10 NOTE — ASSESSMENT & PLAN NOTE
· Pt presented with SOB, meeting sepsis criteria on admission with leukocytosis, tachycardia sepsis source is believed now to be related to bacteremia  · CTA with evidence of diffuse airspace opacities in the RUL and left lung, per Infectious Disease this images not consistent with acute bacterial pneumonia, possibly aspiration pneumatosis  · Patient transition to vancomycin for bacteremia  · strep pneumo and legionella urine antigens are negative  · Leukocytosis and tachycardia have resolved  · Saturating well on RA currently, respiratory status has improved

## 2019-03-10 NOTE — ASSESSMENT & PLAN NOTE
· As evidenced by SOB, CTA with subsegmental branch LLL PE  · Appears to be unprovoked, pt denies any previous history, denies any malignancy, long travel, recent surgeries  Is at risk with smoking history  · Obtain thrombophilia panel  · Continue Xarelto case management checking into coverage for Xarelto  Xarelto favored over Eliquis since this will be daily dosing and will hopefully be helpful with compliance  · Cardiology following closely, will likely require cardiac catheterization on Monday or Tuesday   · Continue telemetry monitoring  · ECHO showing 25 % to 30%  There was diffuse hypokinesis with svere hypkinesis of the septum and inferior wall    Life vest recommended this time, case management aware

## 2019-03-10 NOTE — ASSESSMENT & PLAN NOTE
· BNP >1,400  · Pt appears euvolemic on exam   · CTA with evidence of multifocal pneumonia vs  Pulmonary edema  · Echo shows an EF of 25-35%  · Cardiology following  · Telemetry monitoring  · Lisinopril added  · Monitor

## 2019-03-10 NOTE — ASSESSMENT & PLAN NOTE
· NSTEMI type I vs  Type II TBD, could be secondary to acute PE vs  Underlying CAD  · Troponin peak of 4 72  · Cardiology following,  · Echo shows an EF of 25-30% with diffuse hypokinesis  · Continue Eliquis  · Cardiac catheterization planned for Monday or Tuesday  · Denies any chest pain or shortness of breath currently

## 2019-03-10 NOTE — PROGRESS NOTES
Cardiology Progress Note    Assessment/Plan   NSTEMI  Severe systolic cardiomyopathy with regional wall motion abnormalities  PNA  Methamphetamine/Opiate abuse  Staphylococcus aureus bacteremia  Pulmonary embolism  Tobacco abuse  Probable Hep C infection      Plan for coronary angiography tomorrow  NPO after MN  Lack of IE stigmata or significant valvulopathy suggest that bacteremia is not due to endocarditis  However, given pending cath with implies possibility of PCI, it would be beneficial to rule out IE or abscess  NPO at [facility] for COLEMAN tomorrow  Plan for coronary angiography tomorrow or the following day  Rivaroxaban started for PE  Continue Toprol XL, lisinopril for cardiomyopathy  Will follow  LOS: 3 days     Subjective   No acute events  No subjective complaints  Repeat blood cultures show NGTD  Hepatitis C AB positive  Objective     Vital signs in last 24 hours:  Temp:  [98 1 °F (36 7 °C)-98 8 °F (37 1 °C)] 98 8 °F (37 1 °C)  HR:  [58-72] 58  Resp:  [16-18] 18  BP: (107-117)/(67-78) 117/78    Physical Exam:  General: AAOx3, NAD  HEENT: Normocephalic/Atraumatic, No thyromegaly, lymphadenopathy, JVD or carotid bruits  Cardiovasc: Regular rhythm with a normal rate  No murmurs, rubs or gallops  Radial, carotid, femoral, dorsalis pedis and posterior tibal pulses are 2+/4  Chest/Pulm: CTAB, no wheezes, rales or rhonchi  Abdomen: +BSx4, Soft, non-tender  No tenderness, organomegaly, rebound, rigidity or guarding  Extremities: No edema          Scheduled Meds:  Current Facility-Administered Medications:  aspirin 81 mg Oral Daily Leeann Lerner MD    atorvastatin 10 mg Oral Daily With Alexa Carrillo MD    guaiFENesin 600 mg Oral Q12H [address] Cori Miles MD    lisinopril 2 5 mg Oral Daily Terry Capps, DO    metoprolol succinate 25 mg Oral Daily Terry Capps, DO    nicotine 7 mg Transdermal Daily Alison Ramos PA-C    rivaroxaban 15 mg Oral BID With Meals Korina Parks, CRNP    vancomycin 15 mg/kg (Adjusted) Intravenous Q8H Trini Terry MD Last Rate: 1,250 mg (03/10/19 0801)     Continuous Infusions:   PRN Meds:     Cardiographics  TTE 3/10/2019  SUMMARY  LEFT VENTRICLE:  The ventricle was mildly dilated  Ejection fraction was estimated to be 25 % to 30%  There was diffuse hypokinesis with svere hypkinesis of the septum and inferior wall  The septum is thinned out      RIGHT VENTRICLE:  The ventricle was mildly dilated  Systolic function was mildly reduced  Estimated peak pressure was at least 30 mmHg      LEFT ATRIUM:  The atrium was mildly to moderately dilated      RIGHT ATRIUM:  The atrium was dilated      TRICUSPID VALVE:  There was trace regurgitation            Lab Review   Results for Brady Jensen (MRN [id number]) as of 3/10/2019 09:26   3/10/2019 05:34   Sodium 138   Potassium 3 7   Chloride 104   CO2 26   Anion Gap 8   BUN 5   Creatinine 0 65   Glucose, Random 117   Calcium 8 8   eGFR 123     Results for Brady Jensen (MRN [id number]) as of 3/10/2019 09:26   3/10/2019 05:34   WBC 5 63   Red Blood Cell Count 4 41   Hemoglobin 13 1   HCT 40 4   MCV 92   MCH 29 7   MCHC 32 4   RDW 13 8   Platelet Count 505

## 2019-03-11 ENCOUNTER — ANESTHESIA (INPATIENT)
Dept: INTERVENTIONAL RADIOLOGY/VASCULAR | Facility: HOSPITAL | Age: 39
DRG: 720 | End: 2019-03-11
Payer: COMMERCIAL

## 2019-03-11 ENCOUNTER — APPOINTMENT (INPATIENT)
Dept: INTERVENTIONAL RADIOLOGY/VASCULAR | Facility: HOSPITAL | Age: 39
DRG: 720 | End: 2019-03-11
Attending: INTERNAL MEDICINE
Payer: COMMERCIAL

## 2019-03-11 PROBLEM — R41.82 ALTERED MENTAL STATUS: Status: RESOLVED | Noted: 2019-03-07 | Resolved: 2019-03-11

## 2019-03-11 PROBLEM — B19.20 HEPATITIS C: Status: ACTIVE | Noted: 2019-03-11

## 2019-03-11 LAB
ANION GAP SERPL CALCULATED.3IONS-SCNC: 10 MMOL/L (ref 4–13)
B2 GLYCOPROT1 IGA SER-ACNC: <9 GPI IGA UNITS (ref 0–25)
B2 GLYCOPROT1 IGG SER-ACNC: <9 GPI IGG UNITS (ref 0–20)
B2 GLYCOPROT1 IGM SER-ACNC: <9 GPI IGM UNITS (ref 0–32)
BUN SERPL-MCNC: 8 MG/DL (ref 5–25)
CALCIUM SERPL-MCNC: 8.6 MG/DL (ref 8.3–10.1)
CARDIOLIPIN IGA SER IA-ACNC: <9 APL U/ML (ref 0–11)
CARDIOLIPIN IGG SER IA-ACNC: <9 GPL U/ML (ref 0–14)
CARDIOLIPIN IGM SER IA-ACNC: 10 MPL U/ML (ref 0–12)
CHLORIDE SERPL-SCNC: 105 MMOL/L (ref 100–108)
CO2 SERPL-SCNC: 24 MMOL/L (ref 21–32)
CREAT SERPL-MCNC: 0.63 MG/DL (ref 0.6–1.3)
ERYTHROCYTE [DISTWIDTH] IN BLOOD BY AUTOMATED COUNT: 14 % (ref 11.6–15.1)
GFR SERPL CREATININE-BSD FRML MDRD: 125 ML/MIN/1.73SQ M
GLUCOSE SERPL-MCNC: 115 MG/DL (ref 65–140)
HCT VFR BLD AUTO: 42.7 % (ref 36.5–49.3)
HGB BLD-MCNC: 13.9 G/DL (ref 12–17)
INR PPP: 1.23 (ref 0.86–1.17)
MCH RBC QN AUTO: 30 PG (ref 26.8–34.3)
MCHC RBC AUTO-ENTMCNC: 32.6 G/DL (ref 31.4–37.4)
MCV RBC AUTO: 92 FL (ref 82–98)
PLATELET # BLD AUTO: 204 THOUSANDS/UL (ref 149–390)
PMV BLD AUTO: 11.5 FL (ref 8.9–12.7)
POTASSIUM SERPL-SCNC: 4 MMOL/L (ref 3.5–5.3)
PROT C AG ACT/NOR PPP IA: 66 % OF NORMAL (ref 60–150)
PROTHROMBIN TIME: 15.4 SECONDS (ref 11.8–14.2)
RBC # BLD AUTO: 4.63 MILLION/UL (ref 3.88–5.62)
SODIUM SERPL-SCNC: 139 MMOL/L (ref 136–145)
WBC # BLD AUTO: 6.66 THOUSAND/UL (ref 4.31–10.16)

## 2019-03-11 PROCEDURE — 93325 DOPPLER ECHO COLOR FLOW MAPG: CPT | Performed by: INTERNAL MEDICINE

## 2019-03-11 PROCEDURE — 85610 PROTHROMBIN TIME: CPT | Performed by: INTERNAL MEDICINE

## 2019-03-11 PROCEDURE — 93312 ECHO TRANSESOPHAGEAL: CPT

## 2019-03-11 PROCEDURE — 93321 DOPPLER ECHO F-UP/LMTD STD: CPT | Performed by: INTERNAL MEDICINE

## 2019-03-11 PROCEDURE — B24BZZ4 ULTRASONOGRAPHY OF HEART WITH AORTA, TRANSESOPHAGEAL: ICD-10-PCS | Performed by: INTERNAL MEDICINE

## 2019-03-11 PROCEDURE — 85027 COMPLETE CBC AUTOMATED: CPT | Performed by: NURSE PRACTITIONER

## 2019-03-11 PROCEDURE — 99233 SBSQ HOSP IP/OBS HIGH 50: CPT | Performed by: INTERNAL MEDICINE

## 2019-03-11 PROCEDURE — 99232 SBSQ HOSP IP/OBS MODERATE 35: CPT | Performed by: INTERNAL MEDICINE

## 2019-03-11 PROCEDURE — 99225 PR SBSQ OBSERVATION CARE/DAY 25 MINUTES: CPT | Performed by: PHYSICIAN ASSISTANT

## 2019-03-11 PROCEDURE — 93312 ECHO TRANSESOPHAGEAL: CPT | Performed by: INTERNAL MEDICINE

## 2019-03-11 PROCEDURE — 80048 BASIC METABOLIC PNL TOTAL CA: CPT | Performed by: NURSE PRACTITIONER

## 2019-03-11 RX ORDER — FENTANYL CITRATE 50 UG/ML
INJECTION, SOLUTION INTRAMUSCULAR; INTRAVENOUS AS NEEDED
Status: DISCONTINUED | OUTPATIENT
Start: 2019-03-11 | End: 2019-03-11 | Stop reason: SURG

## 2019-03-11 RX ORDER — CEFAZOLIN SODIUM 2 G/50ML
2000 SOLUTION INTRAVENOUS EVERY 8 HOURS
Status: DISCONTINUED | OUTPATIENT
Start: 2019-03-11 | End: 2019-03-12 | Stop reason: HOSPADM

## 2019-03-11 RX ORDER — PROPOFOL 10 MG/ML
INJECTION, EMULSION INTRAVENOUS AS NEEDED
Status: DISCONTINUED | OUTPATIENT
Start: 2019-03-11 | End: 2019-03-11 | Stop reason: SURG

## 2019-03-11 RX ORDER — LIDOCAINE HYDROCHLORIDE 10 MG/ML
INJECTION, SOLUTION INFILTRATION; PERINEURAL AS NEEDED
Status: DISCONTINUED | OUTPATIENT
Start: 2019-03-11 | End: 2019-03-11 | Stop reason: SURG

## 2019-03-11 RX ORDER — SODIUM CHLORIDE 9 MG/ML
INJECTION, SOLUTION INTRAVENOUS CONTINUOUS PRN
Status: DISCONTINUED | OUTPATIENT
Start: 2019-03-11 | End: 2019-03-11 | Stop reason: SURG

## 2019-03-11 RX ADMIN — RIVAROXABAN 15 MG: 15 TABLET, FILM COATED ORAL at 09:23

## 2019-03-11 RX ADMIN — PROPOFOL 130 MG: 10 INJECTION, EMULSION INTRAVENOUS at 11:12

## 2019-03-11 RX ADMIN — METOPROLOL SUCCINATE 25 MG: 25 TABLET, EXTENDED RELEASE ORAL at 09:23

## 2019-03-11 RX ADMIN — ATORVASTATIN CALCIUM 10 MG: 10 TABLET, FILM COATED ORAL at 17:39

## 2019-03-11 RX ADMIN — GUAIFENESIN 600 MG: 600 TABLET, EXTENDED RELEASE ORAL at 09:22

## 2019-03-11 RX ADMIN — RIVAROXABAN 15 MG: 15 TABLET, FILM COATED ORAL at 17:39

## 2019-03-11 RX ADMIN — FENTANYL CITRATE 50 MCG: 50 INJECTION, SOLUTION INTRAMUSCULAR; INTRAVENOUS at 11:12

## 2019-03-11 RX ADMIN — ASPIRIN 81 MG 81 MG: 81 TABLET ORAL at 09:21

## 2019-03-11 RX ADMIN — VANCOMYCIN HYDROCHLORIDE 1250 MG: 5 INJECTION, POWDER, LYOPHILIZED, FOR SOLUTION INTRAVENOUS at 00:07

## 2019-03-11 RX ADMIN — PROPOFOL 70 MG: 10 INJECTION, EMULSION INTRAVENOUS at 11:14

## 2019-03-11 RX ADMIN — LIDOCAINE HYDROCHLORIDE 50 MG: 10 INJECTION, SOLUTION INFILTRATION; PERINEURAL at 11:12

## 2019-03-11 RX ADMIN — SODIUM CHLORIDE: 0.9 INJECTION, SOLUTION INTRAVENOUS at 11:06

## 2019-03-11 RX ADMIN — GUAIFENESIN 600 MG: 600 TABLET, EXTENDED RELEASE ORAL at 22:26

## 2019-03-11 RX ADMIN — LISINOPRIL 2.5 MG: 2.5 TABLET ORAL at 09:23

## 2019-03-11 RX ADMIN — CEFAZOLIN SODIUM 2000 MG: 2 SOLUTION INTRAVENOUS at 17:39

## 2019-03-11 NOTE — SOCIAL WORK
CM name and role reviewed  Discharge Checklist reviewed and CM will continue to monitor for progress toward discharge goals in nursing and provider rounds  CM met with pt at bedside  Pt alert  Pt's mother present  Pt reported that he lives at home with family  He is ADL independent  No dme needed  He ambulates on his own  He stated that he manages his own meds  He said that he has prescription coverage and is on Xarelto  He uses [address] in [facility] [place]  He drives  CM reviewed discharge planning process including the following: identifying help at home, patient preference for discharge planning needs, pharmacy preference, and availability of treatment team to discuss questions or concerns patient and/or family may have regarding understanding medications and recognizing signs and symptoms once discharged  CM also encouraged patient to follow up with all recommended appointments after discharge  Patient advised of importance for patient and family to participate in managing patients medical well being

## 2019-03-11 NOTE — PLAN OF CARE
Problem: DISCHARGE PLANNING - CARE MANAGEMENT  Goal: Discharge to post-acute care or home with appropriate resources  Description  INTERVENTIONS:  - Conduct assessment to determine patient/family and health care team treatment goals, and need for post-acute services based on payer coverage, community resources, and patient preferences, and barriers to discharge  - Address psychosocial, clinical, and financial barriers to discharge as identified in assessment in conjunction with the patient/family and health care team  - Arrange appropriate level of post-acute services according to patient?s   needs and preference and payer coverage in collaboration with the physician and health care team  - Communicate with and update the patient/family, physician, and health care team regarding progress on the discharge plan  - Arrange appropriate transportation to post-acute venues  Outcome: Progressing  Note:   CM name and role reviewed  Discharge Checklist reviewed and CM will continue to monitor for progress toward discharge goals in nursing and provider rounds  CM met with pt at bedside  Pt alert  Pt's mother present  Pt reported that he lives at home with family  He is ADL independent  No dme needed  He ambulates on his own  He stated that he manages his own meds  He said that he has prescription coverage and is on Xarelto  He uses [employer] in [place]  [facility]  He drives  CM reviewed discharge planning process including the following: identifying help at home, patient preference for discharge planning needs, pharmacy preference, and availability of treatment team to discuss questions or concerns patient and/or family may have regarding understanding medications and recognizing signs and symptoms once discharged  CM also encouraged patient to follow up with all recommended appointments after discharge  Patient advised of importance for patient and family to participate in managing patient?s medical well being

## 2019-03-11 NOTE — ASSESSMENT & PLAN NOTE
· Hepatitis C ab positive  · History of IV drug use  He reports his girlfriend and father had Hep C   · Check HCV RNA Quant and genotype  · Hep B negative  HIV pending  · LFTs last checked 3/6: Ast 108 and  and TB 1 2   · Monitor LFTs - will recheck  Check abdominal ultrasound  · He will need outpatient follow up with GI   PLTs normal

## 2019-03-11 NOTE — ANESTHESIA PREPROCEDURE EVALUATION
Review of Systems/Medical History  Patient summary reviewed  Chart reviewed      Cardiovascular  Hyperlipidemia,    Pulmonary  Smoker cigarette smoker  , Pneumonia,        GI/Hepatic    Liver disease , Hepatitis C,             Endo/Other  Negative endo/other ROS      GYN  Negative gynecology ROS          Hematology  Negative hematology ROS      Musculoskeletal  Negative musculoskeletal ROS        Neurology  Negative neurology ROS      Psychology   Negative psychology ROS              Physical Exam    Airway    Mallampati score: II  TM Distance: >3 FB  Neck ROM: full     Dental   No notable dental hx     Cardiovascular  Rhythm: regular, Rate: normal, Cardiovascular exam normal    Pulmonary  Pulmonary exam normal Breath sounds clear to auscultation,     Other Findings        Anesthesia Plan  ASA Score- 2     Anesthesia Type- IV sedation with anesthesia with ASA Monitors  Additional Monitors:   Airway Plan:         Plan Factors-    Induction- intravenous  Postoperative Plan- Plan for postoperative opioid use  Informed Consent- Anesthetic plan and risks discussed with patient  I personally reviewed this patient with the CRNA  Discussed and agreed on the Anesthesia Plan with the CRNA  Tai Toledo

## 2019-03-11 NOTE — PROGRESS NOTES
General Cardiology   Progress Note   Violetta Muñoz 46 y o  male MRN: [id number]  Unit/Bed#: -01 Encounter: [id number]        Subjective:   Overnight events reviewed  Patient seen and examined  Patient to receive COLEMAN in order to r/o endocarditis, as well as cardiac catheterization today to assess for ischemic etiology for reduced EF  Denies any symptoms at this time such as chest pain or discomfort, shortness of breath fevers or chills, headache, blurred vision, pain or swelling extremities  REVIEW OF SYSTEMS:  Constitutional:  Denies fever or chills   Eyes:  Denies change in visual acuity   HENT:  Denies nasal congestion or sore throat   Respiratory:  Denies cough or shortness of breath   Cardiovascular:  Denies chest pain or edema   GI:  Denies abdominal pain, nausea, vomiting, bloody stools or diarrhea   :  Denies dysuria, frequency, difficulty in micturition and nocturia  Musculoskeletal:  Denies back pain or joint pain   Neurologic:  Denies headache, focal weakness or sensory changes   Endocrine:  Denies polyuria or polydipsia   Lymphatic:  Denies swollen glands   Psychiatric:  Denies depression or anxiety     Objective:   Vitals:  Vitals:    03/11/19 1203   BP: 104/62   Pulse: (!) 45   Resp: 16   Temp:    SpO2: 100%       Body mass index is 30 38 kg/m²  Systolic (11MGV), GMJ:886 , Min:104 , PJW:910     Diastolic (12RQP), CKA:17, Min:61, Max:81      Intake/Output Summary (Last 24 hours) at 3/11/2019 1239  Last data filed at 3/11/2019 1122  Gross per 24 hour   Intake 900 ml   Output 601 ml   Net 299 ml     Weight (last 2 days)     None        PHYSICAL EXAMS:  General:  Patient is not in acute distress, laying in the bed comfortably, awake, alert responding to commands  Head: Normocephalic, Atraumatic     HEENT: White sclera, pink conjunctiva,  PERRLA,pharynx benign  Neck:  Supple, no neck vein distention, carotids+2/+2 no bruits, thyromegaly, adenopathy  Respiratory: clear to P/A  Cardiovascular:  PMI normal, S1-S2 normal, No  Murmurs, thrills, gallops, rubs   Regular rhythm  GI:  Abdomen soft nontender  No hepatosplenomegaly, adenopathy, ascites,or rebound tenderness  Extremities: No edema, normal pulses, no calf tenderness, no joint deformities, no venous disease   Integument:  No skin rashes or ulceration  Lymphatic:  No cervical or inguinal lymphadenopathy  Neurologic:  Patient is awake alert, responding to command, well-oriented to time and place and person moving all extremities      LABORATORY RESULTS:  Results from last 7 days   Lab Units 03/07/19  0944 03/07/19  0634 03/07/19  0151 03/06/19  2141   CK TOTAL U/L  --   --   --  1,281*   TROPONIN I ng/mL 4 68* 4 72* 2 48* 0 12*   CK MB INDEX %  --   --   --  <1 0     CBC with diff:   Results from last 7 days   Lab Units 03/11/19  0512 03/10/19  0534 03/09/19  0505  03/07/19  0634  03/06/19  2141   WBC Thousand/uL 6 66 5 63 5 24   < > 14 75*  --  20 20*   HEMOGLOBIN g/dL 13 9 13 1 13 0   < > 12 8  --  13 8   I STAT HEMOGLOBIN   --   --   --   --   --    < >  --    HEMATOCRIT % 42 7 40 4 40 0   < > 38 9  --  40 8   HEMATOCRIT, ISTAT   --   --   --   --   --    < >  --    MCV fL 92 92 93   < > 91  --  90   PLATELETS Thousands/uL 204 200 185   < > 200  --  241   MCH pg 30 0 29 7 30 1   < > 29 9  --  30 3   MCHC g/dL 32 6 32 4 32 5   < > 32 9  --  33 8   RDW % 14 0 13 8 13 9   < > 13 2  --  13 3   MPV fL 11 5 11 3 11 6   < > 11 5  --  11 2   NRBC AUTO /100 WBCs  --   --   --   --  0  --  0    < > = values in this interval not displayed         CMP:  Results from last 7 days   Lab Units 03/11/19  0512 03/10/19  0534 03/09/19  0505  03/06/19  2148 03/06/19  2146  03/06/19  2141   POTASSIUM mmol/L 4 0 3 7 4 3   < >  --   --   --  3 5   CHLORIDE mmol/L 105 104 105   < >  --   --   --  98*   CO2 mmol/L 24 26 26   < >  --   --   --  25   CO2, I-STAT mmol/L  --   --   --   --  27 26   < >  --    BUN mg/dL 8 5 6   < >  --   --   --  13 CREATININE mg/dL 0 63 0 65 0 59*   < >  --   --   --  1 29   GLUCOSE, ISTAT mg/dl  --   --   --   --  91 93  --   --    CALCIUM mg/dL 8 6 8 8 8 5   < >  --   --   --  8 8   AST U/L  --   --   --   --   --   --   --  108*   ALT U/L  --   --   --   --   --   --   --  119*   ALK PHOS U/L  --   --   --   --   --   --   --  82   EGFR ml/min/1 73sq m 125 123 128   < >  --  85  --  70    < > = values in this interval not displayed  BMP:  Results from last 7 days   Lab Units 19  0512 03/10/19  0534 19  0505  19  2148   POTASSIUM mmol/L 4 0 3 7 4 3   < >  --    CHLORIDE mmol/L 105 104 105   < >  --    CO2 mmol/L 24 26 26   < >  --    CO2, I-STAT mmol/L  --   --   --   --  27   BUN mg/dL 8 5 6   < >  --    CREATININE mg/dL 0 63 0 65 0 59*   < >  --    GLUCOSE, ISTAT mg/dl  --   --   --   --  91   CALCIUM mg/dL 8 6 8 8 8 5   < >  --     < > = values in this interval not displayed           Results from last 7 days   Lab Units 19  0944   NT-PRO BNP pg/mL 1,425*      Results from last 7 days   Lab Units 19  2141   MAGNESIUM mg/dL 1 8         Results from last 7 days   Lab Units 19  2141   TSH 3RD GENERATON uIU/mL 8 323*     Results from last 7 days   Lab Units 19  0513 19  2141   INR  1 23* 1 15       Lipid Profile:   No results found for: CHOL  No results found for: HDL  No results found for: LDLCALC  No results found for: TRIG    Cardiac testing:   Results for orders placed during the hospital encounter of 19   Echo complete with contrast if indicated    Narrative [address] [place] [address] [id number] [phone]    Transthoracic Echocardiogram  2D, M-mode, Doppler, and Color Doppler    Study date:  07-Mar-2019    Patient: Patrice Freedman  MR number: [id number]  Account number: [de-identified]  : 1980  Age: 46 years  Gender: Male  Status: Inpatient  Location: Emergency room  Height: 72 in  Weight: 231 lb  BP: 134/ 63 mmHg    Indications: Pulmonary embolism    Diagnoses: I26 99 - Other pulmonary embolism without acute cor pulmonale    Sonographer:  Sen Gilbert  Referring Physician:  Cristofer Burrell PA-C  Group:  [facility]  Interpreting Physician:  Aman Lenz MD    SUMMARY    LEFT VENTRICLE:  The ventricle was mildly dilated  Ejection fraction was estimated to be 25 % to 30%  There was diffuse hypokinesis with svere hypkinesis of the septum and inferior wall  The septum is thinned out  RIGHT VENTRICLE:  The ventricle was mildly dilated  Systolic function was mildly reduced  Estimated peak pressure was at least 30 mmHg  LEFT ATRIUM:  The atrium was mildly to moderately dilated  RIGHT ATRIUM:  The atrium was dilated  TRICUSPID VALVE:  There was trace regurgitation  HISTORY: PRIOR HISTORY: Risk factors: elevated troponin, hypercholesterolemia and a history of current cigarette use (within the last month)  PROCEDURE: The procedure was performed in the emergency room  This was a routine study  The transthoracic approach was used  The study included complete 2D imaging, M-mode, complete spectral Doppler, and color Doppler  The heart rate was  87 bpm, at the start of the study  Images were obtained from the parasternal, apical, subcostal, and suprasternal notch acoustic windows  Echocardiographic views were limited due to lung interference  Image quality was adequate  LEFT VENTRICLE: The ventricle was mildly dilated  Ejection fraction was estimated to be 25 % to 30%  There was diffuse hypokinesis with svere hypkinesis of the septum and inferior wall  The septum is thinned out  RIGHT VENTRICLE: The ventricle was mildly dilated  Systolic function was mildly reduced  Wall thickness was normal  DOPPLER: Estimated peak pressure was at least 30 mmHg  LEFT ATRIUM: The atrium was mildly to moderately dilated  RIGHT ATRIUM: The atrium was dilated      MITRAL VALVE: Valve structure was normal  There was normal leaflet separation  DOPPLER: The transmitral velocity was within the normal range  There was no evidence for stenosis  There was no regurgitation  AORTIC VALVE: The valve was not well visualized  DOPPLER: There was no evidence for stenosis  TRICUSPID VALVE: The valve structure was normal  There was normal leaflet separation  DOPPLER: The transtricuspid velocity was within the normal range  There was no evidence for stenosis  There was trace regurgitation  PULMONIC VALVE: Leaflets exhibited normal thickness, no calcification, and normal cuspal separation  DOPPLER: The transpulmonic velocity was within the normal range  There was no regurgitation  PERICARDIUM: There was no pericardial effusion  The pericardium was normal in appearance  AORTA: The root exhibited normal size      SYSTEM MEASUREMENT TABLES    2D  %FS: 24 2 %  Ao Diam: 2 5 cm  EDV(Teich): 146 1 ml  EF(Teich): 47 6 %  ESV(Teich): 76 5 ml  IVSd: 1 cm  LA Area: 21 5 cm2  LA Diam: 4 4 cm  LVEDV MOD A4C: 202 1 ml  LVEF MOD A4C: 48 9 %  LVESV MOD A4C: 103 3 ml  LVIDd: 5 5 cm  LVIDs: 4 2 cm  LVLd A4C: 10 8 cm  LVLs A4C: 9 6 cm  LVPWd: 1 cm  RA Area: 22 cm2  RVIDd: 4 7 cm  SV MOD A4C: 98 8 ml  SV(Teich): 69 6 ml    CW  TR Vmax: 2 8 m/s  TR maxP 6 mmHg    MM  TAPSE: 2 1 cm    PW  E': 0 1 m/s  E/E': 8 9  MV A Reese: 0 7 m/s  MV Dec [place]: 2 9 m/s2  MV DecT: 271 7 ms  MV E Reese: 0 8 m/s  MV E/A Ratio: 1 1  MV PHT: 78 8 ms  MVA By PHT: 2 8 cm2    [employer] Accredited Echocardiography Laboratory    Prepared and electronically signed by    Davina Guzman MD  Signed 07-Mar-2019 17:25:06       Meds/Allergies   current meds:   Current Facility-Administered Medications   Medication Dose Route Frequency    aspirin chewable tablet 81 mg  81 mg Oral Daily    atorvastatin (LIPITOR) tablet 10 mg  10 mg Oral Daily With Dinner    ceFAZolin (ANCEF) IVPB (premix) 2,000 mg  2,000 mg Intravenous Q8H    guaiFENesin (MUCINEX) 12 hr tablet 600 mg  600 mg Oral Q12H [address]    lisinopril (ZESTRIL) tablet 2 5 mg  2 5 mg Oral Daily    metoprolol succinate (TOPROL-XL) 24 hr tablet 25 mg  25 mg Oral Daily    nicotine (NICODERM CQ) 7 mg/24hr TD 24 hr patch 7 mg  7 mg Transdermal Daily    rivaroxaban (XARELTO) tablet 15 mg  15 mg Oral BID With Meals     No medications prior to admission  Assessment/Plan:  1- NSTEMI type 1 versus type 2 to be determined  Troponins peaked at 4 72  Patient to receive cardiac catheterization today  Further recommendation will based upon catheterization results  Continue aspirin, statin, beta-blocker    2- Unspecified cardiomyopathy with EF of 25-30%  Euvolemic, see above  Continue with beta-blocker, ACE-inhibitor    3- Aspiration Pneumonia/Staph aureus bacteremia  COLEMAN to rule out endocarditis/valvular vegetations in patient with history of drug abuse  Continue with antibiotics    4- Pulmonary embolism  On Xarelto    Counseling / Coordination of Care  Total floor / unit time spent today 20 minutes  Greater than 50% of total time was spent with the patient and / or family counseling and / or coordination of care  ** Please Note: Dragon 360 Dictation voice to text software may have been used in the creation of this document   **

## 2019-03-11 NOTE — ANESTHESIA POSTPROCEDURE EVALUATION
Post-Op Assessment Note    CV Status:  Stable  Pain Score: 0    Pain management: adequate     Mental Status:  Alert and awake   Hydration Status:  Euvolemic   PONV Controlled:  Controlled   Airway Patency:  Patent   Post Op Vitals Reviewed: Yes      Staff: Anesthesiologist, CRNA   Comments: VSS          BP      Temp      Pulse     Resp      SpO2

## 2019-03-11 NOTE — ASSESSMENT & PLAN NOTE
· CTA with evidence of diffuse airspace opacities in the right upper lung and left lung  · Appreciate  Infectious Disease input: the images are not consistent with acute bacterial pneumonia, possibly aspiration pneumatosis  · Strep pneumo and Legionella urine antigens are negative  · Leukocytosis and tachycardia have resolved  · No indication for antibiotics at this time for Aspiration PNA  · O2 sat normal on room air, respiratory status stable

## 2019-03-11 NOTE — ASSESSMENT & PLAN NOTE
· As evidenced by SOB, CTA with subsegmental branch LLL PE  No current SOB and O2 sat normal on room air  · Patient denies any previous history of PE, denies any malignancy, long travel, recent surgeries  He is a smoker  · Follow up thrombophilia panel  · Continue Xarelto  Verify coverage by case management  · Continue telemetry monitoring

## 2019-03-11 NOTE — PROGRESS NOTES
Progress Note - Infectious Disease   Marichuy Bill 46 y o  male MRN: [id number]  Unit/Bed#: -01 Encounter: [id number]      Impression/Plan:  1  Sepsis-POA  Leukocytosis and tachycardia  Appears to be secondary to MSSA bacteremia  No other clear source appreciated  The patient remains hemodynamically stable and nontoxic despite his initial systemic illness  His heart rate has come down and his white cell count has come down  He seems to be tolerating the antibiotics without difficulty   -discontinue vancomycin  -cefazolin 2 g IV q 8 hours  -follow up repeat blood cultures  -monitor CBC with diff and creatinine  -supportive care     2  MSSA bacteremia-in the setting of patient with known injection drug use in the past   Although only 1 set is positive, will need to consider this a true bacteremia in this current clinical setting  Consideration for the possibility of endocarditis  Initial transthoracic echocardiogram is negative for any valvular vegetation   -antibiotics as above  -follow up repeat blood cultures above  -check COLEMAN today  -no PICC line until the blood cultures are negative times 72 hours     3  Injection drug abuse-patient is denying he is currently using, however the current bacteremia is suspicious for ongoing drug use  Hepatitis panel is negative  -recommend host evaluation  -follow-up HIV screen  -the patient is not a candidate for home intravenous antibiotics      4  Abnormal CT of the chest-the ground-glass changes are not really consistent with an acute bacterial pneumonia  Possibly a transient aspiration pneumonitis  Possibly this is a toxin event to the lungs in the setting of active drug use  The patient's respiratory status has improved  -no need to continue antibiotics for aspiration pneumonia  -monitor respiratory status     5  Pulmonary embolism-anticoagulation as per the primary service    6  Cardiomyopathy-unclear etiology    -COLEMAN today  -await coronary angiogram    Discussed in detail with Cardiology    Antibiotics:  Vancomycin 4  Antibiotics 6  Blood cultures -3    Subjective:  Patient has no fever, chills, sweats; no nausea, vomiting, diarrhea; no cough, shortness of breath; no pain  No new symptoms  He seems in better spirits  Objective:  Vitals:  Temp:  [97 9 °F (36 6 °C)-98 2 °F (36 8 °C)] 97 9 °F (36 6 °C)  HR:  [52-71] 56  Resp:  [16-18] 16  BP: (107-123)/(62-85) 123/72  SpO2:  [97 %-100 %] 100 %  Temp (24hrs), Av °F (36 7 °C), Min:97 9 °F (36 6 °C), Max:98 2 °F (36 8 °C)  Current: Temperature: 97 9 °F (36 6 °C)    Physical Exam:   General Appearance:  Alert, interactive, nontoxic, no acute distress  Throat: Oropharynx moist without lesions  Lungs:   Clear to auscultation bilaterally; no wheezes, rhonchi or rales; respirations unlabored   Heart:  RRR; no murmur, rub or gallop   Abdomen:   Soft, non-tender, non-distended, positive bowel sounds  Extremities: No clubbing, cyanosis or edema   Skin: No new rashes or lesions  No draining wounds noted         Labs, Imaging, & Other studies:   All pertinent labs and imaging studies were personally reviewed  Results from last 7 days   Lab Units 19  0512 03/10/19  0534 19  0505   WBC Thousand/uL 6 66 5 63 5 24   HEMOGLOBIN g/dL 13 9 13 1 13 0   PLATELETS Thousands/uL 204 200 185     Results from last 7 days   Lab Units 19  0512 03/10/19  0534 19  0505  19  2148  19  2141   SODIUM mmol/L 139 138 139   < >  --   --  136   POTASSIUM mmol/L 4 0 3 7 4 3   < >  --   --  3 5   CHLORIDE mmol/L 105 104 105   < >  --   --  98*   CO2 mmol/L 24 26 26   < >  --   --  25   CO2, I-STAT mmol/L  --   --   --   --  27   < >  --    BUN mg/dL 8 5 6   < >  --   --  13   CREATININE mg/dL 0 63 0 65 0 59*   < >  --   --  1 29   EGFR ml/min/1 73sq m 125 123 128   < >  --    < > 70   GLUCOSE, ISTAT mg/dl  --   --   --   --  91   < >  --    CALCIUM mg/dL 8 6 8 8 8 5   < >  --   --  8 8   AST U/L  --   --   --   --   --   --  108*   ALT U/L  --   --   --   --   --   --  119*   ALK PHOS U/L  --   --   --   --   --   --  82    < > = values in this interval not displayed  Results from last 7 days   Lab Units 03/08/19  1649 03/08/19  1641 03/07/19  1809 03/06/19  5251   BLOOD CULTURE  No Growth at 48 hrs  No Growth at 48 hrs  --  No Growth After 4 Days    Staphylococcus aureus*   GRAM STAIN RESULT   --   --   --  Gram positive cocci in clusters*   LEGIONELLA URINARY ANTIGEN   --   --  Negative  --

## 2019-03-11 NOTE — DISCHARGE INSTRUCTIONS
Transesophageal Echocardiogram   WHAT YOU NEED TO KNOW:   A transesophageal echocardiogram (COLEMAN) is a procedure used to check for problems with your heart  It will also show any problems in the blood vessels near your heart  Sound waves are sent to the heart through a tube inserted into your throat  The sound waves show the structure and function of your heart through pictures on a monitor  DISCHARGE INSTRUCTIONS:   Rest:  Rest until you are fully awake  You may be sleepy for a while after your procedure  Do not eat or drink until you are able to swallow normally:  Start with soft foods, such as oatmeal, yogurt, or applesauce  Once you can eat soft foods easily, you may begin to eat solid foods  Follow up with your healthcare provider as directed:  Write down your questions so you remember to ask them during your visits  Contact your healthcare provider if:   · You have a fever or chills  · You taste blood in your mouth  · You have a severe sore throat or difficulty swallowing  · You have questions or concerns about your condition or care  Seek care immediately or call 911 if:   · You have any of the following signs of a heart attack:      ¨ Squeezing, pressure, or pain in your chest that lasts longer than 5 minutes or returns    ¨ Discomfort or pain in your back, neck, jaw, stomach, or arm     ¨ Trouble breathing    ¨ Nausea or vomiting    ¨ Lightheadedness or a sudden cold sweat, especially with chest pain or trouble breathing    © 2017 [address] [place] [address] Street is for End User's use only and may not be sold, redistributed or otherwise used for commercial purposes  All illustrations and images included in CareNotes® are the copyrighted property of A D A M [employer] Inc  or Bradley Emmanuel  The above information is an  only  It is not intended as medical advice for individual conditions or treatments   Talk to your doctor, nurse or pharmacist before following any medical regimen to see if it is safe and effective for you

## 2019-03-11 NOTE — PERIOPERATIVE NURSING NOTE
Pt heart rate sandra at 48  Pt asymptomatic  Pt baseline upon arrival 60's - 70's,  Dr Flynn Garcia and Matilde Chavarria aware  Pt resting comfortably in bed

## 2019-03-11 NOTE — PLAN OF CARE
Problem: Potential for Falls  Goal: Patient will remain free of falls  Description  INTERVENTIONS:  - Assess patient frequently for physical needs  -  Identify cognitive and physical deficits and behaviors that affect risk of falls    -  [place] fall precautions as indicated by assessment   - Educate patient/family on patient safety including physical limitations  - Instruct patient to call for assistance with activity based on assessment  - Modify environment to reduce risk of injury  - Consider OT/PT consult to assist with strengthening/mobility  Outcome: Progressing     Problem: PAIN - ADULT  Goal: Verbalizes/displays adequate comfort level or baseline comfort level  Description  Interventions:  - Encourage patient to monitor pain and request assistance  - Assess pain using appropriate pain scale  - Administer analgesics based on type and severity of pain and evaluate response  - Implement non-pharmacological measures as appropriate and evaluate response  - Consider cultural and social influences on pain and pain management  - Notify physician/advanced practitioner if interventions unsuccessful or patient reports new pain  Outcome: Progressing     Problem: INFECTION - ADULT  Goal: Absence or prevention of progression during hospitalization  Description  INTERVENTIONS:  - Assess and monitor for signs and symptoms of infection  - Monitor lab/diagnostic results  - Monitor all insertion sites, i e  indwelling lines, tubes, and drains  - [place] appropriate cooling/warming therapies per order  - Administer medications as ordered  - Instruct and encourage patient and family to use good hand hygiene technique  - Identify and instruct in appropriate isolation precautions for identified infection/condition   Outcome: Progressing     Problem: DISCHARGE PLANNING  Goal: Discharge to home or other facility with appropriate resources  Description  INTERVENTIONS:  - Identify barriers to discharge w/patient and caregiver  - Arrange for needed discharge resources and transportation as appropriate  - Identify discharge learning needs (meds, wound care, etc )  - Refer to Case Management Department for coordinating discharge planning if the patient needs post-hospital services based on physician/advanced practitioner order or complex needs related to functional status, cognitive ability, or social support system   Outcome: Progressing     Problem: Knowledge Deficit  Goal: Patient/family/caregiver demonstrates understanding of disease process, treatment plan, medications, and discharge instructions  Description  Complete learning assessment and assess knowledge base  Interventions:  - Provide teaching at level of understanding  - Provide teaching via preferred learning methods  Outcome: Progressing     Problem: CARDIOVASCULAR - ADULT  Goal: Maintains optimal cardiac output and hemodynamic stability  Description  INTERVENTIONS:  - Monitor I/O, vital signs and rhythm  - Monitor for S/S and trends of decreased cardiac output i e  bleeding, hypotension  - Administer and titrate ordered vasoactive medications to optimize hemodynamic stability  - Assess quality of pulses, skin color and temperature  - Assess for signs of decreased coronary artery perfusion - ex   Angina  - Instruct patient to report change in severity of symptoms  Outcome: Progressing  Goal: Absence of cardiac dysrhythmias or at baseline rhythm  Description  INTERVENTIONS:  - Continuous cardiac monitoring, monitor vital signs, obtain 12 lead EKG if indicated  - Administer antiarrhythmic and heart rate control medications as ordered  - Monitor electrolytes and administer replacement therapy as ordered  Outcome: Progressing     Problem: RESPIRATORY - ADULT  Goal: Achieves optimal ventilation and oxygenation  Description  INTERVENTIONS:  - Assess for changes in respiratory status  - Assess for changes in mentation and behavior  - Position to facilitate oxygenation and minimize respiratory effort  - Oxygen administration by appropriate delivery method based on oxygen saturation (per order) or ABGs  - Initiate smoking cessation education as indicated  - Encourage broncho-pulmonary hygiene including cough, deep breathe, Incentive Spirometry  - Assess the need for suctioning and aspirate as needed  - Assess and instruct to report SOB or any respiratory difficulty  - Respiratory Therapy support as indicated  Outcome: Progressing

## 2019-03-11 NOTE — ANESTHESIA PREPROCEDURE EVALUATION
Review of Systems/Medical History  Patient summary reviewed  Chart reviewed      Cardiovascular  Hyperlipidemia,    Pulmonary  Smoker cigarette smoker  , Pneumonia,        GI/Hepatic  Negative GI/hepatic ROS          Negative  ROS        Endo/Other  Negative endo/other ROS      GYN  Negative gynecology ROS          Hematology  Negative hematology ROS      Musculoskeletal  Negative musculoskeletal ROS        Neurology  Negative neurology ROS      Psychology   Negative psychology ROS              Physical Exam    Airway    Mallampati score: II  TM Distance: >3 FB  Neck ROM: full     Dental   No notable dental hx     Cardiovascular  Rhythm: regular, Rate: normal, Cardiovascular exam normal    Pulmonary  Breath sounds clear to auscultation,     Other Findings        Anesthesia Plan  ASA Score- 2     Anesthesia Type- IV sedation with anesthesia with ASA Monitors  Additional Monitors:   Airway Plan:         Plan Factors-    Induction- intravenous  Postoperative Plan- Plan for postoperative opioid use  Informed Consent- Anesthetic plan and risks discussed with patient  I personally reviewed this patient with the CRNA  Discussed and agreed on the Anesthesia Plan with the CRNA  Ziggy Pathak

## 2019-03-11 NOTE — ASSESSMENT & PLAN NOTE
· As evident by blood cultures positive for MSSA bacteremia in patient with history of IV drug abuse  · For COLEMAN today to rule out endocarditis  · Continue Cefazolin IV  · ID following  · Will likely need 4 week course of antibiotics: however is not a candidate for PICC line until blood cultures negative for 72 hours: patient will not be a candidate for home IV antibiotics given drug history

## 2019-03-11 NOTE — ASSESSMENT & PLAN NOTE
· NSTEMI type I vs  Type II TBD  Troponin peak of 4 72  · Cardiology following,  · Echo shows an EF of 25-30% with diffuse hypokinesis  · On Asa, Metoprolol, Lisinopril  · Cardiac catheterization planned for today  · Denies any chest pain or shortness of breath at present

## 2019-03-11 NOTE — PROGRESS NOTES
Progress Note - Pedraza Ang 1980, 46 y o  male MRN: [id number]    Unit/Bed#: -01 Encounter: [id number]    Primary Care Provider: No primary care provider on file  Date and time admitted to hospital: 3/6/2019  9:31 PM        Bacteremia due to Staphylococcus aureus  Assessment & Plan  · As evident by blood cultures positive for MSSA bacteremia in patient with history of IV drug abuse  · For COLEMAN today to rule out endocarditis  · Continue Cefazolin IV  · ID following  · Will likely need 4 week course of antibiotics: however is not a candidate for PICC line until blood cultures negative for 72 hours: patient will not be a candidate for home IV antibiotics given drug history  Troponin level elevated  Assessment & Plan  · NSTEMI type I vs  Type II TBD  Troponin peak of 4 72  · Cardiology following,  · Echo shows an EF of 25-30% with diffuse hypokinesis  · On Asa, Metoprolol, Lisinopril  · Cardiac catheterization planned for today  · Denies any chest pain or shortness of breath at present  Pulmonary embolus (HCC)  Assessment & Plan  · As evidenced by SOB, CTA with subsegmental branch LLL PE  No current SOB and O2 sat normal on room air  · Patient denies any previous history of PE, denies any malignancy, long travel, recent surgeries  He is a smoker  · Follow up thrombophilia panel  · Continue Xarelto  Verify coverage by case management  · Continue telemetry monitoring  Hepatitis C  Assessment & Plan  · Hepatitis C ab positive  · History of IV drug use  He reports his girlfriend and father had Hep C   · Check HCV RNA Quant and genotype  · Hep B negative  HIV pending  · LFTs last checked 3/6: Ast 108 and  and TB 1 2   · Monitor LFTs - will recheck  Check abdominal ultrasound  · He will need outpatient follow up with GI  PLTs normal     Tobacco abuse  Assessment & Plan  · Pt reports smoking 1/3 PPD  · Nicotine patch  · Encourage cessation      * Pneumonia  Assessment & Plan  · CTA with evidence of diffuse airspace opacities in the right upper lung and left lung  · Appreciate  Infectious Disease input: the images are not consistent with acute bacterial pneumonia, possibly aspiration pneumatosis  · Strep pneumo and Legionella urine antigens are negative  · Leukocytosis and tachycardia have resolved  · No indication for antibiotics at this time for Aspiration PNA  · O2 sat normal on room air, respiratory status stable  VTE Pharmacologic Prophylaxis:   Pharmacologic: Rivaroxaban (Xarelto)  Mechanical VTE Prophylaxis in Place: Yes    Patient Centered Rounds: I have performed bedside rounds with nursing staff today  Discussions with Specialists or Other Care Team Provider: Appreciate ID and cardiology input  Education and Discussions with Family / Patient: Discussed with patient and mother at bedside  Did have patient's mother leave room to inform of positive Hep C ab  Time Spent for Care: 30 minutes  More than 50% of total time spent on counseling and coordination of care as described above  Current Length of Stay: 4 day(s)    Current Patient Status: Inpatient   Certification Statement: The patient will continue to require additional inpatient hospital stay due to further management of above, cardiac cath pending, IV antibiotics for bacteremia  Discharge Plan: Not medically cleared  Code Status: Level 1 - Full Code      Subjective:   Patient denies any complaints at present  No chest pain or SOB  No abdominal pain  No nausea or vomiting  Objective:     Vitals:   Temp (24hrs), Av 9 °F (36 6 °C), Min:97 7 °F (36 5 °C), Max:98 2 °F (36 8 °C)    Temp:  [97 7 °F (36 5 °C)-98 2 °F (36 8 °C)] 97 7 °F (36 5 °C)  HR:  [45-79] 66  Resp:  [16-19] 18  BP: (104-123)/(60-81) 114/60  SpO2:  [97 %-100 %] 97 %  Body mass index is 30 38 kg/m²  Input and Output Summary (last 24 hours):        Intake/Output Summary (Last 24 hours) at 3/11/2019 1352  Last data filed at 3/11/2019 1122  Gross per 24 hour   Intake 900 ml   Output 601 ml   Net 299 ml       Physical Exam:     Physical Exam   Constitutional: He is oriented to person, place, and time  No distress  HENT:   Head: Normocephalic and atraumatic  Eyes: Right eye exhibits no discharge  Left eye exhibits no discharge  Neck: Neck supple  Cardiovascular: Normal rate and regular rhythm  Pulmonary/Chest: Effort normal and breath sounds normal  No respiratory distress  He has no wheezes  Abdominal: Soft  Bowel sounds are normal  He exhibits no distension  There is no tenderness  Musculoskeletal: He exhibits no edema  Neurological: He is alert and oriented to person, place, and time  Skin: He is not diaphoretic  Vitals reviewed  Additional Data:     Labs:    Results from last 7 days   Lab Units 03/11/19  0512  03/07/19  0634   WBC Thousand/uL 6 66   < > 14 75*   HEMOGLOBIN g/dL 13 9   < > 12 8   HEMATOCRIT % 42 7   < > 38 9   PLATELETS Thousands/uL 204   < > 200   NEUTROS PCT %  --   --  66   LYMPHS PCT %  --   --  20   MONOS PCT %  --   --  10   EOS PCT %  --   --  4    < > = values in this interval not displayed  Results from last 7 days   Lab Units 03/11/19  0512  03/06/19  2148  03/06/19  2141   POTASSIUM mmol/L 4 0   < >  --   --  3 5   CHLORIDE mmol/L 105   < >  --   --  98*   CO2 mmol/L 24   < >  --   --  25   CO2, I-STAT mmol/L  --   --  27   < >  --    BUN mg/dL 8   < >  --   --  13   CREATININE mg/dL 0 63   < >  --   --  1 29   CALCIUM mg/dL 8 6   < >  --   --  8 8   ALK PHOS U/L  --   --   --   --  82   ALT U/L  --   --   --   --  119*   AST U/L  --   --   --   --  108*   GLUCOSE, ISTAT mg/dl  --   --  91   < >  --     < > = values in this interval not displayed  Results from last 7 days   Lab Units 03/11/19  0513   INR  1 23*       * I Have Reviewed All Lab Data Listed Above  * Additional Pertinent Lab Tests Reviewed:  All Labs Within Last 24 Hours Reviewed    Imaging:    Imaging Reports Reviewed Today Include: No new imaging yet today  Recent Cultures (last 7 days):     Results from last 7 days   Lab Units 03/08/19  1649 03/08/19  1641 03/07/19  1809 03/06/19  2338   BLOOD CULTURE  No Growth at 48 hrs  No Growth at 48 hrs  --  No Growth After 4 Days  Staphylococcus aureus*   GRAM STAIN RESULT   --   --   --  Gram positive cocci in clusters*   LEGIONELLA URINARY ANTIGEN   --   --  Negative  --        Last 24 Hours Medication List:     Current Facility-Administered Medications:  aspirin 81 mg Oral Daily Kimberlyn Ibrahim MD   atorvastatin 10 mg Oral Daily With Clifford Munoz MD   cefazolin 2,000 mg Intravenous Q8H Nazia Nolan MD   guaiFENesin 600 mg Oral Q12H [address] Karyle Ogren, MD   lisinopril 2 5 mg Oral Daily Kevin El, DO   metoprolol succinate 25 mg Oral Daily Kevin El, DO   nicotine 7 mg Transdermal Daily Meliza Ely PA-C   rivaroxaban 15 mg Oral BID With Meals JOE Prieto        Today, Patient Was Seen By: Alejandro Granados PA-C    ** Please Note: Dictation voice to text software may have been used in the creation of this document   **

## 2019-03-12 ENCOUNTER — HOSPITAL ENCOUNTER (INPATIENT)
Facility: HOSPITAL | Age: 39
LOS: 9 days | Discharge: HOME | DRG: 190 | End: 2019-03-22
Attending: INTERNAL MEDICINE | Admitting: INTERNAL MEDICINE
Payer: COMMERCIAL

## 2019-03-12 ENCOUNTER — APPOINTMENT (INPATIENT)
Dept: INTERVENTIONAL RADIOLOGY/VASCULAR | Facility: HOSPITAL | Age: 39
DRG: 720 | End: 2019-03-12
Attending: INTERNAL MEDICINE
Payer: COMMERCIAL

## 2019-03-12 VITALS
WEIGHT: 224 LBS | TEMPERATURE: 98.4 F | DIASTOLIC BLOOD PRESSURE: 76 MMHG | RESPIRATION RATE: 20 BRPM | OXYGEN SATURATION: 99 % | SYSTOLIC BLOOD PRESSURE: 121 MMHG | HEIGHT: 72 IN | BODY MASS INDEX: 30.34 KG/M2 | HEART RATE: 70 BPM

## 2019-03-12 DIAGNOSIS — B95.61 BACTEREMIA DUE TO STAPHYLOCOCCUS AUREUS: ICD-10-CM

## 2019-03-12 DIAGNOSIS — R78.81 BACTEREMIA DUE TO STAPHYLOCOCCUS AUREUS: ICD-10-CM

## 2019-03-12 DIAGNOSIS — I10 ESSENTIAL HYPERTENSION: ICD-10-CM

## 2019-03-12 DIAGNOSIS — I25.5 ISCHEMIC CARDIOMYOPATHY: ICD-10-CM

## 2019-03-12 DIAGNOSIS — I26.01 ACUTE SEPTIC PULMONARY EMBOLISM WITH ACUTE COR PULMONALE (HCC): ICD-10-CM

## 2019-03-12 DIAGNOSIS — I26.09 OTHER ACUTE PULMONARY EMBOLISM WITH ACUTE COR PULMONALE (HCC): ICD-10-CM

## 2019-03-12 DIAGNOSIS — Z01.818 PRE-OP EVALUATION: ICD-10-CM

## 2019-03-12 DIAGNOSIS — I21.4 NSTEMI (NON-ST ELEVATED MYOCARDIAL INFARCTION) (HCC): Primary | ICD-10-CM

## 2019-03-12 DIAGNOSIS — I25.10 CAD (CORONARY ARTERY DISEASE): ICD-10-CM

## 2019-03-12 PROBLEM — R79.89 TSH ELEVATION: Status: ACTIVE | Noted: 2019-03-12

## 2019-03-12 LAB
ALBUMIN SERPL BCP-MCNC: 3 G/DL (ref 3.5–5)
ALP SERPL-CCNC: 64 U/L (ref 46–116)
ALT SERPL W P-5'-P-CCNC: 124 U/L (ref 12–78)
ANION GAP SERPL CALCULATED.3IONS-SCNC: 9 MMOL/L (ref 4–13)
APTT HEX PL PPP: 7 SEC (ref 0–11)
APTT SCREEN TO CONFIRM RATIO: 0.95 RATIO (ref 0–1.4)
APTT-LA IMM 4:1 NP PPP: 53.4 SEC (ref 0–48.9)
AST SERPL W P-5'-P-CCNC: 82 U/L (ref 5–45)
BACTERIA BLD CULT: NORMAL
BASOPHILS # BLD AUTO: 0.1 THOUSANDS/ΜL (ref 0–0.1)
BASOPHILS NFR BLD AUTO: 1 % (ref 0–1)
BILIRUB DIRECT SERPL-MCNC: 0.11 MG/DL (ref 0–0.2)
BILIRUB SERPL-MCNC: 0.3 MG/DL (ref 0.2–1)
BUN SERPL-MCNC: 10 MG/DL (ref 5–25)
CALCIUM SERPL-MCNC: 8.6 MG/DL (ref 8.3–10.1)
CHLORIDE SERPL-SCNC: 106 MMOL/L (ref 100–108)
CO2 SERPL-SCNC: 25 MMOL/L (ref 21–32)
CONFIRM APTT/NORMAL: 49.6 SEC (ref 0–55)
CREAT SERPL-MCNC: 0.69 MG/DL (ref 0.6–1.3)
EOSINOPHIL # BLD AUTO: 0.45 THOUSAND/ΜL (ref 0–0.61)
EOSINOPHIL NFR BLD AUTO: 6 % (ref 0–6)
ERYTHROCYTE [DISTWIDTH] IN BLOOD BY AUTOMATED COUNT: 13.9 % (ref 11.6–15.1)
F5 GENE MUT ANL BLD/T: NORMAL
GFR SERPL CREATININE-BSD FRML MDRD: 120 ML/MIN/1.73SQ M
GLUCOSE SERPL-MCNC: 118 MG/DL (ref 65–140)
HCT VFR BLD AUTO: 42.1 % (ref 36.5–49.3)
HGB BLD-MCNC: 13.7 G/DL (ref 12–17)
IMM GRANULOCYTES # BLD AUTO: 0.15 THOUSAND/UL (ref 0–0.2)
IMM GRANULOCYTES NFR BLD AUTO: 2 % (ref 0–2)
LA PPP-IMP: ABNORMAL
LYMPHOCYTES # BLD AUTO: 2.25 THOUSANDS/ΜL (ref 0.6–4.47)
LYMPHOCYTES NFR BLD AUTO: 28 % (ref 14–44)
MAGNESIUM SERPL-MCNC: 1.9 MG/DL (ref 1.6–2.6)
MCH RBC QN AUTO: 29.8 PG (ref 26.8–34.3)
MCHC RBC AUTO-ENTMCNC: 32.5 G/DL (ref 31.4–37.4)
MCV RBC AUTO: 92 FL (ref 82–98)
MONOCYTES # BLD AUTO: 0.91 THOUSAND/ΜL (ref 0.17–1.22)
MONOCYTES NFR BLD AUTO: 11 % (ref 4–12)
NEUTROPHILS # BLD AUTO: 4.16 THOUSANDS/ΜL (ref 1.85–7.62)
NEUTS SEG NFR BLD AUTO: 52 % (ref 43–75)
NRBC BLD AUTO-RTO: 0 /100 WBCS
PLATELET # BLD AUTO: 219 THOUSANDS/UL (ref 149–390)
PMV BLD AUTO: 11.5 FL (ref 8.9–12.7)
POTASSIUM SERPL-SCNC: 4.3 MMOL/L (ref 3.5–5.3)
PROT S ACT/NOR PPP: 65 % (ref 63–140)
PROT S ACT/NOR PPP: 68 % (ref 57–157)
PROT S PPP-ACNC: 64 % (ref 60–150)
PROT SERPL-MCNC: 7.1 G/DL (ref 6.4–8.2)
RBC # BLD AUTO: 4.59 MILLION/UL (ref 3.88–5.62)
SCREEN APTT: 60.8 SEC (ref 0–51.9)
SCREEN DRVVT: 36.2 SEC (ref 0–47)
SODIUM SERPL-SCNC: 140 MMOL/L (ref 136–145)
THROMBIN TIME: 23.6 SEC (ref 0–23)
WBC # BLD AUTO: 8.02 THOUSAND/UL (ref 4.31–10.16)

## 2019-03-12 PROCEDURE — 99232 SBSQ HOSP IP/OBS MODERATE 35: CPT | Performed by: INTERNAL MEDICINE

## 2019-03-12 PROCEDURE — C1769 GUIDE WIRE: HCPCS | Performed by: INTERNAL MEDICINE

## 2019-03-12 PROCEDURE — 99153 MOD SED SAME PHYS/QHP EA: CPT | Performed by: INTERNAL MEDICINE

## 2019-03-12 PROCEDURE — 4A023N7 MEASUREMENT OF CARDIAC SAMPLING AND PRESSURE, LEFT HEART, PERCUTANEOUS APPROACH: ICD-10-PCS | Performed by: INTERNAL MEDICINE

## 2019-03-12 PROCEDURE — 85025 COMPLETE CBC W/AUTO DIFF WBC: CPT | Performed by: PHYSICIAN ASSISTANT

## 2019-03-12 PROCEDURE — 99152 MOD SED SAME PHYS/QHP 5/>YRS: CPT | Performed by: INTERNAL MEDICINE

## 2019-03-12 PROCEDURE — B2151ZZ FLUOROSCOPY OF LEFT HEART USING LOW OSMOLAR CONTRAST: ICD-10-PCS | Performed by: INTERNAL MEDICINE

## 2019-03-12 PROCEDURE — 99239 HOSP IP/OBS DSCHRG MGMT >30: CPT | Performed by: PHYSICIAN ASSISTANT

## 2019-03-12 PROCEDURE — 80076 HEPATIC FUNCTION PANEL: CPT | Performed by: PHYSICIAN ASSISTANT

## 2019-03-12 PROCEDURE — 99233 SBSQ HOSP IP/OBS HIGH 50: CPT | Performed by: INTERNAL MEDICINE

## 2019-03-12 PROCEDURE — 83735 ASSAY OF MAGNESIUM: CPT | Performed by: PHYSICIAN ASSISTANT

## 2019-03-12 PROCEDURE — B2111ZZ FLUOROSCOPY OF MULTIPLE CORONARY ARTERIES USING LOW OSMOLAR CONTRAST: ICD-10-PCS | Performed by: INTERNAL MEDICINE

## 2019-03-12 PROCEDURE — 93458 L HRT ARTERY/VENTRICLE ANGIO: CPT | Performed by: INTERNAL MEDICINE

## 2019-03-12 PROCEDURE — 87522 HEPATITIS C REVRS TRNSCRPJ: CPT | Performed by: PHYSICIAN ASSISTANT

## 2019-03-12 PROCEDURE — C1887 CATHETER, GUIDING: HCPCS | Performed by: INTERNAL MEDICINE

## 2019-03-12 PROCEDURE — C1894 INTRO/SHEATH, NON-LASER: HCPCS | Performed by: INTERNAL MEDICINE

## 2019-03-12 PROCEDURE — 80048 BASIC METABOLIC PNL TOTAL CA: CPT | Performed by: PHYSICIAN ASSISTANT

## 2019-03-12 PROCEDURE — 87902 NFCT AGT GNTYP ALYS HEP C: CPT | Performed by: PHYSICIAN ASSISTANT

## 2019-03-12 RX ORDER — LIDOCAINE HYDROCHLORIDE 10 MG/ML
INJECTION, SOLUTION INFILTRATION; PERINEURAL CODE/TRAUMA/SEDATION MEDICATION
Status: COMPLETED | OUTPATIENT
Start: 2019-03-12 | End: 2019-03-12

## 2019-03-12 RX ORDER — GUAIFENESIN 600 MG
600 TABLET, EXTENDED RELEASE 12 HR ORAL EVERY 12 HOURS SCHEDULED
Status: CANCELLED | OUTPATIENT
Start: 2019-03-12

## 2019-03-12 RX ORDER — MIDAZOLAM HYDROCHLORIDE 1 MG/ML
INJECTION INTRAMUSCULAR; INTRAVENOUS CODE/TRAUMA/SEDATION MEDICATION
Status: COMPLETED | OUTPATIENT
Start: 2019-03-12 | End: 2019-03-12

## 2019-03-12 RX ORDER — VERAPAMIL HCL 2.5 MG/ML
AMPUL (ML) INTRAVENOUS CODE/TRAUMA/SEDATION MEDICATION
Status: COMPLETED | OUTPATIENT
Start: 2019-03-12 | End: 2019-03-12

## 2019-03-12 RX ORDER — NITROGLYCERIN 20 MG/100ML
INJECTION INTRAVENOUS CODE/TRAUMA/SEDATION MEDICATION
Status: COMPLETED | OUTPATIENT
Start: 2019-03-12 | End: 2019-03-12

## 2019-03-12 RX ORDER — CEFAZOLIN SODIUM 2 G/50ML
2000 SOLUTION INTRAVENOUS EVERY 8 HOURS
Status: CANCELLED | OUTPATIENT
Start: 2019-03-13

## 2019-03-12 RX ORDER — DIPHENHYDRAMINE HYDROCHLORIDE 50 MG/ML
50 INJECTION INTRAMUSCULAR; INTRAVENOUS ONCE
Status: COMPLETED | OUTPATIENT
Start: 2019-03-12 | End: 2019-03-12

## 2019-03-12 RX ORDER — ASPIRIN 81 MG/1
81 TABLET, CHEWABLE ORAL DAILY
Status: CANCELLED | OUTPATIENT
Start: 2019-03-13

## 2019-03-12 RX ORDER — METOPROLOL SUCCINATE 25 MG/1
25 TABLET, EXTENDED RELEASE ORAL DAILY
Status: CANCELLED | OUTPATIENT
Start: 2019-03-13

## 2019-03-12 RX ORDER — LISINOPRIL 2.5 MG/1
2.5 TABLET ORAL DAILY
Status: CANCELLED | OUTPATIENT
Start: 2019-03-13

## 2019-03-12 RX ORDER — FENTANYL CITRATE 50 UG/ML
INJECTION, SOLUTION INTRAMUSCULAR; INTRAVENOUS CODE/TRAUMA/SEDATION MEDICATION
Status: COMPLETED | OUTPATIENT
Start: 2019-03-12 | End: 2019-03-12

## 2019-03-12 RX ORDER — ATORVASTATIN CALCIUM 10 MG/1
10 TABLET, FILM COATED ORAL
Status: CANCELLED | OUTPATIENT
Start: 2019-03-13

## 2019-03-12 RX ADMIN — CEFAZOLIN SODIUM 2000 MG: 2 SOLUTION INTRAVENOUS at 02:13

## 2019-03-12 RX ADMIN — RIVAROXABAN 15 MG: 15 TABLET, FILM COATED ORAL at 17:41

## 2019-03-12 RX ADMIN — LISINOPRIL 2.5 MG: 2.5 TABLET ORAL at 08:57

## 2019-03-12 RX ADMIN — VERAPAMIL HYDROCHLORIDE 2.5 MG: 2.5 INJECTION, SOLUTION INTRAVENOUS at 11:03

## 2019-03-12 RX ADMIN — ASPIRIN 81 MG 81 MG: 81 TABLET ORAL at 08:51

## 2019-03-12 RX ADMIN — LIDOCAINE HYDROCHLORIDE 1 ML: 10 INJECTION, SOLUTION INFILTRATION; PERINEURAL at 11:02

## 2019-03-12 RX ADMIN — NICOTINE 7 MG: 7 PATCH TRANSDERMAL at 08:54

## 2019-03-12 RX ADMIN — RIVAROXABAN 15 MG: 15 TABLET, FILM COATED ORAL at 08:51

## 2019-03-12 RX ADMIN — IOHEXOL 130 ML: 350 INJECTION, SOLUTION INTRAVENOUS at 11:29

## 2019-03-12 RX ADMIN — GUAIFENESIN 600 MG: 600 TABLET, EXTENDED RELEASE ORAL at 21:03

## 2019-03-12 RX ADMIN — FENTANYL CITRATE 50 MCG: 50 INJECTION, SOLUTION INTRAMUSCULAR; INTRAVENOUS at 11:20

## 2019-03-12 RX ADMIN — FENTANYL CITRATE 50 MCG: 50 INJECTION, SOLUTION INTRAMUSCULAR; INTRAVENOUS at 11:02

## 2019-03-12 RX ADMIN — CEFAZOLIN SODIUM 2000 MG: 2 SOLUTION INTRAVENOUS at 17:39

## 2019-03-12 RX ADMIN — MIDAZOLAM HYDROCHLORIDE 1 MG: 1 INJECTION, SOLUTION INTRAMUSCULAR; INTRAVENOUS at 11:02

## 2019-03-12 RX ADMIN — DIPHENHYDRAMINE HYDROCHLORIDE 50 MG: 50 INJECTION, SOLUTION INTRAMUSCULAR; INTRAVENOUS at 21:03

## 2019-03-12 RX ADMIN — CEFAZOLIN SODIUM 2000 MG: 2 SOLUTION INTRAVENOUS at 08:51

## 2019-03-12 RX ADMIN — NITROGLYCERIN 200 MCG: 20 INJECTION INTRAVENOUS at 11:02

## 2019-03-12 RX ADMIN — GUAIFENESIN 600 MG: 600 TABLET, EXTENDED RELEASE ORAL at 08:57

## 2019-03-12 RX ADMIN — ATORVASTATIN CALCIUM 10 MG: 10 TABLET, FILM COATED ORAL at 17:40

## 2019-03-12 NOTE — SOCIAL WORK
CM received order from [address] [place] [address], Dr Stas Torres for a Life Vest   CM faxed packet to [address] [place] [address] and informed them that the Cardiac Cath has been ordered for today  Alexander Zafar from [facility] states that the packet has been received and had asked that the results of the Cardiac Cath need to be faxed over as soon as available, because the insurance is going to deny the request w/out the results  CM to f/u and send over the test results when available

## 2019-03-12 NOTE — PROGRESS NOTES
Progress Note - Alexis Deejay 1980, 46 y o  male MRN: [id number]    Unit/Bed#: -01 Encounter: [id number]    Primary Care Provider: No primary care provider on file  Date and time admitted to hospital: 3/6/2019  9:31 PM        Bacteremia due to Staphylococcus aureus  Assessment & Plan  · As evident by blood cultures positive for MSSA bacteremia in patient with history of IV drug abuse  · COLEMAN negative for vegetation/endocarditis  · Continue Cefazolin IV  · ID following  · Patient will need IV antibiotics through 3/22  He is now a candidate for PICC line as repeat blood cultures negative for 72 hours if needed  Troponin level elevated  Assessment & Plan  · NSTEMI type I vs  Type II TBD  Troponin peak of 4 72  · Cardiology following,  · Echo shows an EF of 25-30% with diffuse hypokinesis  · On Asa, Metoprolol, Lisinopril  · Cardiac catheterization planned for today  · Denies any chest pain or shortness of breath at present  Pulmonary embolus (HCC)  Assessment & Plan  · As evidenced by SOB, CTA with subsegmental branch LLL PE  No current SOB and O2 sat normal on room air  · Patient denies any previous history of PE, denies any malignancy, long travel, recent surgeries  He is a smoker  · Follow up thrombophilia panel  Antithrombin III was low but checked after already starting Heparin  Will need follow up with Hem/onc as outpatient  · Continue Xarelto  Case management to verify coverage  · Continue telemetry monitoring  TSH elevation  Assessment & Plan  · TSH elevated at 8 232  · Check Free T4  Hepatitis C  Assessment & Plan  · Hepatitis C ab positive  · History of IV drug use  He reports his girlfriend and father had Hep C   · Check HCV RNA Quant and genotype  · Hep B negative  HIV pending  · LFTs elevated: Ast 82 and  and TB 0 3   · Monitor LFTs  Check abdominal ultrasound  · He will need outpatient follow up with GI   PLTs normal     Tobacco abuse  Assessment & Plan  · Pt reports smoking 1/3 PPD  · Nicotine patch  · Encourage cessation  * Pneumonia  Assessment & Plan  · CTA with evidence of diffuse airspace opacities in the right upper lung and left lung  · Appreciate  Infectious Disease input: the images are not consistent with acute bacterial pneumonia, possibly aspiration pneumatosis  · Strep pneumo and Legionella urine antigens are negative  · Leukocytosis and tachycardia have resolved  · No indication for antibiotics at this time for Aspiration PNA  · O2 sat normal on room air, respiratory status stable  VTE Pharmacologic Prophylaxis:   Pharmacologic: Rivaroxaban (Xarelto)    Patient Centered Rounds: I discussed with nurse outside of patient's room  Discussions with Specialists or Other Care Team Provider: Appreciate ID and cardiology input  Discussed with hematology  Education and Discussions with Family / Patient: Not medically cleared  Time Spent for Care: 30 minutes  More than 50% of total time spent on counseling and coordination of care as described above  Current Length of Stay: 5 day(s)    Current Patient Status: Inpatient   Certification Statement: The patient will continue to require additional inpatient hospital stay due to need for cardiac cath and need for IV antibiotics for staph bacteremia  Discharge Plan: Not medically cleared  Code Status: Level 1 - Full Code      Subjective:   Patient denies any complaints at present  No chest pain or SOB  No nausea or vomiting  No abdominal pain  Objective:     Vitals:   Temp (24hrs), Av 9 °F (36 6 °C), Min:97 3 °F (36 3 °C), Max:98 42 °F (36 9 °C)    Temp:  [97 3 °F (36 3 °C)-98 42 °F (36 9 °C)] 97 9 °F (36 6 °C)  HR:  [45-79] 59  Resp:  [16-19] 18  BP: ()/(55-69) 118/62  SpO2:  [97 %-100 %] 97 %  Body mass index is 30 38 kg/m²  Input and Output Summary (last 24 hours):        Intake/Output Summary (Last 24 hours) at 3/12/2019 1115  Last data filed at 3/12/2019 0100  Gross per 24 hour   Intake 500 ml   Output 450 ml   Net 50 ml       Physical Exam:     Physical Exam   Constitutional: He is oriented to person, place, and time  No distress  HENT:   Head: Normocephalic and atraumatic  Eyes: No scleral icterus  Neck: Neck supple  Cardiovascular: Normal rate and regular rhythm  Murmur heard  Pulmonary/Chest: Effort normal  No stridor  No respiratory distress  He has no wheezes  Abdominal: Soft  Bowel sounds are normal  There is no tenderness  Musculoskeletal: He exhibits no edema  Neurological: He is alert and oriented to person, place, and time  Skin: He is not diaphoretic  Vitals reviewed  Additional Data:     Labs:    Results from last 7 days   Lab Units 03/12/19  0503   WBC Thousand/uL 8 02   HEMOGLOBIN g/dL 13 7   HEMATOCRIT % 42 1   PLATELETS Thousands/uL 219   NEUTROS PCT % 52   LYMPHS PCT % 28   MONOS PCT % 11   EOS PCT % 6     Results from last 7 days   Lab Units 03/12/19  0503  03/06/19  2148   POTASSIUM mmol/L 4 3   < >  --    CHLORIDE mmol/L 106   < >  --    CO2 mmol/L 25   < >  --    CO2, I-STAT mmol/L  --   --  27   BUN mg/dL 10   < >  --    CREATININE mg/dL 0 69   < >  --    CALCIUM mg/dL 8 6   < >  --    ALK PHOS U/L 64  --   --    ALT U/L 124*  --   --    AST U/L 82*  --   --    GLUCOSE, ISTAT mg/dl  --   --  91    < > = values in this interval not displayed  Results from last 7 days   Lab Units 03/11/19  0513   INR  1 23*       * I Have Reviewed All Lab Data Listed Above  * Additional Pertinent Lab Tests Reviewed: All Labs Within Last 24 Hours Reviewed    Imaging:    Imaging Reports Reviewed Today Include: COLEMAN report reviewed  Recent Cultures (last 7 days):     Results from last 7 days   Lab Units 03/08/19  1649 03/08/19  1641 03/07/19  1809 03/06/19  2338   BLOOD CULTURE  No Growth at 72 hrs  No Growth at 72 hrs   --  No Growth After 5 Days    Staphylococcus aureus*   GRAM STAIN RESULT   --   --   -- Gram positive cocci in clusters*   LEGIONELLA URINARY ANTIGEN   --   --  Negative  --        Last 24 Hours Medication List:     Current Facility-Administered Medications:  aspirin 81 mg Oral Daily Whitney Romero MD    atorvastatin 10 mg Oral Daily With Tc Beasley MD    cefazolin 2,000 mg Intravenous Q8H Praveena Lee MD Last Rate: 2,000 mg (03/12/19 0851)   fentanyl citrate (PF)  Intravenous Code/Trauma/Sedation Wilman Bowie MD    guaiFENesin 600 mg Oral Q12H Haider Patel MD    lidocaine   Code/Trauma/Sedation Wilman Bowie MD    lisinopril 2 5 mg Oral Daily Viridiana Thompson DO    metoprolol succinate 25 mg Oral Daily Viridiana Thompson,     midazolam   Code/Trauma/Sedation Wilman Bowie MD    nicotine 7 mg Transdermal Daily Prem Stone PA-C    nitroGLYcerin  Intra-arterial Code/Trauma/Sedation Wilman Bowie MD    rivaroxaban 15 mg Oral BID With Meals JOE Fernando    verapamil  Intra-arterial Code/Trauma/Sedation Wilman Bowie MD         Today, Patient Was Seen By: Gutierrez Contreras PA-C    ** Please Note: Dictation voice to text software may have been used in the creation of this document   **

## 2019-03-12 NOTE — PLAN OF CARE
Problem: DISCHARGE PLANNING - CARE MANAGEMENT  Goal: Discharge to post-acute care or home with appropriate resources  Description  INTERVENTIONS:  - Conduct assessment to determine patient/family and health care team treatment goals, and need for post-acute services based on payer coverage, community resources, and patient preferences, and barriers to discharge  - Address psychosocial, clinical, and financial barriers to discharge as identified in assessment in conjunction with the patient/family and health care team  - Arrange appropriate level of post-acute services according to patient?s   needs and preference and payer coverage in collaboration with the physician and health care team  - Communicate with and update the patient/family, physician, and health care team regarding progress on the discharge plan  - Arrange appropriate transportation to post-acute venues  Outcome: Progressing  Note:   CM received order from [address] [place] [address], Dr Lei Elliott for a Life Vest   CM faxed packet to [address] [place], [place] [address] and informed them that the Cardiac Cath has been ordered for today  Red Yun from [facility] states that the packet has been received and had asked that the results of the Cardiac Cath need to be faxed over as soon as available, because the insurance is going to deny the request w/out the results  CM to f/u and send over the test results when available

## 2019-03-12 NOTE — ASSESSMENT & PLAN NOTE
· Hepatitis C ab positive  · History of IV drug use  He reports his girlfriend and father had Hep C   · Check HCV RNA Quant and genotype  · Hep B negative  HIV pending  · LFTs elevated: Ast 82 and  and TB 0 3   · Monitor LFTs  Check abdominal ultrasound  · He will need outpatient follow up with GI   PLTs normal

## 2019-03-12 NOTE — PLAN OF CARE
Problem: Potential for Falls  Goal: Patient will remain free of falls  Description  INTERVENTIONS:  - Assess patient frequently for physical needs  -  Identify cognitive and physical deficits and behaviors that affect risk of falls    -  Heppner fall precautions as indicated by assessment   - Educate patient/family on patient safety including physical limitations  - Instruct patient to call for assistance with activity based on assessment  - Modify environment to reduce risk of injury  - Consider OT/PT consult to assist with strengthening/mobility  Outcome: Progressing     Problem: PAIN - ADULT  Goal: Verbalizes/displays adequate comfort level or baseline comfort level  Description  Interventions:  - Encourage patient to monitor pain and request assistance  - Assess pain using appropriate pain scale  - Administer analgesics based on type and severity of pain and evaluate response  - Implement non-pharmacological measures as appropriate and evaluate response  - Consider cultural and social influences on pain and pain management  - Notify physician/advanced practitioner if interventions unsuccessful or patient reports new pain  Outcome: Progressing     Problem: INFECTION - ADULT  Goal: Absence or prevention of progression during hospitalization  Description  INTERVENTIONS:  - Assess and monitor for signs and symptoms of infection  - Monitor lab/diagnostic results  - Monitor all insertion sites, i e  indwelling lines, tubes, and drains  - Heppner appropriate cooling/warming therapies per order  - Administer medications as ordered  - Instruct and encourage patient and family to use good hand hygiene technique  - Identify and instruct in appropriate isolation precautions for identified infection/condition   Outcome: Progressing     Problem: DISCHARGE PLANNING  Goal: Discharge to home or other facility with appropriate resources  Description  INTERVENTIONS:  - Identify barriers to discharge w/patient and caregiver  - Arrange for needed discharge resources and transportation as appropriate  - Identify discharge learning needs (meds, wound care, etc )  - Refer to Case Management Department for coordinating discharge planning if the patient needs post-hospital services based on physician/advanced practitioner order or complex needs related to functional status, cognitive ability, or social support system   Outcome: Progressing     Problem: Knowledge Deficit  Goal: Patient/family/caregiver demonstrates understanding of disease process, treatment plan, medications, and discharge instructions  Description  Complete learning assessment and assess knowledge base  Interventions:  - Provide teaching at level of understanding  - Provide teaching via preferred learning methods  Outcome: Progressing     Problem: CARDIOVASCULAR - ADULT  Goal: Maintains optimal cardiac output and hemodynamic stability  Description  INTERVENTIONS:  - Monitor I/O, vital signs and rhythm  - Monitor for S/S and trends of decreased cardiac output i e  bleeding, hypotension  - Administer and titrate ordered vasoactive medications to optimize hemodynamic stability  - Assess quality of pulses, skin color and temperature  - Assess for signs of decreased coronary artery perfusion - ex   Angina  - Instruct patient to report change in severity of symptoms  Outcome: Progressing  Goal: Absence of cardiac dysrhythmias or at baseline rhythm  Description  INTERVENTIONS:  - Continuous cardiac monitoring, monitor vital signs, obtain 12 lead EKG if indicated  - Administer antiarrhythmic and heart rate control medications as ordered  - Monitor electrolytes and administer replacement therapy as ordered  Outcome: Progressing     Problem: RESPIRATORY - ADULT  Goal: Achieves optimal ventilation and oxygenation  Description  INTERVENTIONS:  - Assess for changes in respiratory status  - Assess for changes in mentation and behavior  - Position to facilitate oxygenation and minimize respiratory effort  - Oxygen administration by appropriate delivery method based on oxygen saturation (per order) or ABGs  - Initiate smoking cessation education as indicated  - Encourage broncho-pulmonary hygiene including cough, deep breathe, Incentive Spirometry  - Assess the need for suctioning and aspirate as needed  - Assess and instruct to report SOB or any respiratory difficulty  - Respiratory Therapy support as indicated  Outcome: Progressing     Problem: DISCHARGE PLANNING - CARE MANAGEMENT  Goal: Discharge to post-acute care or home with appropriate resources  Description  INTERVENTIONS:  - Conduct assessment to determine patient/family and health care team treatment goals, and need for post-acute services based on payer coverage, community resources, and patient preferences, and barriers to discharge  - Address psychosocial, clinical, and financial barriers to discharge as identified in assessment in conjunction with the patient/family and health care team  - Arrange appropriate level of post-acute services according to patient?s   needs and preference and payer coverage in collaboration with the physician and health care team  - Communicate with and update the patient/family, physician, and health care team regarding progress on the discharge plan  - Arrange appropriate transportation to post-acute venues  Outcome: Progressing

## 2019-03-12 NOTE — PLAN OF CARE
Problem: DISCHARGE PLANNING - CARE MANAGEMENT  Goal: Discharge to post-acute care or home with appropriate resources  Description  INTERVENTIONS:  - Conduct assessment to determine patient/family and health care team treatment goals, and need for post-acute services based on payer coverage, community resources, and patient preferences, and barriers to discharge  - Address psychosocial, clinical, and financial barriers to discharge as identified in assessment in conjunction with the patient/family and health care team  - Arrange appropriate level of post-acute services according to patient?s   needs and preference and payer coverage in collaboration with the physician and health care team  - Communicate with and update the patient/family, physician, and health care team regarding progress on the discharge plan  - Arrange appropriate transportation to post-acute venues  3/12/2019 1514 by Lyndsey Christopher  Outcome: Progressing  Note:   CM was informed by SLIM, that the pt would be transferred to [facility] for further medical treatment  The CM explained that a life vest has been requested and the results of the cardiac cath have been sent  CM spoke with Meenakshi Barrios at [facility] and informed him of the possible transfer today or tomorrow  Meenakshi Barrios stated that the pt could be fitted for the Vest tonight at [facility]  CM contacted Griffin Hashimoto to determine if [facility] has a bed  CM to f/u with [facility]

## 2019-03-12 NOTE — PLAN OF CARE
Problem: DISCHARGE PLANNING - CARE MANAGEMENT  Goal: Discharge to post-acute care or home with appropriate resources  Description  INTERVENTIONS:  - Conduct assessment to determine patient/family and health care team treatment goals, and need for post-acute services based on payer coverage, community resources, and patient preferences, and barriers to discharge  - Address psychosocial, clinical, and financial barriers to discharge as identified in assessment in conjunction with the patient/family and health care team  - Arrange appropriate level of post-acute services according to patient?s   needs and preference and payer coverage in collaboration with the physician and health care team  - Communicate with and update the patient/family, physician, and health care team regarding progress on the discharge plan  - Arrange appropriate transportation to post-acute venues  Outcome: Completed  Note:   Pt to be transferred to [facility] for cardiac services not provided at [facility]  Insurance auth for ALS transport obtained from [facility] #[id number]  Pt needs a Life Perez Gall from [place] [id number] notified that pt is going to be transferred to [place]  If [place] [address] has obtained insurance auth, he will deliver the LifeVest here  If it has not been obtained, he will have it delivered to [facility] done and faxed to [facility] and placed in pt's chart

## 2019-03-12 NOTE — SOCIAL WORK
Pt to be transferred to [facility] for cardiac services not provided at [facility]  Insurance auth for ALS transport obtained from [facility] #[id number]  Pt needs a Life Julius Kay from [place] notified that pt is going to be transferred to [facility]  If [place] has obtained insurance auth, he will deliver the LifeVest here  If it has not been obtained, he will have it delivered to [facility]  Medical [facility] done and faxed to [facility] and placed in pt's chart

## 2019-03-12 NOTE — PROGRESS NOTES
Progress Note - Infectious Disease   Elio Gamino 46 y o  male MRN: [id number]  Unit/Bed#: -01 Encounter: [id number]      Impression/Plan:  1  Sepsis-POA   Leukocytosis and tachycardia   Appears to be secondary to MSSA bacteremia   No other clear source appreciated   The patient remains hemodynamically stable and nontoxic despite his initial systemic illness   His heart rate has come down and his white cell count has come down   He seems to be tolerating the antibiotics without difficulty  -continue cefazolin through 3/22/2019 to complete 2 weeks of intravenous antibiotics from the 1st negative blood culture  -follow up repeat blood cultures  -monitor CBC with diff and creatinine  -supportive care     2  MSSA bacteremia-in the setting of patient with known injection drug use in the past  Willim Fus only 1 set is positive, will need to consider this a true bacteremia in this current clinical setting   Consideration for the possibility of endocarditis  Transthoracic and transesophageal echocardiograms are both negative for valvular vegetations  As this was a low-grade, transient bacteremia at worst, very unlikely that this represents an endovascular infection  -antibiotics as above  -follow up repeat blood cultures above  -no contraindication to placing a PICC at this time if the patient needs a PICC line      3  Injection drug abuse-patient is denying he is currently using, however the current bacteremia is suspicious for ongoing drug use  complicated by hepatitis-C   -recommend host evaluation  -follow-up HIV screen  -the patient is not a candidate for home intravenous antibiotics     4  Hepatitis C-in the setting of injection drug use  Liver function tests remain abnormal  -check hep C RNA to confirm active infection  -monitor liver function test     5   Abnormal CT of the chest-the ground-glass changes are not really consistent with an acute bacterial pneumonia   Possibly a transient aspiration pneumonitis   Possibly this is a toxin event to the lungs in the setting of active drug use  The patient's respiratory status has improved  -no need to continue antibiotics for aspiration pneumonia  -monitor respiratory status     6  Pulmonary embolism-anticoagulation as per the primary service     7  Cardiomyopathy-unclear etiology   -await coronary angiogram    Antibiotics:  Cefazolin 2  Antibiotics 7  Blood cultures negative x4 days    Subjective:  Patient has no fever, chills, sweats; no nausea, vomiting, diarrhea; no cough, shortness of breath; no pain  No new symptoms  He underwent COLEMAN yesterday  He is anxious to get his coronary angiogram done  Objective:  Vitals:  Temp:  [97 3 °F (36 3 °C)-98 42 °F (36 9 °C)] 97 9 °F (36 6 °C)  HR:  [45-79] 59  Resp:  [16-19] 18  BP: ()/(55-69) 118/62  SpO2:  [97 %-100 %] 97 %  Temp (24hrs), Av 9 °F (36 6 °C), Min:97 3 °F (36 3 °C), Max:98 42 °F (36 9 °C)  Current: Temperature: 97 9 °F (36 6 °C)    Physical Exam:   General Appearance:  Alert, interactive, nontoxic, no acute distress  Throat: Oropharynx moist without lesions  Lungs:   Clear to auscultation bilaterally; no wheezes, rhonchi or rales; respirations unlabored   Heart:  RRR; no murmur, rub or gallop   Abdomen:   Soft, non-tender, non-distended, positive bowel sounds  Extremities: No clubbing, cyanosis or edema   Skin: No new rashes or lesions  No draining wounds noted         Labs, Imaging, & Other studies:   All pertinent labs and imaging studies were personally reviewed  Results from last 7 days   Lab Units 19  0503 19  0512 03/10/19  0534   WBC Thousand/uL 8 02 6 66 5 63   HEMOGLOBIN g/dL 13 7 13 9 13 1   PLATELETS Thousands/uL 219 204 200     Results from last 7 days   Lab Units 19  0503 19  0512 03/10/19  0534  19  2148  19  2141   SODIUM mmol/L 140 139 138   < >  --   --  136   POTASSIUM mmol/L 4 3 4 0 3 7   < >  --   --  3 5   CHLORIDE mmol/L 106 105 104   < >  --   --  98*   CO2 mmol/L 25 24 26   < >  --   --  25   CO2, I-STAT mmol/L  --   --   --   --  27   < >  --    BUN mg/dL 10 8 5   < >  --   --  13   CREATININE mg/dL 0 69 0 63 0 65   < >  --   --  1 29   EGFR ml/min/1 73sq m 120 125 123   < >  --    < > 70   GLUCOSE, ISTAT mg/dl  --   --   --   --  91   < >  --    CALCIUM mg/dL 8 6 8 6 8 8   < >  --   --  8 8   AST U/L 82*  --   --   --   --   --  108*   ALT U/L 124*  --   --   --   --   --  119*   ALK PHOS U/L 64  --   --   --   --   --  82    < > = values in this interval not displayed  Results from last 7 days   Lab Units 03/08/19  1649 03/08/19  1641 03/07/19  1809 03/06/19  7328   BLOOD CULTURE  No Growth at 72 hrs  No Growth at 72 hrs   --  No Growth After 5 Days    Staphylococcus aureus*   GRAM STAIN RESULT   --   --   --  Gram positive cocci in clusters*   LEGIONELLA URINARY ANTIGEN   --   --  Negative  --        Hepatitis C antibody positive    Transesophageal echocardiogram-no valvular vegetation reported

## 2019-03-12 NOTE — DISCHARGE SUMMARY
Discharge- Casie Barnett 1980, 46 y o  male MRN: [id number]    Unit/Bed#: -01 Encounter: [id number]    Primary Care Provider: No primary care provider on file  Date and time admitted to hospital: 3/6/2019  9:31 PM        Troponin level elevated  Assessment & Plan  · NSTEMI type I vs  Type II TBD  Troponin peak of 4 72  · Cardiology following,  · Echo shows an EF of 25-30% with diffuse hypokinesis  · On Asa, Metoprolol, Lisinopril  · Denies any chest pain or shortness of breath at present  · LifeVest ordered  · He underwent a cardiac cath today  He requires transfer to [facility] for CABG  Dr Ese Fowler discussed case with Dr Loi Collazo  · He has been accepted for transfer to [facility] today  Bacteremia due to Staphylococcus aureus  Assessment & Plan  · As evident by blood cultures positive for MSSA bacteremia in patient with history of IV drug abuse  · COLEMAN negative for vegetation/endocarditis  · Continue Cefazolin IV  · ID following  · Patient will need IV antibiotics through 3/22  He is now a candidate for PICC line as repeat blood cultures negative for 72 hours if needed  Pulmonary embolus (HCC)  Assessment & Plan  · As evidenced by SOB, CTA with subsegmental branch LLL PE  No current SOB and O2 sat normal on room air  · Patient denies any previous history of PE, denies any malignancy, long travel, recent surgeries  He is a smoker  · Follow up thrombophilia panel  Antithrombin III was low but checked after already starting Heparin  Will need follow up with Hem/onc as outpatient  · Continue Xarelto  · Continue telemetry monitoring  TSH elevation  Assessment & Plan  · TSH elevated at 8 232  · Check Free T4  Hepatitis C  Assessment & Plan  · Hepatitis C ab positive  · History of IV drug use  He reports his girlfriend and father had Hep C   · Check HCV RNA Quant and genotype  · Hep B negative  HIV pending  · LFTs elevated: Ast 82 and  and TB 0 3   · Monitor LFTs    Check abdominal ultrasound  · He will need outpatient follow up with GI  PLTs normal     Tobacco abuse  Assessment & Plan  · Pt reports smoking 1/3 PPD  · Nicotine patch  · Encourage cessation  * Pneumonia  Assessment & Plan  · CTA with evidence of diffuse airspace opacities in the right upper lung and left lung  · Appreciate  Infectious Disease input: the images are not consistent with acute bacterial pneumonia, possibly aspiration pneumatosis  · Strep pneumo and Legionella urine antigens are negative  · Leukocytosis and tachycardia have resolved  · No indication for antibiotics at this time for Aspiration PNA  · O2 sat normal on room air, respiratory status stable  Discharging Physician / Practitioner: Oscar Gibbons PA-C  PCP: No primary care provider on file  Admission Date:   Admission Orders (From admission, onward)    Ordered        03/07/19 0014  Inpatient Admission  Once     Order ID Start Status   [id number] 03/07/19 0015 Completed              Discharge Date: 03/12/19    Resolved Problems  Date Reviewed: 3/12/2019          Resolved    Altered mental status 3/11/2019     Resolved by  Oscar Gibbons PA-C          Consultations During Hospital Stay:  · Cardiology  · Infectious Disease    Procedures Performed:   · Cardiac catheterization    Significant Findings / Test Results:   · Positive troponin with peak of 4 72   · Positive blood culture for MSSA  · Hepatitis-C antibody positive  · CTA of the chest showing showing a sub segmental branch left lower lobe pulmonary emboli    Test Results Pending at Discharge (will require follow up): · Hep C quantitative, thrombosis panel, HIV pending       Complications:  None    Reason for Admission:  Altered mental status    Hospital Course:     Be Davis is a 46 y o  male patient who originally presented to the hospital on 3/6/2019 due to altered mental status  He has a history of IV drug use    Upon evaluation he was found to have positive troponins as well as a CTA showing evidence of a pulmonary embolus  Blood cultures revealed MSSA  Infectious Disease and Cardiology was consulted  He received IV antibiotics and is currently on IV Cefazolin  He was initially started on a heparin drip which was transitioned to Xarelto for [facility] for his pulmonary embolism  He underwent an echo which showed us severe cardiomyopathy with an ejection fraction of 25%  He also underwent a COLEMAN which was negative for vegetations  He underwent a cardiac catheterization today which showed disease requiring a CABG  Cardiology has recommended transfer to the [facility]: Dr Jacques Peers discussed case with Dr Sandra Hodge  Patient to be transferred this evening  Please see above list of diagnoses and related plan for additional information  Patient being transferred to [facility] for further management - need for CABG  Discharge Day Visit / Exam:     * Please refer to separate progress note for these details *        Provisions for Follow-Up Care:  See after visit summary for information related to follow-up care and any pertinent home health orders  Disposition:     Transfer to [facility] regarding need for CABG       Discharge Statement:  I spent 45 minutes discharging the patient  This time was spent on the day of discharge  I had direct contact with the patient on the day of discharge  Greater than 50% of the total time was spent examining patient, answering all patient questions, arranging and discussing plan of care with patient as well as directly providing post-discharge instructions  Additional time then spent on discharge activities  Discharge Medications:  See after visit summary for reconciled discharge medications provided to patient and family        ** Please Note: This note has been constructed using a voice recognition system **

## 2019-03-12 NOTE — ASSESSMENT & PLAN NOTE
· As evidenced by SOB, CTA with subsegmental branch LLL PE  No current SOB and O2 sat normal on room air  · Patient denies any previous history of PE, denies any malignancy, long travel, recent surgeries  He is a smoker  · Follow up thrombophilia panel  Antithrombin III was low but checked after already starting Heparin  Will need follow up with Hem/onc as outpatient  · Continue Xarelto  · Continue telemetry monitoring

## 2019-03-12 NOTE — SOCIAL WORK
VICKY was informed by SLIM, that the pt would be transferred to [facility] for further medical treatment  The CM explained that a life vest has been requested and the results of the cardiac cath have been sent  VICKY spoke with Terry De La Rosa at [facility] and informed him of the possible transfer today or tomorrow  Terry De La Rosa stated that the pt could be fitted for the Vest tonight at [facility]  VICKY contacted Zuri Pascal to determine if [facility] has a bed  CM to f/u with Dayan Reno

## 2019-03-12 NOTE — ASSESSMENT & PLAN NOTE
· As evident by blood cultures positive for MSSA bacteremia in patient with history of IV drug abuse  · COLEMAN negative for vegetation/endocarditis  · Continue Cefazolin IV  · ID following  · Patient will need IV antibiotics through 3/22  He is now a candidate for PICC line as repeat blood cultures negative for 72 hours if needed

## 2019-03-12 NOTE — ASSESSMENT & PLAN NOTE
· As evidenced by SOB, CTA with subsegmental branch LLL PE  No current SOB and O2 sat normal on room air  · Patient denies any previous history of PE, denies any malignancy, long travel, recent surgeries  He is a smoker  · Follow up thrombophilia panel  Antithrombin III was low but checked after already starting Heparin  Will need follow up with Hem/onc as outpatient  · Continue Xarelto  Case management to verify coverage  · Continue telemetry monitoring

## 2019-03-12 NOTE — ASSESSMENT & PLAN NOTE
· NSTEMI type I vs  Type II TBD  Troponin peak of 4 72  · Cardiology following,  · Echo shows an EF of 25-30% with diffuse hypokinesis  · On Asa, Metoprolol, Lisinopril  · Denies any chest pain or shortness of breath at present  · LifeVest ordered  · He underwent a cardiac cath today  He requires transfer to [facility] for CABG  Dr Srinivasa Rose discussed case with Dr Theresa Elizondo  · He has been accepted for transfer to [facility]"" today

## 2019-03-13 ENCOUNTER — APPOINTMENT (INPATIENT)
Dept: NON INVASIVE DIAGNOSTICS | Facility: HOSPITAL | Age: 39
DRG: 190 | End: 2019-03-13
Payer: COMMERCIAL

## 2019-03-13 PROBLEM — I21.4 NSTEMI (NON-ST ELEVATED MYOCARDIAL INFARCTION) (HCC): Status: ACTIVE | Noted: 2019-03-07

## 2019-03-13 PROBLEM — R74.01 TRANSAMINITIS: Status: ACTIVE | Noted: 2019-03-13

## 2019-03-13 PROBLEM — F17.200 NICOTINE DEPENDENCE: Status: ACTIVE | Noted: 2019-03-13

## 2019-03-13 PROBLEM — I25.10 CAD (CORONARY ARTERY DISEASE): Status: ACTIVE | Noted: 2019-03-13

## 2019-03-13 PROBLEM — F19.10 POLYSUBSTANCE ABUSE (HCC): Status: ACTIVE | Noted: 2019-03-13

## 2019-03-13 PROBLEM — Z79.01 ON CONTINUOUS ORAL ANTICOAGULATION: Status: ACTIVE | Noted: 2019-03-13

## 2019-03-13 PROBLEM — I25.5 ISCHEMIC CARDIOMYOPATHY: Status: ACTIVE | Noted: 2019-03-13

## 2019-03-13 LAB
ABO GROUP BLD: NORMAL
ANION GAP SERPL CALCULATED.3IONS-SCNC: 5 MMOL/L (ref 4–13)
BACTERIA BLD CULT: NORMAL
BASOPHILS # BLD AUTO: 0.07 THOUSANDS/ΜL (ref 0–0.1)
BASOPHILS NFR BLD AUTO: 1 % (ref 0–1)
BILIRUB UR QL STRIP: NEGATIVE
BLD GP AB SCN SERPL QL: NEGATIVE
BUN SERPL-MCNC: 8 MG/DL (ref 5–25)
CALCIUM SERPL-MCNC: 8.5 MG/DL (ref 8.3–10.1)
CHLORIDE SERPL-SCNC: 107 MMOL/L (ref 100–108)
CHOLEST SERPL-MCNC: 252 MG/DL (ref 50–200)
CLARITY UR: CLEAR
CO2 SERPL-SCNC: 25 MMOL/L (ref 21–32)
COLOR UR: YELLOW
CREAT SERPL-MCNC: 0.65 MG/DL (ref 0.6–1.3)
EOSINOPHIL # BLD AUTO: 0.52 THOUSAND/ΜL (ref 0–0.61)
EOSINOPHIL NFR BLD AUTO: 6 % (ref 0–6)
ERYTHROCYTE [DISTWIDTH] IN BLOOD BY AUTOMATED COUNT: 13.8 % (ref 11.6–15.1)
EST. AVERAGE GLUCOSE BLD GHB EST-MCNC: 126 MG/DL
GFR SERPL CREATININE-BSD FRML MDRD: 123 ML/MIN/1.73SQ M
GLUCOSE SERPL-MCNC: 105 MG/DL (ref 65–140)
GLUCOSE UR STRIP-MCNC: NEGATIVE MG/DL
HBA1C MFR BLD: 6 % (ref 4.2–6.3)
HCT VFR BLD AUTO: 41.1 % (ref 36.5–49.3)
HDLC SERPL-MCNC: 29 MG/DL (ref 40–60)
HGB BLD-MCNC: 13.6 G/DL (ref 12–17)
HGB UR QL STRIP.AUTO: NEGATIVE
HIV 1+2 AB+HIV1 P24 AG SERPL QL IA: NORMAL
IMM GRANULOCYTES # BLD AUTO: 0.11 THOUSAND/UL (ref 0–0.2)
IMM GRANULOCYTES NFR BLD AUTO: 1 % (ref 0–2)
KETONES UR STRIP-MCNC: NEGATIVE MG/DL
LDLC SERPL CALC-MCNC: 179 MG/DL (ref 0–100)
LEUKOCYTE ESTERASE UR QL STRIP: NEGATIVE
LYMPHOCYTES # BLD AUTO: 2.34 THOUSANDS/ΜL (ref 0.6–4.47)
LYMPHOCYTES NFR BLD AUTO: 29 % (ref 14–44)
MAGNESIUM SERPL-MCNC: 2 MG/DL (ref 1.6–2.6)
MCH RBC QN AUTO: 30.6 PG (ref 26.8–34.3)
MCHC RBC AUTO-ENTMCNC: 33.1 G/DL (ref 31.4–37.4)
MCV RBC AUTO: 93 FL (ref 82–98)
MONOCYTES # BLD AUTO: 0.81 THOUSAND/ΜL (ref 0.17–1.22)
MONOCYTES NFR BLD AUTO: 10 % (ref 4–12)
NEUTROPHILS # BLD AUTO: 4.37 THOUSANDS/ΜL (ref 1.85–7.62)
NEUTS SEG NFR BLD AUTO: 53 % (ref 43–75)
NITRITE UR QL STRIP: NEGATIVE
NRBC BLD AUTO-RTO: 0 /100 WBCS
PH UR STRIP.AUTO: 7 [PH]
PLATELET # BLD AUTO: 207 THOUSANDS/UL (ref 149–390)
PMV BLD AUTO: 12.4 FL (ref 8.9–12.7)
POTASSIUM SERPL-SCNC: 3.8 MMOL/L (ref 3.5–5.3)
PROT UR STRIP-MCNC: NEGATIVE MG/DL
RBC # BLD AUTO: 4.44 MILLION/UL (ref 3.88–5.62)
RH BLD: POSITIVE
SODIUM SERPL-SCNC: 137 MMOL/L (ref 136–145)
SP GR UR STRIP.AUTO: 1.01 (ref 1–1.03)
SPECIMEN EXPIRATION DATE: NORMAL
T4 FREE SERPL-MCNC: 1.08 NG/DL (ref 0.76–1.46)
TRIGL SERPL-MCNC: 220 MG/DL
UROBILINOGEN UR QL STRIP.AUTO: 0.2 E.U./DL
WBC # BLD AUTO: 8.22 THOUSAND/UL (ref 4.31–10.16)

## 2019-03-13 PROCEDURE — 87081 CULTURE SCREEN ONLY: CPT | Performed by: PHYSICIAN ASSISTANT

## 2019-03-13 PROCEDURE — 93971 EXTREMITY STUDY: CPT

## 2019-03-13 PROCEDURE — 86850 RBC ANTIBODY SCREEN: CPT | Performed by: PHYSICIAN ASSISTANT

## 2019-03-13 PROCEDURE — 81003 URINALYSIS AUTO W/O SCOPE: CPT | Performed by: PHYSICIAN ASSISTANT

## 2019-03-13 PROCEDURE — 99255 IP/OBS CONSLTJ NEW/EST HI 80: CPT | Performed by: THORACIC SURGERY (CARDIOTHORACIC VASCULAR SURGERY)

## 2019-03-13 PROCEDURE — 84439 ASSAY OF FREE THYROXINE: CPT | Performed by: INTERNAL MEDICINE

## 2019-03-13 PROCEDURE — 80048 BASIC METABOLIC PNL TOTAL CA: CPT | Performed by: INTERNAL MEDICINE

## 2019-03-13 PROCEDURE — 93880 EXTRACRANIAL BILAT STUDY: CPT | Performed by: SURGERY

## 2019-03-13 PROCEDURE — 86901 BLOOD TYPING SEROLOGIC RH(D): CPT | Performed by: PHYSICIAN ASSISTANT

## 2019-03-13 PROCEDURE — 99223 1ST HOSP IP/OBS HIGH 75: CPT | Performed by: INTERNAL MEDICINE

## 2019-03-13 PROCEDURE — 86900 BLOOD TYPING SEROLOGIC ABO: CPT | Performed by: PHYSICIAN ASSISTANT

## 2019-03-13 PROCEDURE — 99232 SBSQ HOSP IP/OBS MODERATE 35: CPT | Performed by: INTERNAL MEDICINE

## 2019-03-13 PROCEDURE — 93880 EXTRACRANIAL BILAT STUDY: CPT

## 2019-03-13 PROCEDURE — 83735 ASSAY OF MAGNESIUM: CPT | Performed by: INTERNAL MEDICINE

## 2019-03-13 PROCEDURE — 93971 EXTREMITY STUDY: CPT | Performed by: SURGERY

## 2019-03-13 PROCEDURE — 83036 HEMOGLOBIN GLYCOSYLATED A1C: CPT | Performed by: PHYSICIAN ASSISTANT

## 2019-03-13 PROCEDURE — 80061 LIPID PANEL: CPT | Performed by: INTERNAL MEDICINE

## 2019-03-13 PROCEDURE — 85025 COMPLETE CBC W/AUTO DIFF WBC: CPT | Performed by: INTERNAL MEDICINE

## 2019-03-13 RX ORDER — METOPROLOL SUCCINATE 25 MG/1
25 TABLET, EXTENDED RELEASE ORAL DAILY
Status: DISCONTINUED | OUTPATIENT
Start: 2019-03-13 | End: 2019-03-13

## 2019-03-13 RX ORDER — ASPIRIN 81 MG/1
81 TABLET, CHEWABLE ORAL DAILY
Status: DISCONTINUED | OUTPATIENT
Start: 2019-03-13 | End: 2019-03-22 | Stop reason: HOSPADM

## 2019-03-13 RX ORDER — GUAIFENESIN 600 MG
600 TABLET, EXTENDED RELEASE 12 HR ORAL EVERY 12 HOURS SCHEDULED
Status: DISCONTINUED | OUTPATIENT
Start: 2019-03-13 | End: 2019-03-13

## 2019-03-13 RX ORDER — LISINOPRIL 2.5 MG/1
2.5 TABLET ORAL DAILY
Status: DISCONTINUED | OUTPATIENT
Start: 2019-03-13 | End: 2019-03-13

## 2019-03-13 RX ORDER — ATORVASTATIN CALCIUM 80 MG/1
80 TABLET, FILM COATED ORAL
Status: DISCONTINUED | OUTPATIENT
Start: 2019-03-13 | End: 2019-03-22 | Stop reason: HOSPADM

## 2019-03-13 RX ORDER — METOPROLOL SUCCINATE 25 MG/1
12.5 TABLET, EXTENDED RELEASE ORAL DAILY
Status: DISCONTINUED | OUTPATIENT
Start: 2019-03-14 | End: 2019-03-14

## 2019-03-13 RX ORDER — ATORVASTATIN CALCIUM 10 MG/1
10 TABLET, FILM COATED ORAL
Status: DISCONTINUED | OUTPATIENT
Start: 2019-03-13 | End: 2019-03-13

## 2019-03-13 RX ORDER — GUAIFENESIN 600 MG
600 TABLET, EXTENDED RELEASE 12 HR ORAL EVERY 12 HOURS PRN
Status: DISCONTINUED | OUTPATIENT
Start: 2019-03-13 | End: 2019-03-22 | Stop reason: HOSPADM

## 2019-03-13 RX ADMIN — ASPIRIN 81 MG 81 MG: 81 TABLET ORAL at 08:58

## 2019-03-13 RX ADMIN — RIVAROXABAN 15 MG: 15 TABLET, FILM COATED ORAL at 17:54

## 2019-03-13 RX ADMIN — RIVAROXABAN 15 MG: 15 TABLET, FILM COATED ORAL at 08:58

## 2019-03-13 RX ADMIN — CEFAZOLIN SODIUM 2000 MG: 10 INJECTION, POWDER, FOR SOLUTION INTRAVENOUS at 09:03

## 2019-03-13 RX ADMIN — NICOTINE 7 MG: 7 PATCH TRANSDERMAL at 09:01

## 2019-03-13 RX ADMIN — ATORVASTATIN CALCIUM 80 MG: 80 TABLET, FILM COATED ORAL at 17:54

## 2019-03-13 RX ADMIN — CEFAZOLIN SODIUM 2000 MG: 10 INJECTION, POWDER, FOR SOLUTION INTRAVENOUS at 02:30

## 2019-03-13 RX ADMIN — CEFAZOLIN SODIUM 2000 MG: 10 INJECTION, POWDER, FOR SOLUTION INTRAVENOUS at 17:55

## 2019-03-13 NOTE — UTILIZATION REVIEW
Initial Clinical Review  Admission: Date/Time/Statement: 3/13/2019 @ 0116  Orders Placed This Encounter   Procedures    Inpatient Admission     Standing Status:   Standing     Number of Occurrences:   1     Order Specific Question:   Admitting Physician     Answer:   Radha Host     Order Specific Question:   Level of Care     Answer:   Med Surg [16]     Order Specific Question:   Estimated length of stay     Answer:   More than 2 Midnights     Order Specific Question:   Certification     Answer:   I certify that inpatient services are medically necessary for this patient for a duration of greater than two midnights  See H&P and MD Progress Notes for additional information about the patient's course of treatment  Presented to the ED @ [facility], Admitted, Transferred to [facility] via EMS, higher level of care  03/12/2019 Heart Cath:SUMMARY CORONARY CIRCULATION:  Left main: Normal   Proximal LAD: There was a tubular 90 % stenosis  Mid LAD: There was a 100 % stenosis  This lesion is a chronic total occlusion  Distal LAD: Angiography showed moderate atherosclerosis  The distal vessel was supplied by limited collaterals  1st diagonal: The vessel was normal sized  Angiography showed minor luminal irregularities  Circumflex: The vessel was medium sized (co-dominant)  Angiography showed mild atherosclerosis  1st obtuse marginal: The vessel was normal sized  Angiography showed minor luminal irregularities  1st left posterolateral: The vessel was normal sized  Angiography showed minor luminal irregularities  Ramus intermedius: There was a discrete 80 % stenosis  Mid RCA: There was a tubular 90 % stenosis  Right PDA: The vessel was normal sized  Angiography showed mild atherosclerosis  RECOMMENDATIONS  Consultation with a cardiac surgeon will be obtained for surgical opinion and coronary artery bypass grafting    D/w Dr Kingsley   Chief Complaint: elevated TROP > Heart Cath > Multi Vessel Disease > CT Surgery Eval  Assessment/Plan: Eran Corona is a 46 y o  male with a past medical history significant for IV drug abuse who presented to [facility] on March 6th with altered mental status and sepsis  Initial differential of the patient's altered mental status at that time include seizure versus encephalopathy due to drugs  The patient was found to have an elevated troponin level which peaked at 4 72  Further evaluation revealed that the patient had a left lower lobe subsegmental pulmonary embolism for which she was started on a heparin drip  Also there was concern for aspiration pneumonia however Infectious Disease decided to not treat the patient at this time  However on March 6th blood cultures became positive for MSSA  Repeat blood cultures on March 8th were negative  The patient was started on IV cefazolin and will be treated until March 22nd  The patient's heparin drip for the pulmonary embolism was discontinued and he was transitioned to Xarelto  Further evaluation included an echocardiogram which revealed diffuse hypokinesis with ejection fraction of 25-30%  Note the patient has a family history significant for familial hypercholesterolemia  His father had a myocardial infarction at the age of 27 and his uncle had a myocardial infarction at the age of 34  A COLEMAN was performed which was negative for vegetation  On March 12, the patient underwent a cardiac catheterization which revealed a 90% occlusion of the proximal LAD, a 100% occlusion of the mid LAD, and a 90% occlusion of the mid RCA  The patient was [facility] on March 13th for a CT surgery evaluation with Dr Merrill Tripp  1  NSTEMI  · Patient presented to [facility] with altered mental status  Troponin peaked at 4 72  EKG reveals sinus tachycardia with occasional premature ventricular complexes    Cardiac catheterization on March 12th revealed a 90% occlusion of the proximal LAD, a 100% occlusion of the mid LAD, and 90% occlusion of the mid RCA  The patient has a family history significant for familial hypercholesterolemia in both his father and his uncle  The patient currently denies chest pain, shortness of breath, diaphoresis  § Monitor on telemetry  § Metoprolol succinate 25 mg daily  § Atorvastatin 80 mg daily  § Aspirin 81 mg daily  § Lisinopril 2 5 mg daily  § Check lipid panel  § Currently anticoagulated with Xarelto 50 mg 2 times daily  § CT surgery consult for possible CABG     2  Acute Subsegmental PE of LLL  · CT PE study at [facility] revealed a left lower lobe subsegmental pulmonary embolism  The patient was thus started on heparin drip  He was later transitioned to Xarelto  The patient currently denies chest pain, shortness of breath, syncope  The patient is currently saturating well on room air  § Xarelto 15 mg BID  § Continue to monitor clinically     3  MSSA Bacteremia   · The patient has a history of IV drug abuse however states that he has been abstinent since June of 2018  Urinary drug screen was positive for methamphetamine and opiates  Blood cultures on March 6th became positive for MSSA  Repeat blood cultures on March 8th were negative  Chad negative for vegetation  The patient was started on IV cefazolin per ID and will continue therapy until March 22nd  ID states that the patient is appropriate for PICC line placement  § Cefazolin 2000 mg IV q8 hours until 3/22/2019  § Day team to correlate with CT surgery in regards to PICC line placement  § Monitor fever curves and WBC count     4  Essential Hypertension  · Systolic blood pressures in the low 100s  § Lisinopril 2 5 mg daily   § Continue cardiopulmonary monitoring     5  Hyperlipidemia  · Patient with a family history of familial hypercholesterolemia in his father and his uncle  Patient appears to be on atorvastatin 10 mg at home    § Increase Atorvastatin to 80 mg daily  § Check lipid panel     6  Ischemic Cardiomyopathy with an EF of 25%  · Echocardiogram on March 7th revealed an ejection fraction of 25-30% with diffuse hypokinesis of the septum and inferior wall  Likely ischemic in nature secondary to NSTEMI  Physical exam unremarkable for signs of volume overload  The patient is saturating well on room air  § Continue medical management as above  § Monitor for signs of volume overload     VTE Pharmacologic Prophylaxis: Reason for no pharmacologic prophylaxis Xarelto   VTE Mechanical Prophylaxis: sequential compression device  Admission Status: INPATIENT     ED Vital Signs:   ED Triage Vitals   Temperature Pulse Respirations Blood Pressure SpO2   03/12/19 2245 03/12/19 2245 03/12/19 2245 03/12/19 2245 03/12/19 2245   98 6 °F (37 °C) (!) 52 20 102/59 99 %      Temp Source Heart Rate Source Patient Position - Orthostatic VS BP Location FiO2 (%)   03/12/19 2245 03/13/19 0301 03/12/19 2245 03/12/19 2245 --   Oral Monitor Sitting Left arm       Pain Score       03/12/19 2245       No Pain        Wt Readings from Last 1 Encounters:   03/13/19 98 6 kg (217 lb 4 8 oz)     Pertinent Labs/Diagnostic Test Results:     Past Medical/Surgical History:    Active Ambulatory Problems     Diagnosis Date Noted    NSTEMI (non-ST elevated myocardial infarction) ([address] ) 03/07/2019    Closed traumatic nondisplaced fracture of one rib of right side 03/07/2019    Acute subsegmental PE of LLE 03/07/2019    Tobacco abuse 03/07/2019    Bacteremia due to Staphylococcus aureus (MSSA) 03/09/2019    Hepatitis C 03/11/2019    TSH elevation 03/12/2019     Resolved Ambulatory Problems     Diagnosis Date Noted    Altered mental status 03/07/2019     Past Medical History:   Diagnosis Date    CAD (coronary artery disease)     Drug use     Former tobacco use     Hepatitis C     Hyperlipidemia     Hypertension     Pulmonary embolus ([address] )     Rib fracture      Admitting Diagnosis: NSTEMI (non-ST elevated myocardial infarction) ([facility] ) [I21 4]  Cardiomyopathy ([place] [address] ) [I42 9]  Age/Sex: 46 y o  male  Admission Orders:  Scheduled Meds:   Current Facility-Administered Medications:  aspirin 81 mg Oral Daily Aggie Rai MD    atorvastatin 80 mg Oral Daily With García Sim MD    cefazolin 2,000 mg Intravenous Hortensia Thayer MD Last Rate: 2,000 mg (03/13/19 0903)   guaiFENesin 600 mg Oral Q12H PRN Kassandra Ann MD    lisinopril 2 5 mg Oral Daily Kassandra Ann MD    metoprolol succinate 25 mg Oral Daily Cecilia Tinoco DO    nicotine 7 mg Transdermal Daily Aggie Rai MD    rivaroxaban 15 mg Oral BID With Meals Aggie Rai MD    Cardiac diet  Up with assistance / Activity as tolerated / Ambulate q8h  TELM  Carotids: pending  Lower limb vein mapping: pending  Consult Heart Failure  Consult ID  ----------------------------------------------------------------------------------------------------------------------------------------  Consult CT Surgery  Understand and wish to proceed with further workup and ultimately surgical intervention  Have ordered routine preoperative laboratory and vascular studies  Pending the results of these tests, will be scheduled for surgery with TINO Campos  Network Utilization Review Department  Phone: [phone]; Fax [phone]  [email or web address]  ATTENTION: Please call with any questions or concerns to [phone]  and carefully listen to the prompts so that you are directed to the right person  Send all requests for admission clinical reviews, approved or denied determinations and any other requests to fax [phone]   All voicemails are confidential

## 2019-03-13 NOTE — PROGRESS NOTES
General Cardiology   Progress Note -  [employer]   Xochilt Miramontes 46 y o  male MRN: [id number]    Unit/Bed#: [address] 412-01 Encounter: [id number]    Assessment/ Plan    NSTEMI/Multivessel CAD  Presented to [facility] with AMS due to illicit drug use, troponin peaked at 4 72  Cardiac catheterization 3/12/19: Multivessel CAD-  90% prox LAD, 100% mid LAD (), 80% ramus intermedius and 90% mid RCA  Family history of early onset CAD- father and uncle both had MI around age 33/28  Transferred to [facility] for CT surgery evaluation  Continue ASA, statin, Toprol XL 25 mg daily    New cardiomyopathy, EF 25-30%  Likely ischemic considering multivessel CAD, however has hx of drug use as well  Echo 3/7/19: LV mildly dilated, EF 25-30%, diffuse hypokinesis with severe hypokinesis of septum and inferior wall  RV mildly dilated, systolic function mildly reduced with estimated peak pressure of at least 30 mmHg  LA mild to moderately dilated, and RA dilated  On ASA, statin, Toprol XL 25 mg daily, and lisinopril 2 5 mg daily    Acute PE   Initially treated with heparin infusion, now on Xarelto for anticoagulation  MSSA bacteremia  1 set of MSSA + blood cx on 3/6/19  Repeat blood cx 3/8/19- no growth x 4 days  IV cefazolin through 3/22 per infectious disease    Dyslipidemia  TC- 252, trig 220, HDL 29,   Continue high intensity statin- atorvastatin 80 mg daily    Subjective  Review of Systems   Constitution: Negative for chills, fever, malaise/fatigue and weight gain  Cardiovascular: Positive for dyspnea on exertion  Negative for chest pain, irregular heartbeat, leg swelling, near-syncope, orthopnea, palpitations and syncope  Respiratory: Negative for cough, shortness of breath, sleep disturbances due to breathing and sputum production  Gastrointestinal: Negative for bloating, abdominal pain, nausea and vomiting  Neurological: Negative for dizziness, light-headedness and weakness       Objective:   Vitals: Blood pressure 104/61, pulse (!) 52, temperature 97 9 °F (36 6 °C), temperature source Oral, resp  rate 18, weight 98 6 kg (217 lb 4 8 oz), SpO2 98 %  ,     Body mass index is 29 47 kg/m²  ,     Systolic (18QAR), SPF:333 , Min:96 , VSP:711     Diastolic (06TME), VVV:46, Min:52, Max:85      Intake/Output Summary (Last 24 hours) at 3/13/2019 0825  Last data filed at 3/13/2019 0700  Gross per 24 hour   Intake 120 ml   Output    Net 120 ml     Weight (last 2 days)     Date/Time   Weight    03/13/19 0600   98 6 (217 3)    03/13/19 0503   98 6 (217 3)            Telemetry Review: No significant arrhythmias seen on telemetry review  Sinus bradycardia  EKG personally reviewed by JOE Oquendo  3/6/19- sinus tachycardia with PVCs    Physical Exam   Constitutional: Vital signs are normal  No distress  Neck: Neck supple  No hepatojugular reflux and no JVD present  Carotid bruit is not present  Cardiovascular: Regular rhythm and intact distal pulses  Bradycardia present  Exam reveals no gallop and no friction rub  Murmur (systolic) heard  Pulmonary/Chest: Effort normal and breath sounds normal  No respiratory distress  He has no wheezes  He has no rales  Abdominal: Soft  Bowel sounds are normal  He exhibits no distension  There is no tenderness  Musculoskeletal: He exhibits no edema or tenderness  Skin: Skin is warm  No rash noted  No erythema  Psychiatric: He has a normal mood and affect       LABORATORY RESULTS  Results from last 7 days   Lab Units 03/07/19  0944 03/07/19  0634 03/07/19  0151 03/06/19  2141   CK TOTAL U/L  --   --   --  1,281*   TROPONIN I ng/mL 4 68* 4 72* 2 48* 0 12*   CK MB INDEX %  --   --   --  <1 0     CBC with diff:   Results from last 7 days   Lab Units 03/13/19  0530 03/12/19  0503 03/11/19  0512 03/10/19  0534 03/09/19  0505 03/08/19  0634 03/07/19  0634  03/06/19  2141   WBC Thousand/uL 8 22 8 02 6 66 5 63 5 24 6 12 14 75*  --  20 20*   HEMOGLOBIN g/dL 13 6 13 7 13 9 13 1 13 0 12 3 12  8  --  13 8   I STAT HEMOGLOBIN   --   --   --   --   --   --   --    < >  --    HEMATOCRIT % 41 1 42 1 42 7 40 4 40 0 37 2 38 9  --  40 8   HEMATOCRIT, ISTAT   --   --   --   --   --   --   --    < >  --    MCV fL 93 92 92 92 93 90 91  --  90   PLATELETS Thousands/uL 207 219 204 200 185 180 200  --  241   MCH pg 30 6 29 8 30 0 29 7 30 1 29 7 29 9  --  30 3   MCHC g/dL 33 1 32 5 32 6 32 4 32 5 33 1 32 9  --  33 8   RDW % 13 8 13 9 14 0 13 8 13 9 13 3 13 2  --  13 3   MPV fL 12 4 11 5 11 5 11 3 11 6 11 7 11 5  --  11 2   NRBC AUTO /100 WBCs 0 0  --   --   --   --  0  --  0    < > = values in this interval not displayed  CMP:  Results from last 7 days   Lab Units 03/13/19  0530 03/12/19  0503 03/11/19  6229 03/10/19  0534 03/09/19  0505 03/08/19  7426 03/07/19  7640 03/06/19  2148 03/06/19  2146  03/06/19  2141   POTASSIUM mmol/L 3 8 4 3 4 0 3 7 4 3 3 8 3 7  --   --   --  3 5   CHLORIDE mmol/L 107 106 105 104 105 102 99*  --   --   --  98*   CO2 mmol/L 25 25 24 26 26 26 25  --   --   --  25   CO2, I-STAT mmol/L  --   --   --   --   --   --   --  27 26   < >  --    BUN mg/dL 8 10 8 5 6 2* 10  --   --   --  13   CREATININE mg/dL 0 65 0 69 0 63 0 65 0 59* 0 66 0 91  --   --   --  1 29   GLUCOSE, ISTAT mg/dl  --   --   --   --   --   --   --  91 93  --   --    CALCIUM mg/dL 8 5 8 6 8 6 8 8 8 5 8 6 8 3  --   --   --  8 8   AST U/L  --  82*  --   --   --   --   --   --   --   --  108*   ALT U/L  --  124*  --   --   --   --   --   --   --   --  119*   ALK PHOS U/L  --  64  --   --   --   --   --   --   --   --  82   EGFR ml/min/1 73sq m 123 120 125 123 128 123 107  --  85  --  70    < > = values in this interval not displayed       BMP:  Results from last 7 days   Lab Units 03/13/19  0530 03/12/19  0503 03/11/19  0049 03/10/19  0534 03/09/19  0505 03/08/19  5826 03/07/19  0634 03/06/19  2148   POTASSIUM mmol/L 3 8 4 3 4 0 3 7 4 3 3 8 3 7  --    CHLORIDE mmol/L 107 106 105 104 105 102 99*  --    CO2 mmol/L 25 25 24 26 26 26 25  --    CO2, I-STAT mmol/L  --   --   --   --   --   --   --  27   BUN mg/dL 8 10 8 5 6 2* 10  --    CREATININE mg/dL 0 65 0 69 0 63 0 65 0 59* 0 66 0 91  --    GLUCOSE, ISTAT mg/dl  --   --   --   --   --   --   --  91   CALCIUM mg/dL 8 5 8 6 8 6 8 8 8 5 8 6 8 3  --      Lab Results   Component Value Date    NTBNP 1,425 (H) 2019      Results from last 7 days   Lab Units 19  0530 19  0503 19  2141   MAGNESIUM mg/dL 2 0 1 9 1 8      Results from last 7 days   Lab Units 19  0437 19  2141   TSH 3RD GENERATON uIU/mL  --  8 323*   FREE T4 ng/dL 1 08  --      Results from last 7 days   Lab Units 19  0513 19  2141   INR  1 23* 1 15     Lipid Profile:   No results found for: CHOL  Lab Results   Component Value Date    HDL 29 (L) 2019     Lab Results   Component Value Date    LDLCALC 179 (H) 2019     Lab Results   Component Value Date    TRIG 220 (H) 2019     Cardiac testing:   Results for orders placed during the hospital encounter of 19   Echo complete with contrast if indicated    Narrative [address] [place], [place] [id number] [phone]    Transthoracic Echocardiogram  2D, M-mode, Doppler, and Color Doppler    Study date:  07-Mar-2019    Patient: Karely Butler  MR number: [id number]  Account number: [de-identified]  : 1980  Age: 46 years  Gender: Male  Status: Inpatient  Location: Emergency room  Height: 72 in  Weight: 231 lb  BP: 134/ 63 mmHg    Indications: Pulmonary embolism    Diagnoses: I26 99 - Other pulmonary embolism without acute cor pulmonale    Sonographer:  Tae Tee  Referring Physician:  Johnny Disla PA-C  Group:  [facility]  Interpreting Physician:  Erika Paredes MD    SUMMARY    LEFT VENTRICLE:  The ventricle was mildly dilated  Ejection fraction was estimated to be 25 % to 30%    There was diffuse hypokinesis with svere hypkinesis of the septum and inferior wall  The septum is thinned out  RIGHT VENTRICLE:  The ventricle was mildly dilated  Systolic function was mildly reduced  Estimated peak pressure was at least 30 mmHg  LEFT ATRIUM:  The atrium was mildly to moderately dilated  RIGHT ATRIUM:  The atrium was dilated  TRICUSPID VALVE:  There was trace regurgitation  HISTORY: PRIOR HISTORY: Risk factors: elevated troponin, hypercholesterolemia and a history of current cigarette use (within the last month)  PROCEDURE: The procedure was performed in the emergency room  This was a routine study  The transthoracic approach was used  The study included complete 2D imaging, M-mode, complete spectral Doppler, and color Doppler  The heart rate was  87 bpm, at the start of the study  Images were obtained from the parasternal, apical, subcostal, and suprasternal notch acoustic windows  Echocardiographic views were limited due to lung interference  Image quality was adequate  LEFT VENTRICLE: The ventricle was mildly dilated  Ejection fraction was estimated to be 25 % to 30%  There was diffuse hypokinesis with svere hypkinesis of the septum and inferior wall  The septum is thinned out  RIGHT VENTRICLE: The ventricle was mildly dilated  Systolic function was mildly reduced  Wall thickness was normal  DOPPLER: Estimated peak pressure was at least 30 mmHg  LEFT ATRIUM: The atrium was mildly to moderately dilated  RIGHT ATRIUM: The atrium was dilated  MITRAL VALVE: Valve structure was normal  There was normal leaflet separation  DOPPLER: The transmitral velocity was within the normal range  There was no evidence for stenosis  There was no regurgitation  AORTIC VALVE: The valve was not well visualized  DOPPLER: There was no evidence for stenosis  TRICUSPID VALVE: The valve structure was normal  There was normal leaflet separation  DOPPLER: The transtricuspid velocity was within the normal range   There was no evidence for stenosis  There was trace regurgitation  PULMONIC VALVE: Leaflets exhibited normal thickness, no calcification, and normal cuspal separation  DOPPLER: The transpulmonic velocity was within the normal range  There was no regurgitation  PERICARDIUM: There was no pericardial effusion  The pericardium was normal in appearance  AORTA: The root exhibited normal size      SYSTEM MEASUREMENT TABLES    2D  %FS: 24 2 %  Ao Diam: 2 5 cm  EDV(Teich): 146 1 ml  EF(Teich): 47 6 %  ESV(Teich): 76 5 ml  IVSd: 1 cm  LA Area: 21 5 cm2  LA Diam: 4 4 cm  LVEDV MOD A4C: 202 1 ml  LVEF MOD A4C: 48 9 %  LVESV MOD A4C: 103 3 ml  LVIDd: 5 5 cm  LVIDs: 4 2 cm  LVLd A4C: 10 8 cm  LVLs A4C: 9 6 cm  LVPWd: 1 cm  RA Area: 22 cm2  RVIDd: 4 7 cm  SV MOD A4C: 98 8 ml  SV(Teich): 69 6 ml    CW  TR Vmax: 2 8 m/s  TR maxP 6 mmHg    MM  TAPSE: 2 1 cm    PW  E': 0 1 m/s  E/E': 8 9  MV A Reese: 0 7 m/s  MV Dec [place]: 2 9 m/s2  MV DecT: 271 7 ms  MV E Reese: 0 8 m/s  MV E/A Ratio: 1 1  MV PHT: 78 8 ms  MVA By PHT: 2 8 cm2    [employer] Accredited Echocardiography Laboratory    Prepared and electronically signed by    Aman Lenz MD  Signed 07-Mar-2019 17:25:06       Results for orders placed during the hospital encounter of 19   [facility]    Narrative [address] [place] [address]  [phone]    Transesophageal Echocardiogram  Limited 2D, Doppler, and Color Doppler    Study date:  11-Mar-2019    Patient: Josselyn Can  MR number: [id number]  Account number: [de-identified]  : 1980  Age: 46 years  Gender: Male  Status: Inpatient  Location: Cath lab  Height: 72 in  Weight: 224 lb  BP: 118/ 69 mmHg    Indications: Heart failure    Diagnoses: I50 9 - Heart failure, unspecified    Sonographer:  Robbi Guzman RDCS  Referring Physician:  Zoya Harrison MD  Group:  [facility]  RN:  Dean Bryant  Interpreting Physician:  Shailesh Zamudio  Mervat Brooks MD    SUMMARY    LEFT VENTRICLE:  Systolic function was mildly to moderately reduced  MITRAL VALVE:  There was mild regurgitation  There was no echocardiographic evidence of vegetation  AORTIC VALVE:  There was no echocardiographic evidence of vegetation  TRICUSPID VALVE:  There was trace regurgitation  There was no echocardiographic evidence of vegetation  AORTA:  There was mild atheroma in the mid descending aorta  HISTORY: PRIOR HISTORY: Risk factors: altered mental status, pulmonary embolism, elevated troponin, bacteremia, hypercholesterolemia, a history of current cigarette use (within the last month), and a family history of coronary artery  disease  PROCEDURE: The procedure was performed in the catheterization laboratory  This was a routine study  The risks and alternatives of the procedure were explained to the patient and informed consent was obtained  The transesophageal approach  was used  The study included limited 2D imaging, limited spectral Doppler, and color Doppler  The heart rate was 75 bpm, at the start of the study  An adult omniplane probe was inserted by the attending cardiologist  Intubated with ease  One intubation attempt(s)  There was no blood detected on the probe  Image quality was adequate  There were no complications during the procedure  LEFT VENTRICLE: Size was normal  Systolic function was mildly to moderately reduced  Wall thickness was normal     RIGHT VENTRICLE: The size was normal  Systolic function was normal  Wall thickness was normal     LEFT ATRIUM: Size was normal  No thrombus was identified  RIGHT ATRIUM: Size was normal  No thrombus was identified  MITRAL VALVE: Valve structure was normal  There was normal leaflet separation  There was no echocardiographic evidence of vegetation  DOPPLER: There was mild regurgitation  AORTIC VALVE: The valve was trileaflet  Leaflets exhibited normal thickness and normal cuspal separation   There was no echocardiographic evidence of vegetation  DOPPLER: There was no regurgitation  TRICUSPID VALVE: The valve structure was normal  There was normal leaflet separation  There was no echocardiographic evidence of vegetation  DOPPLER: There was trace regurgitation  PULMONIC VALVE: Leaflets exhibited normal thickness, no calcification, and normal cuspal separation  There was no echocardiographic evidence of vegetation  PERICARDIUM: There was no pericardial effusion  The pericardium was normal in appearance  AORTA: The root exhibited normal size  There was no atheroma  There was mild atheroma in the mid descending aorta  There was no evidence for dissection  There was no evidence for aneurysm  [address] Accredited Echocardiography Laboratory    Prepared and electronically signed by    Jad Figueroa MD  Signed 11-Mar-2019 18:02:02       Meds/Allergies   all current active meds have been reviewed and current meds:   Current Facility-Administered Medications   Medication Dose Route Frequency    aspirin chewable tablet 81 mg  81 mg Oral Daily    atorvastatin (LIPITOR) tablet 80 mg  80 mg Oral Daily With Dinner    ceFAZolin (ANCEF) 2,000 mg in sodium chloride 0 9 % 50 mL IVPB  2,000 mg Intravenous Q8H    guaiFENesin (MUCINEX) 12 hr tablet 600 mg  600 mg Oral Q12H PRN    lisinopril (ZESTRIL) tablet 2 5 mg  2 5 mg Oral Daily    metoprolol succinate (TOPROL-XL) 24 hr tablet 25 mg  25 mg Oral Daily    nicotine (NICODERM CQ) 7 mg/24hr TD 24 hr patch 7 mg  7 mg Transdermal Daily    rivaroxaban (XARELTO) tablet 15 mg  15 mg Oral BID With Meals     Medications Prior to Admission   Medication    rivaroxaban (XARELTO) 10 mg tablet     Counseling / Coordination of Care  Total floor / unit time spent today 20 minutes  Greater than 50% of total time was spent with the patient and / or family counseling and / or coordination of care        ** Please Note: Dragon 360 Dictation voice to text software may have been used in the creation of this document   **

## 2019-03-13 NOTE — SOCIAL WORK
44yo male transferred from [facility] last night s/p NSTEMI  Cardiac cath +CAD  Plan CABG, date unknown  Pt  Has hx IVDA and has been clean x6 mos  Previously had rehab 3 years ago at [facility]  Discussed HOST program and pt is interested in HOST, referral made  He is independent with ADLs, alert, unemployed, drives  He is currently residing in [facility] with his girlfriend's parents  She is currently in long term drug rehab  They live in basement apt there with 12 NATAN  However as he will be unable to drive postop, agrees to postop care at his mother's home in [facility]  She is [facility], [address] [place] [address]  Her phone is [phone]  She lives in 2 story home with 2 NATAN, bathroom each floor  He uses no DME, no hx HHC, no physical rehab  Discussed need for [address] after surgery, [facility]  Referral sent  Pt  Also has staph bacteremia and is on IV abx until 3/22/19  CM following  Has not seen PCP in a long time but has used [facility]  Plan Dr Vashti Murphy  [facility] [address] [facility]      CM reviewed d/c planning process including the following: identifying help at home, patient preference for d/c planning needs, Discharge Lounge, [facility] Meds to Bed program, availability of treatment team to discuss questions or concerns patient and/or family may have regarding understanding medications and recognizing signs and symptoms once discharged  CM also encouraged patient to follow up with all recommended appointments after discharge  Patient advised of importance for patient and family to participate in managing patients medical well being  Patient/caregiver received discharge checklist  Content reviewed  Patient/caregiver encouraged to participate in discharge plan of care prior to discharge home

## 2019-03-13 NOTE — CONSULTS
Consultation - Infectious Disease   Violetta Muñoz 46 y o  male MRN: [id number]  Unit/Bed#: [facility] 412-01 Encounter: [id number]      IMPRESSION & RECOMMENDATIONS:   1  Sepsis-POA   Leukocytosis and tachycardia on admission at [facility] to Staph bacteremia  No other clear sources present on evaluation   The patient remains hemodynamically stable and nontoxic despite his initial systemic illness   Patient currently tolerating antibiotics  -continue cefazolin through 3/22 to complete 2 weeks   -monitor CBC with diff and creatinine  -supportive care     2  MSSA bacteremia:  Suspect this is related to the patient's polysubstance abuse along with multiple scabs on his skin   Although only 1 set is positive, consider to be clinically relevant given his acute status on presentation   Consideration for the possibility of endocarditis  Transthoracic and transesophageal echocardiograms are both negative for valvular vegetations  As this was a low-grade, transient bacteremia at worst, very unlikely that this represents an endovascular infection  -antibiotics as above  -plan for 2 weeks of antibiotics as above  -no contraindication to placing a PICC at this time if the patient needs a PICC line      3  Injection drug abuse-patient previously denying he is currently using, however the current bacteremia is suspicious for ongoing drug use   Patient's U tox was noted to be positive on admission  Complicated by hepatitis-C   HIV screen negative  -recommend host evaluation  -the patient is not a candidate for home intravenous antibiotics      4  Hepatitis C-in the setting of injection drug use  Liver function tests remain abnormal  -follow-up hep C RNA to confirm diagnosis  -monitor liver function test     5   Abnormal CT of the chest-the ground-glass changes are not really consistent with an acute bacterial pneumonia   Possibly a transient aspiration pneumonitis   Possibly this is a toxin event to the lungs in the setting of active drug use  The patient's respiratory status has improved  -no need for antibiotics for this issue  -monitor respiratory status     6  Pulmonary embolism-anticoagulation as per the primary service     7  Cardiomyopathy-unclear etiology  Potentially related to patient's drug use  Cardiac catheterization with significant disease   -ongoing evaluation by CT surgery  -plan for possible CABG on this admission    Above plan discussed in detail with the patient  Will update primary service of the above  ID consult service will continue to follow  HISTORY OF PRESENT ILLNESS:  Reason for Consult: MSSA bacteremia     HPI: Ebony Client is a 46y o  year old male with history of IV drug use who initially presented at [facility] [place] with confusion and shortness of breath  Patient initially reported taking some illicit substance and feeling confused after  He subsequently developed cough and shortness of breath  Patient in the ER was noted to be encephalopathic, afebrile, with leukocytosis and tachycardic  Blood cultures were collected and he was initially started on antibiotics for pneumonia given the ground-glass changes seen on his lung imaging  The patient clinically improved quickly  He was evaluated by the ID service at [facility]  Patient's blood cultures were later found to be positive with Staph and so he was initially started on vancomycin  Testing was done for HIV and hepatitis  Cultures were later significant for MSSA bacteremia  Transthoracic echo was unremarkable  Transesophageal echo was ordered  Transesophageal echo was later noted to be negative  His bacteremia was overall felt to be transient and he was plan to complete a 2 week course of antibiotics  Patient was ultimately transferred here to [place] for additional cardiothoracic evaluation  Patient underwent cardiac catheterization and was found to have significant occlusions of his major vessels  Patient went on to have additional vascular studies today  He is also being followed by Cardiology  Patient is currently afebrile and without leukocytosis  He remains on Ancef  Repeat cultures remain without growth  No new images since 3/6  Patient's other vitals are stable  We are consulted at this time for further assistance in management  On evaluation, patient is sitting up in bed  He currently denies having any nausea, vomiting, chest pain or shortness of breath  He reports that he tolerated vascular procedures without any issue  Denies developing any rash or diarrhea while on antibiotics  The patient's friend is at bedside and so we did not discuss further into his history of drug use for reason for presentation  He does denote having some scrapes and scratches all over his skin which could have been sources of entry for his staph infection  REVIEW OF SYSTEMS:  A complete 12 point system-based review of systems is negative other than that noted in the HPI      PAST MEDICAL HISTORY:  Past Medical History:   Diagnosis Date    CAD (coronary artery disease)     Drug use     heroin, amphetamines, opiates    Former tobacco use     Hepatitis C     Hyperlipidemia     Hypertension     Pulmonary embolus ([address] )     Rib fracture     right lateral 7th rib     Past Surgical History:   Procedure Laterality Date    CARDIAC CATHETERIZATION         FAMILY HISTORY:  Non-contributory    SOCIAL HISTORY:  Social History   Social History     Substance and Sexual Activity   Alcohol Use Yes    Alcohol/week: 1 8 oz    Types: 3 Cans of beer per week    Frequency: 2-4 times a month    Drinks per session: 1 or 2    Binge frequency: Less than monthly     Social History     Substance and Sexual Activity   Drug Use Not Currently    Types: Heroin    Comment: 6 months      Social History     Tobacco Use   Smoking Status Current Every Day Smoker    Packs/day: 0 25    Types: Cigarettes   Smokeless Tobacco Never Used       ALLERGIES:  No Known Allergies    MEDICATIONS:  All current active medications have been reviewed  PHYSICAL EXAM:  Temp:  [97 3 °F (36 3 °C)-98 6 °F (37 °C)] 97 9 °F (36 6 °C)  HR:  [47-74] 52  Resp:  [18-20] 18  BP: ()/(52-85) 104/61  SpO2:  [95 %-99 %] 98 %  Temp (24hrs), Av °F (36 7 °C), Min:97 3 °F (36 3 °C), Max:98 6 °F (37 °C)  Current: Temperature: 97 9 °F (36 6 °C)    Intake/Output Summary (Last 24 hours) at 3/13/2019 1102  Last data filed at 3/13/2019 0903  Gross per 24 hour   Intake 170 ml   Output    Net 170 ml       General Appearance:  Appearing well, nontoxic, and in no distress   Head:  Normocephalic, without obvious abnormality, atraumatic   Eyes:  Conjunctiva pink and sclera anicteric, both eyes   Nose: Nares normal, mucosa normal, no drainage   Throat: Oropharynx moist without lesions   Neck: Supple, symmetrical, no adenopathy, no tenderness/mass/nodules   Back:   Symmetric, no curvature, ROM normal, no CVA tenderness; no spinal or paraspinal muscle tenderness to palpation  Lungs:   Clear to auscultation bilaterally, respirations unlabored   Chest Wall:  No tenderness or deformity   Heart:  RRR; no rub or gallop; systolic murmur present   Abdomen:   Soft, non-tender, non-distended, positive bowel sounds    Extremities: No cyanosis, clubbing or edema   Skin: No new rashes or lesions  No new draining wounds noted  Patient shows me the very small cuts and scrapes on his skin including in his scalp  Lymph nodes: Cervical, supraclavicular nodes normal   Neurologic: Alert and oriented times 3, extremity strength 5/5 and symmetric       LABS, IMAGING, & OTHER STUDIES:  Lab Results:  I have personally reviewed pertinent labs    Results from last 7 days   Lab Units 1930 19  0503 19  0512   WBC Thousand/uL 8 22 8 02 6 66   HEMOGLOBIN g/dL 13 6 13 7 13 9   PLATELETS Thousands/uL 207 219 204     Results from last 7 days   Lab Units 19  0530 03/12/19  0503  03/06/19  2148  03/06/19  2141   POTASSIUM mmol/L 3 8 4 3   < >  --   --  3 5   CHLORIDE mmol/L 107 106   < >  --   --  98*   CO2 mmol/L 25 25   < >  --   --  25   CO2, I-STAT mmol/L  --   --   --  27   < >  --    BUN mg/dL 8 10   < >  --   --  13   CREATININE mg/dL 0 65 0 69   < >  --   --  1 29   EGFR ml/min/1 73sq m 123 120   < >  --    < > 70   GLUCOSE, ISTAT mg/dl  --   --   --  91   < >  --    CALCIUM mg/dL 8 5 8 6   < >  --   --  8 8   AST U/L  --  82*  --   --   --  108*   ALT U/L  --  124*  --   --   --  119*   ALK PHOS U/L  --  64  --   --   --  82    < > = values in this interval not displayed  Results from last 7 days   Lab Units 03/08/19  1649 03/08/19  1641 03/07/19  1809 03/06/19  2338   BLOOD CULTURE  No Growth After 4 Days  No Growth After 4 Days  --  No Growth After 5 Days  Staphylococcus aureus*   GRAM STAIN RESULT   --   --   --  Gram positive cocci in clusters*   LEGIONELLA URINARY ANTIGEN   --   --  Negative  --        Imaging Studies:   I have personally reviewed pertinent imaging study reports and images in PACS  Other Studies:   I have personally reviewed pertinent reports

## 2019-03-13 NOTE — PLAN OF CARE
Problem: PAIN - ADULT  Goal: Verbalizes/displays adequate comfort level or baseline comfort level  Description  Interventions:  - Encourage patient to monitor pain and request assistance  - Assess pain using appropriate pain scale  - Administer analgesics based on type and severity of pain and evaluate response  - Implement non-pharmacological measures as appropriate and evaluate response  - Consider cultural and social influences on pain and pain management  - Notify physician/advanced practitioner if interventions unsuccessful or patient reports new pain  Outcome: Progressing     Problem: INFECTION - ADULT  Goal: Absence or prevention of progression during hospitalization  Description  INTERVENTIONS:  - Assess and monitor for signs and symptoms of infection  - Monitor lab/diagnostic results  - Monitor all insertion sites, i e  indwelling lines, tubes, and drains  - Monitor endotracheal (as able) and nasal secretions for changes in amount and color  - [place] appropriate cooling/warming therapies per order  - Administer medications as ordered  - Instruct and encourage patient and family to use good hand hygiene technique  - Identify and instruct in appropriate isolation precautions for identified infection/condition  Outcome: Progressing  Goal: Absence of fever/infection during neutropenic period  Description  INTERVENTIONS:  - Monitor WBC  - Implement neutropenic guidelines  Outcome: Progressing     Problem: SAFETY ADULT  Goal: Patient will remain free of falls  Description  INTERVENTIONS:  - Assess patient frequently for physical needs  -  Identify cognitive and physical deficits and behaviors that affect risk of falls    -  [place] fall precautions as indicated by assessment   - Educate patient/family on patient safety including physical limitations  - Instruct patient to call for assistance with activity based on assessment  - Modify environment to reduce risk of injury  - Consider OT/PT consult to assist with strengthening/mobility  Outcome: Progressing  Goal: Maintain or return to baseline ADL function  Description  INTERVENTIONS:  -  Assess patient's ability to carry out ADLs; assess patient's baseline for ADL function and identify physical deficits which impact ability to perform ADLs (bathing, care of mouth/teeth, toileting, grooming, dressing, etc )  - Assess/evaluate cause of self-care deficits   - Assess range of motion  - Assess patient's mobility; develop plan if impaired  - Assess patient's need for assistive devices and provide as appropriate  - Encourage maximum independence but intervene and supervise when necessary  ¯ Involve family in performance of ADLs  ¯ Assess for home care needs following discharge   ¯ Request OT consult to assist with ADL evaluation and planning for discharge  ¯ Provide patient education as appropriate  Outcome: Progressing  Goal: Maintain or return mobility status to optimal level  Description  INTERVENTIONS:  - Assess patient's baseline mobility status (ambulation, transfers, stairs, etc )    - Identify cognitive and physical deficits and behaviors that affect mobility  - Identify mobility aids required to assist with transfers and/or ambulation (gait belt, sit-to-stand, lift, walker, cane, etc )  - [place] fall precautions as indicated by assessment  - Record patient progress and toleration of activity level on Mobility SBAR; progress patient to next Phase/Stage  - Instruct patient to call for assistance with activity based on assessment  - Request Rehabilitation consult to assist with strengthening/weightbearing, etc   Outcome: Progressing     Problem: DISCHARGE PLANNING  Goal: Discharge to home or other facility with appropriate resources  Description  INTERVENTIONS:  - Identify barriers to discharge w/patient and caregiver  - Arrange for needed discharge resources and transportation as appropriate  - Identify discharge learning needs (meds, wound care, etc )  - Arrange for interpretive services to assist at discharge as needed  - Refer to Case Management Department for coordinating discharge planning if the patient needs post-hospital services based on physician/advanced practitioner order or complex needs related to functional status, cognitive ability, or social support system  Outcome: Progressing     Problem: CARDIOVASCULAR - ADULT  Goal: Maintains optimal cardiac output and hemodynamic stability  Description  INTERVENTIONS:  - Monitor I/O, vital signs and rhythm  - Monitor for S/S and trends of decreased cardiac output i e  bleeding, hypotension  - Administer and titrate ordered vasoactive medications to optimize hemodynamic stability  - Assess quality of pulses, skin color and temperature  - Assess for signs of decreased coronary artery perfusion - ex   Angina  - Instruct patient to report change in severity of symptoms  Outcome: Progressing  Goal: Absence of cardiac dysrhythmias or at baseline rhythm  Description  INTERVENTIONS:  - Continuous cardiac monitoring, monitor vital signs, obtain 12 lead EKG if indicated  - Administer antiarrhythmic and heart rate control medications as ordered  - Monitor electrolytes and administer replacement therapy as ordered  Outcome: Progressing     Problem: METABOLIC, FLUID AND ELECTROLYTES - ADULT  Goal: Electrolytes maintained within normal limits  Description  INTERVENTIONS:  - Monitor labs and assess patient for signs and symptoms of electrolyte imbalances  - Administer electrolyte replacement as ordered  - Monitor response to electrolyte replacements, including repeat lab results as appropriate  - Instruct patient on fluid and nutrition as appropriate  Outcome: Progressing  Goal: Fluid balance maintained  Description  INTERVENTIONS:  - Monitor labs and assess for signs and symptoms of volume excess or deficit  - Monitor I/O and WT  - Instruct patient on fluid and nutrition as appropriate  Outcome: Progressing  Goal: Glucose maintained within target range  Description  INTERVENTIONS:  - Monitor Blood Glucose as ordered  - Assess for signs and symptoms of hyperglycemia and hypoglycemia  - Administer ordered medications to maintain glucose within target range  - Assess nutritional intake and initiate nutrition service referral as needed  Outcome: Progressing     Problem: SKIN/TISSUE INTEGRITY - ADULT  Goal: Skin integrity remains intact  Description  INTERVENTIONS  - Identify patients at risk for skin breakdown  - Assess and monitor skin integrity  - Assess and monitor nutrition and hydration status  - Monitor labs (i e  albumin)  - Assess for incontinence   - Turn and reposition patient  - Assist with mobility/ambulation  - Relieve pressure over bony prominences  - Avoid friction and shearing  - Provide appropriate hygiene as needed including keeping skin clean and dry  - Evaluate need for skin moisturizer/barrier cream  - Collaborate with interdisciplinary team (i e  Nutrition, Rehabilitation, etc )   - Patient/family teaching  Outcome: Progressing  Goal: Incision(s), wounds(s) or drain site(s) healing without S/S of infection  Description  INTERVENTIONS  - Assess and document risk factors for skin impairment   - Assess and document dressing, incision, wound bed, drain sites and surrounding tissue  - Initiate Nutrition services consult and/or wound management as needed  Outcome: Progressing  Goal: Oral mucous membranes remain intact  Description  INTERVENTIONS  - Assess oral mucosa and hygiene practices  - Implement preventative oral hygiene regimen  - Implement oral medicated treatments as ordered  - Initiate Nutrition services referral as needed  Outcome: Progressing

## 2019-03-13 NOTE — CONSULTS
Consultation - Cardiothoracic Surgery   Reagan Pryor 46 y o  male MRN: [id number]  Unit/Bed#: [address] 875-63 Encounter: [id number]    Physician Requesting Consult: Megha Leone MD    Reason for Consult / Principal Problem: MV CAD    Inpatient consult to Cardiothoracic Surgery  Consult performed by: Katie Churchill PA-C  Consult ordered by: Edwige Weinstein MD        History of Present Illness: Reagan Pryor is a 46y o  year old male with a PMH significant for h/o IVDA (last use 7/2018), Hepatitis C, and familial hypercholesteremia, who presented to [facility] with SOB and AMS  Patient has leukocytosis and was afebrile  Blood cultures were positive for MSSA and LLL PE was found on CTA  Troponins were elevated at 4 72  He was started on a heparin drip and transitioned to Xarelto (last dose 3/13 at 9am)  Follow up COLEMAN showed EF 25-30% and no vegetations  Cardiac cath showed  of mid LAD, 90% stenosis of proximal LAD, 80% stenosis of ramus, and 90% stenosis of mid RCA  He was transferred to [facility] for cardiac surgery consultation  Today, the patient is sitting comfortably in bed with no complaints  He claims he was experiencing CP and SOB with exertion for the past couple years  Substernal chest pain would not radiate and symptoms would resolve with rest  He has a family history significant for hypercholesteremia for which he followed with a cardiologist for several years  He has not seen a cardiologist or taken cholesterol medication since his 25s  His father had an MI at age 27 and his uncle at age 34  He lives with his girlfriend and her parents  He is currently unemployed  He has quit tobacco and alcohol  He claims he has not used abused drugs since July 2018  He has attended rehab in the past  He performs ADLs independently, drives, and ambulates without assistance        Past Medical History:  Past Medical History:   Diagnosis Date    CAD (coronary artery disease)     Drug use     heroin, amphetamines, opiates    Former tobacco use     Hepatitis C     Hyperlipidemia     Hypertension     Pulmonary embolus (HCC)     Rib fracture     right lateral 7th rib       Past Surgical History:   Past Surgical History:   Procedure Laterality Date    CARDIAC CATHETERIZATION           Family History:  Family History   Problem Relation Age of Onset    Coronary artery disease Father     Hyperlipidemia Father          Social History:  Social History     Substance and Sexual Activity   Alcohol Use Yes    Alcohol/week: 1 8 oz    Types: 3 Cans of beer per week    Frequency: 2-4 times a month    Drinks per session: 1 or 2    Binge frequency: Less than monthly     Social History     Substance and Sexual Activity   Drug Use Not Currently    Types: Heroin    Comment: 6 months      Social History     Tobacco Use   Smoking Status Current Every Day Smoker    Packs/day: 0 25    Types: Cigarettes   Smokeless Tobacco Never Used     Marital Status: Single      Home Medications:   Prior to Admission medications    Medication Sig Start Date End Date Taking?  Authorizing Provider   rivaroxaban (XARELTO) 10 mg tablet Take 1 5 tablets (15 mg total) by mouth 2 (two) times a day for 21 days 3/9/19 3/30/19  JOE Titus       Inpatient Medications:  Scheduled Meds:   Current Facility-Administered Medications:  aspirin 81 mg Oral Daily Nereida Henson MD    atorvastatin 80 mg Oral Daily With Antonio Ortiz MD    cefazolin 2,000 mg Intravenous Aida Montero MD Last Rate: 2,000 mg (03/13/19 0230)   guaiFENesin 600 mg Oral Q12H PRN Lary Monzon MD    lisinopril 2 5 mg Oral Daily Lary Monzon MD    metoprolol succinate 25 mg Oral Daily Lugene Yee, DO    nicotine 7 mg Transdermal Daily Nereida Henson MD    rivaroxaban 15 mg Oral BID With Meals Nereida Henson MD      Continuous Infusions:    PRN Meds:    guaiFENesin 600 mg Q12H PRN       Allergies:  No Known Allergies    Review of Systems:  Review of Systems   Constitutional: Positive for activity change  Negative for chills and fatigue  HENT: Negative for hearing loss, nosebleeds and voice change  Eyes: Negative for pain and visual disturbance  Respiratory: Positive for chest tightness and shortness of breath  Negative for cough  Cardiovascular: Positive for chest pain  Negative for palpitations and leg swelling  Gastrointestinal: Negative for abdominal pain, blood in stool, nausea and vomiting  Endocrine: Negative for cold intolerance and heat intolerance  Genitourinary: Negative for dysuria, enuresis and hematuria  Musculoskeletal: Negative for arthralgias, back pain and gait problem  Skin: Negative for color change and rash  Neurological: Positive for dizziness and light-headedness  Negative for seizures and syncope  Hematological: Negative for adenopathy  Does not bruise/bleed easily  Psychiatric/Behavioral: Negative for agitation and behavioral problems  Vital Signs:     Vitals:    03/13/19 0301 03/13/19 0503 03/13/19 0600 03/13/19 0700   BP: 96/52   104/61   BP Location: Right arm   Left arm   Pulse: (!) 47   (!) 52   Resp: 18   18   Temp: 98 °F (36 7 °C)   97 9 °F (36 6 °C)   TempSrc: Oral   Oral   SpO2: 99%   98%   Weight:  98 6 kg (217 lb 4 8 oz) 98 6 kg (217 lb 4 8 oz)      Invasive Devices     Peripheral Intravenous Line            Peripheral IV 03/10/19 Left Hand 2 days                Physical Exam:  Physical Exam   Constitutional: He is oriented to person, place, and time  He appears well-developed and well-nourished  No distress  HENT:   Head: Normocephalic and atraumatic  Eyes: Pupils are equal, round, and reactive to light  Neck: Neck supple  No JVD present  Cardiovascular: Normal rate and regular rhythm  Exam reveals no friction rub  Murmur heard  Pulmonary/Chest: Effort normal and breath sounds normal  He has no wheezes  He has no rales  Abdominal: Soft   Bowel sounds are normal  He exhibits no distension  There is no tenderness  Neurological: He is alert and oriented to person, place, and time  Skin: Skin is warm and dry  No rash noted  He is not diaphoretic  No erythema  Psychiatric: He has a normal mood and affect  His behavior is normal        Lab Results:     Results from last 7 days   Lab Units 03/13/19  0530 03/12/19  0503 03/11/19  0512   WBC Thousand/uL 8 22 8 02 6 66   HEMOGLOBIN g/dL 13 6 13 7 13 9   HEMATOCRIT % 41 1 42 1 42 7   PLATELETS Thousands/uL 207 219 204     Results from last 7 days   Lab Units 03/13/19  0530 03/12/19  0503 03/11/19  0512  03/06/19  2148   POTASSIUM mmol/L 3 8 4 3 4 0   < >  --    CHLORIDE mmol/L 107 106 105   < >  --    CO2 mmol/L 25 25 24   < >  --    CO2, I-STAT mmol/L  --   --   --   --  27   BUN mg/dL 8 10 8   < >  --    CREATININE mg/dL 0 65 0 69 0 63   < >  --    GLUCOSE, ISTAT mg/dl  --   --   --   --  91   CALCIUM mg/dL 8 5 8 6 8 6   < >  --     < > = values in this interval not displayed  Results from last 7 days   Lab Units 03/11/19  0513 03/10/19  0534 03/09/19  0505 03/08/19  0634  03/06/19  2141   INR  1 23*  --   --   --   --  1 15   PTT seconds  --  32 70* 64*   < > 31    < > = values in this interval not displayed  No results found for: HGBA1C  Lab Results   Component Value Date    CKTOTAL 1,281 (H) 03/06/2019    CKMB 8 5 (H) 03/06/2019    CKMBINDEX <1 0 03/06/2019    TROPONINI 4 68 (H) 03/07/2019       Imaging Studies:     Cardiac Catheterization: 3/12  CORONARY CIRCULATION:  Left main: Normal   Proximal LAD: There was a tubular 90 % stenosis  Mid LAD: There was a 100 % stenosis  This lesion is a chronic total occlusion  Distal LAD: Angiography showed moderate atherosclerosis  The distal vessel was supplied by limited collaterals  1st diagonal: The vessel was normal sized  Angiography showed minor luminal irregularities  Circumflex: The vessel was medium sized (co-dominant)   Angiography showed mild atherosclerosis  1st obtuse marginal: The vessel was normal sized  Angiography showed minor luminal irregularities  1st left posterolateral: The vessel was normal sized  Angiography showed minor luminal irregularities  Ramus intermedius: There was a discrete 80 % stenosis  Mid RCA: There was a tubular 90 % stenosis  Right PDA: The vessel was normal sized  Angiography showed mild atherosclerosis  COLEMAN: 3/11  SUMMARY     LEFT VENTRICLE:  Systolic function was mildly to moderately reduced      MITRAL VALVE:  There was mild regurgitation  There was no echocardiographic evidence of vegetation      AORTIC VALVE:  There was no echocardiographic evidence of vegetation      TRICUSPID VALVE:  There was trace regurgitation  There was no echocardiographic evidence of vegetation      AORTA:  There was mild atheroma in the mid descending aorta  Echocardiogram: 3/7  SUMMARY     LEFT VENTRICLE:  The ventricle was mildly dilated  Ejection fraction was estimated to be 25 % to 30%  There was diffuse hypokinesis with svere hypkinesis of the septum and inferior wall  The septum is thinned out      RIGHT VENTRICLE:  The ventricle was mildly dilated  Systolic function was mildly reduced  Estimated peak pressure was at least 30 mmHg      LEFT ATRIUM:  The atrium was mildly to moderately dilated      RIGHT ATRIUM:  The atrium was dilated      TRICUSPID VALVE:  There was trace regurgitation      I have personally reviewed pertinent films in PACS    Assessment:  Patient Active Problem List    Diagnosis Date Noted    Ischemic cardiomyopathy (severe EF 25%) 03/13/2019    CAD (coronary artery disease) 03/13/2019    Polysubstance abuse ([address] ) 03/13/2019    On continuous oral anticoagulation 03/13/2019    Transaminitis 03/13/2019    TSH elevation 03/12/2019    Hepatitis C 03/11/2019    Bacteremia due to Staphylococcus aureus (MSSA) 03/09/2019    NSTEMI (non-ST elevated myocardial infarction) ([address] ) 03/07/2019    Closed traumatic nondisplaced fracture of one rib of right side 03/07/2019    Acute subsegmental PE of LLE 03/07/2019    Tobacco abuse 03/07/2019    Hyperlipidemia 02/21/2014    Essential hypertension 02/21/2014     Severe coronary artery disease; Ongoing CABG workup    Plan:  Risks and benefits of coronary artery bypass grafting were discussed in detail today with the patient  They understand and wish to proceed with further workup and ultimately surgical intervention  We have ordered routine preoperative laboratory and vascular studies  Pending the results of these tests, they will be scheduled for surgery with Yaw Rajan was comfortable with our recommendations, and their questions were answered to their satisfaction  We will continue to evaluate the patient daily with further recommendations as work up is completed  Thank you for allowing us to participate in the care of this patient       SIGNATURE: Fidel Johnson PA-C  DATE: March 13, 2019  TIME: 9:02 AM

## 2019-03-13 NOTE — PROGRESS NOTES
[facility] Senior Admission Note   Unit/Bed # @DBLINK (NABIL,59330)@ Encounter: [id number]  SOD Team B           Maicol Bolanos 46 y o  male [id number]       Patient seen and examined  Reviewed H&P per Dr Jose Lehman    Agree with the assessment and plan as outlined in his H&P    Assessment/Plan:   Principal Problem:    NSTEMI (non-ST elevated myocardial infarction) ([address] )  Active Problems:    Closed traumatic nondisplaced fracture of one rib of right side    Acute subsegmental PE of LLE    Tobacco abuse    Bacteremia due to Staphylococcus aureus (MSSA)    Hepatitis C    TSH elevation    Hyperlipidemia    Essential hypertension    Ischemic cardiomyopathy (severe EF 25%)    CAD (coronary artery disease)    Polysubstance abuse ([address] )    On continuous oral anticoagulation    Transaminitis         Disposition:  INPATIENT     Expected LOS: >2 Midnights      Camryn Riojas MD

## 2019-03-13 NOTE — UTILIZATION REVIEW
Notification of Inpatient Admission/Inpatient Authorization Request  This is a Notification of Inpatient Admission/Request for Inpatient Authorization for our facility [place] [address]  Be advised that this patient was admitted to our facility under Inpatient Status  Please contact the Utilization Review Department where the patient is receiving care services for additional admission information  Place of Service Code: 24   Place of Service Name: Inpatient Hospital  Presentation Date & Time: 3/12/2019 10:28 PM  Inpatient Admission Date & Time: 3/12/19 2228  Discharge Date & Time: No discharge date for patient encounter  Discharge Disposition (if discharged): [address] [place] [address]  Attending Physician: Charly Otero, [address] [[id number]]  Admission Orders (From admission, onward)    Ordered        03/13/19 0116  Inpatient Admission  Once     Order ID Start Status   [id number] 03/13/19 0117 Completed              Facility: [address])  [facility] Utilization Review Department  Phone: [phone]; Fax [phone]  [email or web address]  ATTENTION: Please call with any questions or concerns to [phone]  and carefully listen to the prompts so that you are directed to the right person  Send all requests for admission clinical reviews, approved or denied determinations and any other requests to fax [phone]   All voicemails are confidential

## 2019-03-13 NOTE — H&P
INTERNAL MEDICINE HISTORY AND PHYSICAL  [facility] 412-01 SOD Team B     NAME: Violetta Muñoz  AGE: 46 y o  SEX: male  : 1980   MRN: [id number]  ENCOUNTER: [id number]    DATE: 3/13/2019  TIME: 5:15 AM    Primary Care Physician: No primary care provider on file  Admitting Provider: Jolly Pham MD    Chief complaint: ICM / PE / MSSA Bacteremia     History of Present Illness     Violetta Muñoz is a 46 y o  male with a past medical history significant for IV drug abuse who presented to [facility] on  with altered mental status and sepsis  Initial differential of the patient's altered mental status at that time include seizure versus encephalopathy due to drugs  The patient was found to have an elevated troponin level which peaked at 4 72  Further evaluation revealed that the patient had a left lower lobe subsegmental pulmonary embolism for which she was started on a heparin drip  Also there was concern for aspiration pneumonia however Infectious Disease decided to not treat the patient at this time  However on  blood cultures became positive for MSSA  Repeat blood cultures on  were negative  The patient was started on IV cefazolin and will be treated until   The patient's heparin drip for the pulmonary embolism was discontinued and he was transitioned to Xarelto  Further evaluation included an echocardiogram which revealed diffuse hypokinesis with ejection fraction of 25-30%  Note the patient has a family history significant for familial hypercholesterolemia  His father had a myocardial infarction at the age of 27 and his uncle had a myocardial infarction at the age of 34  A COLEMAN was performed which was negative for vegetation  On , the patient underwent a cardiac catheterization which revealed a 90% occlusion of the proximal LAD, a 100% occlusion of the mid LAD, and a 90% occlusion of the mid RCA    The patient was transferred to [facility] on March 13th for a CT surgery evaluation with Dr Oscar Block  Upon speaking with the patient at the bedside, the patient has no complaints at this time  He denies chest pain, shortness of breath, nausea, vomiting, diarrhea, fevers/chills  All vital signs are stable  Review of Systems   Review of Systems   Constitutional: Negative  HENT: Negative  Eyes: Negative  Respiratory: Negative  Cardiovascular: Negative  Gastrointestinal: Negative  Endocrine: Negative  Genitourinary: Negative  Musculoskeletal: Negative  Skin: Negative  Allergic/Immunologic: Negative  Neurological: Negative  Hematological: Negative  Psychiatric/Behavioral: Negative  Past Medical History     Past Medical History:   Diagnosis Date    CAD (coronary artery disease)     Drug use     heroin, amphetamines, opiates    Former tobacco use     Hepatitis C     Hyperlipidemia     Hypertension     Pulmonary embolus ([address] )     Rib fracture     right lateral 7th rib       Past Surgical History     Past Surgical History:   Procedure Laterality Date    CARDIAC CATHETERIZATION         Social History     Social History     Substance and Sexual Activity   Alcohol Use Yes    Alcohol/week: 1 8 oz    Types: 3 Cans of beer per week    Frequency: 2-4 times a month    Drinks per session: 1 or 2    Binge frequency: Less than monthly     Social History     Substance and Sexual Activity   Drug Use Not Currently    Types: Heroin    Comment: 6 months      Social History     Tobacco Use   Smoking Status Current Every Day Smoker    Packs/day: 0 25    Types: Cigarettes   Smokeless Tobacco Never Used       Family History     Family History   Problem Relation Age of Onset    Coronary artery disease Father     Hyperlipidemia Father        Medications Prior to Admission     Prior to Admission medications    Medication Sig Start Date End Date Taking?  Authorizing Provider   rivaroxaban (XARELTO) 10 mg tablet Take 1 5 tablets (15 mg total) by mouth 2 (two) times a day for 21 days 3/9/19 3/30/19  Donna Paddy, CRNP       Allergies   No Known Allergies    Objective     Vitals:    03/12/19 2245 03/13/19 0301   BP: 102/59 96/52   BP Location: Left arm Right arm   Pulse: (!) 52 (!) 47   Resp: 20 18   Temp: 98 6 °F (37 °C) 98 °F (36 7 °C)   TempSrc: Oral Oral   SpO2: 99% 99%     There is no height or weight on file to calculate BMI  No intake or output data in the 24 hours ending 03/13/19 0515  Invasive Devices     Peripheral Intravenous Line            Peripheral IV 03/10/19 Left Hand 2 days                Physical Exam   Constitutional: He is oriented to person, place, and time  He appears well-developed and well-nourished  No distress  HENT:   Head: Normocephalic and atraumatic  Nose: Nose normal    Mouth/Throat: No oropharyngeal exudate  Eyes: Conjunctivae are normal  Right eye exhibits no discharge  Left eye exhibits no discharge  No scleral icterus  Neck: Neck supple  No JVD present  Cardiovascular: Normal rate, regular rhythm, normal heart sounds and intact distal pulses  Exam reveals no gallop and no friction rub  No murmur heard  Pulmonary/Chest: Effort normal and breath sounds normal  No respiratory distress  He has no wheezes  He has no rales  Abdominal: Soft  Bowel sounds are normal  He exhibits no distension  There is no tenderness  There is no rebound and no guarding  Musculoskeletal: Normal range of motion  He exhibits no edema  Neurological: He is alert and oriented to person, place, and time  Skin: Skin is warm and dry  He is not diaphoretic  No erythema  Psychiatric: He has a normal mood and affect  Lab Results: I have personally reviewed pertinent reports      CBC:   Results from last 7 days   Lab Units 03/12/19  0503   WBC Thousand/uL 8 02   RBC Million/uL 4 59   HEMOGLOBIN g/dL 13 7   HEMATOCRIT % 42 1   MCV fL 92   MCH pg 29 8   MCHC g/dL 32 5   RDW % 13 9   MPV fL 11 5   PLATELETS Thousands/uL 219   NRBC AUTO /100 WBCs 0   NEUTROS PCT % 52   LYMPHS PCT % 28   MONOS PCT % 11   EOS PCT % 6   BASOS PCT % 1   NEUTROS ABS Thousands/µL 4 16   LYMPHS ABS Thousands/µL 2 25   MONOS ABS Thousand/µL 0 91   EOS ABS Thousand/µL 0 45   , Chemistry Profile:   Results from last 7 days   Lab Units 03/12/19  0503  03/06/19  2148   POTASSIUM mmol/L 4 3   < >  --    CHLORIDE mmol/L 106   < >  --    CO2 mmol/L 25   < >  --    CO2, I-STAT mmol/L  --   --  27   BUN mg/dL 10   < >  --    CREATININE mg/dL 0 69   < >  --    GLUCOSE, ISTAT mg/dl  --   --  91   CALCIUM mg/dL 8 6   < >  --    AST U/L 82*  --   --    ALT U/L 124*  --   --    ALK PHOS U/L 64  --   --    EGFR ml/min/1 73sq m 120   < >  --     < > = values in this interval not displayed  , Coagulation Studies:   Results from last 7 days   Lab Units 03/11/19  0513 03/10/19  0534   PROTIME seconds 15 4*  --    INR  1 23*  --    PTT seconds  --  32   , Cardiac Studies:   Results from last 7 days   Lab Units 03/07/19  0944   TROPONIN I ng/mL 4 68*   , Additional Labs:   Results from last 7 days   Lab Units 03/12/19  0503 03/06/19  2141   MAGNESIUM mg/dL 1 9 1 8   PHOSPHORUS mg/dL  --  6 1*   CK TOTAL U/L  --  1,281*   , iSTAT CHEM 8:   Results from last 7 days   Lab Units 03/12/19  0503  03/06/19  2148 03/06/19  2146   SODIUM, I-STAT mmol/l  --   --  135* 136   AGAP mmol/L  --   --   --  19*   ANION GAP mmol/L 9   < >  --   --    BUN, I-STAT mg/dl  --   --   --  12   CREATININE, I-STAT mg/dl  --   --   --  1 1   EGFR ml/min/1 73sq m 120   < >  --  85   GLUCOSE, ISTAT mg/dl  --   --  91 93   HEMOGLOBIN g/dL 13 7   < >  --   --    I STAT HEMOGLOBIN g/dl  --   --  15 0 14 6    < > = values in this interval not displayed     , ABG:   Results from last 7 days   Lab Units 03/06/19  2213   PH ART  7 355   PCO2 ART mm Hg 46 0*   PO2 ART mm Hg 72 3*   HCO3 ART mmol/L 25 1   BASE EXC ART mmol/L -0 8   ABG SOURCE  Radial, Left   , Toxicology:   Results from last 7 days   Lab Units 03/06/19  2151 03/06/19  2141   BARBITURATE UR  Negative  --    BENZODIAZEPINE UR  Negative  --    COCAINE UR  Negative  --    OPIATE UR  Positive*  --    PCP UR  Negative  --    THC UR  Negative  --    ETHANOL LVL mg/dL  --  <3   ACETAMINOPHEN LVL ug/mL  --  <7 1*   SALICYLATE LVL mg/dL  --  <3*   , Last A1C/Lipid Panel/Thyroid Panel:   Lab Results   Component Value Date    MMO2LMQIDIAV 8 323 (H) 03/06/2019       Imaging: I have personally reviewed pertinent films in PACS  Xr Chest 1 View Portable    Result Date: 3/7/2019  Narrative: CHEST INDICATION:   shortness of breath  COMPARISON:  Chest CT performed today  EXAM PERFORMED/VIEWS:  XR CHEST PORTABLE FINDINGS: Cardiomediastinal silhouette appears unremarkable  Multifocal airspace opacities are better appreciated on subsequent CT  No pneumothorax or pleural effusion  Osseous structures appear within normal limits for patient age  Impression: Multifocal airspace opacities are better appreciated on subsequent CT  Workstation performed: [id number]     Ct Head Without Contrast    Result Date: 3/6/2019  Narrative: CT BRAIN - WITHOUT CONTRAST INDICATION:   Altered mental status  COMPARISON:  None  TECHNIQUE:  CT examination of the brain was performed  In addition to axial images, coronal 2D reformatted images were created and submitted for interpretation  Radiation dose length product (DLP) for this visit:  959 mGy-cm   This examination, like all CT scans performed in the [facility], was performed utilizing techniques to minimize radiation dose exposure, including the use of iterative reconstruction and automated exposure control  IMAGE QUALITY:  Diagnostic  FINDINGS: PARENCHYMA:  No intracranial mass, mass effect or midline shift  No CT signs of acute infarction  No acute parenchymal hemorrhage  VENTRICLES AND EXTRA-AXIAL SPACES:  Normal for the patient's age   VISUALIZED ORBITS AND PARANASAL SINUSES: Unremarkable  CALVARIUM AND EXTRACRANIAL SOFT TISSUES:  Normal      Impression: No acute intracranial abnormality  Workstation performed: [id number]     Cta Ed Chest Pe Study    Result Date: 3/6/2019  Narrative: CTA - CHEST WITH IV CONTRAST - PULMONARY ANGIOGRAM INDICATION:   sob, hypoxia, elevated trop  COMPARISON: None  TECHNIQUE: CTA examination of the chest was performed using angiographic technique according to a protocol specifically tailored to evaluate for pulmonary embolism  Axial, sagittal, and coronal 2D reformatted images were created from the source data and  submitted for interpretation  In addition, coronal 3D MIP postprocessing was performed on the acquisition scanner  Radiation dose length product (DLP) for this visit:  1199 98 mGy-cm   This examination, like all CT scans performed in the [place], was performed utilizing techniques to minimize radiation dose exposure, including the use of iterative reconstruction and automated exposure control  IV Contrast:  85 mL of iohexol (OMNIPAQUE)  FINDINGS: Study is limited due to motion artifact  PULMONARY ARTERIAL TREE:  There is a subsegmental branch left lower lobe pulmonary emboli (series 5 image 85)  LUNGS:  The trachea and central bronchial tree are patent  Diffuse airspace opacities are seen within the right upper lobe and left lung  PLEURA:  Unremarkable  HEART/GREAT VESSELS:  Unremarkable for patient's age  MEDIASTINUM AND JOSIAH:  The sagittal lymph nodes measuring up to 1 1 cm are seen  CHEST WALL AND LOWER NECK:   Unremarkable  VISUALIZED STRUCTURES IN THE UPPER ABDOMEN:  Hepatic steatosis and hepatomegaly  OSSEOUS STRUCTURES:  Right lateral 7th rib fracture is seen  Impression: Subsegmental branch left lower lobe pulmonary emboli Diffuse airspace opacities within the right upper lobe and left lung  Findings may represent multifocal pneumonia versus pulmonary edema  Right lateral 7th rib nondisplaced fracture    I personally discussed this study with Krish Santos on 3/6/2019 at 11:16 PM  Workstation performed: [id number]     Urinalysis:   Results from last 7 days   Lab Units 03/06/19  2151   COLOR UA  Yellow   CLARITY UA  Clear   SPEC GRAV UA  1 025   PH UA  6 0   LEUKOCYTES UA  Negative   NITRITE UA  Negative   GLUCOSE UA mg/dl Negative   KETONES UA mg/dl Negative   BILIRUBIN UA  Negative   BLOOD UA  Trace-Intact*        Urine Micro:   Results from last 7 days   Lab Units 03/06/19  2151   RBC UA /hpf 0-1*   WBC UA /hpf 0-1*   EPITHELIAL CELLS WET PREP /hpf Occasional   BACTERIA UA /hpf Occasional        EKG, Pathology, and Other Studies: I have personally reviewed pertinent reports  Medications given in Emergency Department     Medication Administration - last 24 hours from 03/12/2019 0515 to 03/13/2019 0515       Date/Time Order Dose Route Action Action by     03/13/2019 0230 ceFAZolin (ANCEF) 2,000 mg in sodium chloride 0 9 % 50 mL IVPB 2,000 mg Intravenous New Bag Bashir Bah RN          Assessment and Plan     1  NSTEMI  · Patient presented to [facility] with altered mental status  Troponin peaked at 4 72  EKG reveals sinus tachycardia with occasional premature ventricular complexes  Cardiac catheterization on March 12th revealed a 90% occlusion of the proximal LAD, a 100% occlusion of the mid LAD, and 90% occlusion of the mid RCA  The patient has a family history significant for familial hypercholesterolemia in both his father and his uncle  The patient currently denies chest pain, shortness of breath, diaphoresis  · Monitor on telemetry  · Metoprolol succinate 25 mg daily  · Atorvastatin 80 mg daily  · Aspirin 81 mg daily  · Lisinopril 2 5 mg daily  · Check lipid panel  · Currently anticoagulated with Xarelto 50 mg 2 times daily  · CT surgery consult for possible CABG    2   Acute Subsegmental PE of LLL  · CT PE study at [facility] revealed a left lower lobe subsegmental pulmonary embolism  The patient was thus started on heparin drip  He was later transitioned to Xarelto  The patient currently denies chest pain, shortness of breath, syncope  The patient is currently saturating well on room air  · Xarelto 15 mg BID  · Continue to monitor clinically    3  MSSA Bacteremia   · The patient has a history of IV drug abuse however states that he has been abstinent since June of 2018  Urinary drug screen was positive for methamphetamine and opiates  Blood cultures on March 6th became positive for MSSA  Repeat blood cultures on March 8th were negative  Chad negative for vegetation  The patient was started on IV cefazolin per ID and will continue therapy until March 22nd  ID states that the patient is appropriate for PICC line placement  · Cefazolin 2000 mg IV q8 hours until 3/22/2019  · Day team to correlate with CT surgery in regards to PICC line placement  · Monitor fever curves and WBC count    4  Essential Hypertension  · Systolic blood pressures in the low 100s  · Lisinopril 2 5 mg daily   · Continue cardiopulmonary monitoring    5  Hyperlipidemia  · Patient with a family history of familial hypercholesterolemia in his father and his uncle  Patient appears to be on atorvastatin 10 mg at home  · Increase Atorvastatin to 80 mg daily  · Check lipid panel    6  Ischemic Cardiomyopathy with an EF of 25%  · Echocardiogram on March 7th revealed an ejection fraction of 25-30% with diffuse hypokinesis of the septum and inferior wall  Likely ischemic in nature secondary to NSTEMI  Physical exam unremarkable for signs of volume overload  The patient is saturating well on room air    · Continue medical management as above  · Monitor for signs of volume overload    Code Status: Level 1 - Full Code  VTE Pharmacologic Prophylaxis: Reason for no pharmacologic prophylaxis Xarelto   VTE Mechanical Prophylaxis: sequential compression device  Admission Status: INPATIENT Admission Time  I spent 45 minutes admitting the patient  This involved direct patient contact where I performed a full history and physical, reviewing previous records, and reviewing laboratory and other diagnostic studies      Mel Soriano, DO  Internal Medicine  PGY-1

## 2019-03-13 NOTE — PROGRESS NOTES
General Cardiology   Progress Note   Alexis Villalba 46 y o  male MRN: [id number]  Unit/Bed#: -01 Encounter: [id number]        Subjective:    Patient denies having any chest pain shortness of breath at rest   Cardiac catheterization showed significant lesions in the LAD circumflex, ramus  Objective:   Vitals:  Vitals:    03/12/19 2002   BP: 121/76   Pulse: 70   Resp: 20   Temp: 98 4 °F (36 9 °C)   SpO2: 99%       Body mass index is 30 38 kg/m²  Systolic (29IXZ), XDZ:456 , Min:99 , GFV:344     Diastolic (91RIB), DVX:52, Min:55, Max:85      Intake/Output Summary (Last 24 hours) at 3/12/2019 2310  Last data filed at 3/12/2019 0100  Gross per 24 hour   Intake    Output 450 ml   Net -450 ml     Weight (last 2 days) before discharge     None          Telemetry Review: No significant arrhythmias seen on telemetry review  PHYSICAL EXAMS:  General:  Patient is not in acute distress, laying in the bed comfortably, awake, alert responding to commands  Head: Normocephalic, Atraumatic  HEENT: White sclera, pink conjunctiva,  PERRLA,pharynx benign  Neck:  Supple, no neck vein distention, carotids+2/+2 no bruits, thyromegaly, adenopathy  Respiratory: clear to P/A  Cardiovascular:  PMI normal, S1-S2 normal, No  Murmurs, thrills, gallops, rubs   Regular rhythm  GI:  Abdomen soft nontender   No hepatosplenomegaly, adenopathy, ascites,or rebound tenderness  Extremities: No edema, normal pulses, no calf tenderness, no joint deformities, no venous disease   Integument:  No skin rashes or ulceration  Lymphatic:  No cervical or inguinal lymphadenopathy  Neurologic:  Patient is awake alert, responding to command, well-oriented to time and place and person moving all extremities      LABORATORY RESULTS:  Results from last 7 days   Lab Units 03/07/19  0944 03/07/19  0634 03/07/19  0151 03/06/19  2141   CK TOTAL U/L  --   --   --  1,281*   TROPONIN I ng/mL 4 68* 4 72* 2 48* 0 12*   CK MB INDEX %  --   --   --  <1 0     CBC with diff: Results from last 7 days   Lab Units 03/12/19  0503 03/11/19  0512 03/10/19  0534  03/07/19  0634  03/06/19  2141   WBC Thousand/uL 8 02 6 66 5 63   < > 14 75*  --  20 20*   HEMOGLOBIN g/dL 13 7 13 9 13 1   < > 12 8  --  13 8   I STAT HEMOGLOBIN   --   --   --   --   --    < >  --    HEMATOCRIT % 42 1 42 7 40 4   < > 38 9  --  40 8   HEMATOCRIT, ISTAT   --   --   --   --   --    < >  --    MCV fL 92 92 92   < > 91  --  90   PLATELETS Thousands/uL 219 204 200   < > 200  --  241   MCH pg 29 8 30 0 29 7   < > 29 9  --  30 3   MCHC g/dL 32 5 32 6 32 4   < > 32 9  --  33 8   RDW % 13 9 14 0 13 8   < > 13 2  --  13 3   MPV fL 11 5 11 5 11 3   < > 11 5  --  11 2   NRBC AUTO /100 WBCs 0  --   --   --  0  --  0    < > = values in this interval not displayed  CMP:  Results from last 7 days   Lab Units 03/12/19  0503 03/11/19  0512 03/10/19  0534  03/06/19 2148 03/06/19 2146 03/06/19  2141   POTASSIUM mmol/L 4 3 4 0 3 7   < >  --   --   --  3 5   CHLORIDE mmol/L 106 105 104   < >  --   --   --  98*   CO2 mmol/L 25 24 26   < >  --   --   --  25   CO2, I-STAT mmol/L  --   --   --   --  27 26   < >  --    BUN mg/dL 10 8 5   < >  --   --   --  13   CREATININE mg/dL 0 69 0 63 0 65   < >  --   --   --  1 29   GLUCOSE, ISTAT mg/dl  --   --   --   --  91 93  --   --    CALCIUM mg/dL 8 6 8 6 8 8   < >  --   --   --  8 8   AST U/L 82*  --   --   --   --   --   --  108*   ALT U/L 124*  --   --   --   --   --   --  119*   ALK PHOS U/L 64  --   --   --   --   --   --  82   EGFR ml/min/1 73sq m 120 125 123   < >  --  85  --  70    < > = values in this interval not displayed         BMP:  Results from last 7 days   Lab Units 03/12/19  0503 03/11/19  0512 03/10/19  0534  03/06/19  2148   POTASSIUM mmol/L 4 3 4 0 3 7   < >  --    CHLORIDE mmol/L 106 105 104   < >  --    CO2 mmol/L 25 24 26   < >  --    CO2, I-STAT mmol/L  --   --   --   --  27   BUN mg/dL 10 8 5   < >  --    CREATININE mg/dL 0 69 0 63 0 65   < >  -- GLUCOSE, ISTAT mg/dl  --   --   --   --  91   CALCIUM mg/dL 8 6 8 6 8 8   < >  --     < > = values in this interval not displayed  Results from last 7 days   Lab Units 19  0944   NT-PRO BNP pg/mL 1,425*      Results from last 7 days   Lab Units 19  0503 19  2141   MAGNESIUM mg/dL 1 9 1 8         Results from last 7 days   Lab Units 19  2141   TSH 3RD GENERATON uIU/mL 8 323*     Results from last 7 days   Lab Units 19  0513 19  2141   INR  1 23* 1 15       Lipid Profile:   No results found for: CHOL  No results found for: HDL  No results found for: LDLCALC  No results found for: TRIG    Cardiac testing:   Results for orders placed during the hospital encounter of 19   Echo complete with contrast if indicated    Narrative [address] [place] [address]  [phone]    Transthoracic Echocardiogram  2D, M-mode, Doppler, and Color Doppler    Study date:  07-Mar-2019    Patient: Jr Klein  MR number: [id number]  Account number: [de-identified]  : 1980  Age: 46 years  Gender: Male  Status: Inpatient  Location: Emergency room  Height: 72 in  Weight: 231 lb  BP: 134/ 63 mmHg    Indications: Pulmonary embolism    Diagnoses: I26 99 - Other pulmonary embolism without acute cor pulmonale    Sonographer:  Marleny Galarza  Referring Physician:  Sang Tran PA-C  Group:  [facility]  Interpreting Physician:  Abelino Bush MD    SUMMARY    LEFT VENTRICLE:  The ventricle was mildly dilated  Ejection fraction was estimated to be 25 % to 30%  There was diffuse hypokinesis with svere hypkinesis of the septum and inferior wall  The septum is thinned out  RIGHT VENTRICLE:  The ventricle was mildly dilated  Systolic function was mildly reduced  Estimated peak pressure was at least 30 mmHg  LEFT ATRIUM:  The atrium was mildly to moderately dilated      RIGHT ATRIUM:  The atrium was dilated  TRICUSPID VALVE:  There was trace regurgitation  HISTORY: PRIOR HISTORY: Risk factors: elevated troponin, hypercholesterolemia and a history of current cigarette use (within the last month)  PROCEDURE: The procedure was performed in the emergency room  This was a routine study  The transthoracic approach was used  The study included complete 2D imaging, M-mode, complete spectral Doppler, and color Doppler  The heart rate was  87 bpm, at the start of the study  Images were obtained from the parasternal, apical, subcostal, and suprasternal notch acoustic windows  Echocardiographic views were limited due to lung interference  Image quality was adequate  LEFT VENTRICLE: The ventricle was mildly dilated  Ejection fraction was estimated to be 25 % to 30%  There was diffuse hypokinesis with svere hypkinesis of the septum and inferior wall  The septum is thinned out  RIGHT VENTRICLE: The ventricle was mildly dilated  Systolic function was mildly reduced  Wall thickness was normal  DOPPLER: Estimated peak pressure was at least 30 mmHg  LEFT ATRIUM: The atrium was mildly to moderately dilated  RIGHT ATRIUM: The atrium was dilated  MITRAL VALVE: Valve structure was normal  There was normal leaflet separation  DOPPLER: The transmitral velocity was within the normal range  There was no evidence for stenosis  There was no regurgitation  AORTIC VALVE: The valve was not well visualized  DOPPLER: There was no evidence for stenosis  TRICUSPID VALVE: The valve structure was normal  There was normal leaflet separation  DOPPLER: The transtricuspid velocity was within the normal range  There was no evidence for stenosis  There was trace regurgitation  PULMONIC VALVE: Leaflets exhibited normal thickness, no calcification, and normal cuspal separation  DOPPLER: The transpulmonic velocity was within the normal range  There was no regurgitation  PERICARDIUM: There was no pericardial effusion   The pericardium was normal in appearance  AORTA: The root exhibited normal size  SYSTEM MEASUREMENT TABLES    2D  %FS: 24 2 %  Ao Diam: 2 5 cm  EDV(Teich): 146 1 ml  EF(Teich): 47 6 %  ESV(Teich): 76 5 ml  IVSd: 1 cm  LA Area: 21 5 cm2  LA Diam: 4 4 cm  LVEDV MOD A4C: 202 1 ml  LVEF MOD A4C: 48 9 %  LVESV MOD A4C: 103 3 ml  LVIDd: 5 5 cm  LVIDs: 4 2 cm  LVLd A4C: 10 8 cm  LVLs A4C: 9 6 cm  LVPWd: 1 cm  RA Area: 22 cm2  RVIDd: 4 7 cm  SV MOD A4C: 98 8 ml  SV(Teich): 69 6 ml    CW  TR Vmax: 2 8 m/s  TR maxP 6 mmHg    MM  TAPSE: 2 1 cm    PW  E': 0 1 m/s  E/E': 8 9  MV A Reese: 0 7 m/s  MV Dec Tensas: 2 9 m/s2  MV DecT: 271 7 ms  MV E Reese: 0 8 m/s  MV E/A Ratio: 1 1  MV PHT: 78 8 ms  MVA By PHT: 2 8 cm2    [facility]    Prepared and electronically signed by    Tony Craven MD  Signed 07-Mar-2019 17:25:06       Results for orders placed during the hospital encounter of 19   [place]    Narrative [address] [place] [id number]  [phone]    Transesophageal Echocardiogram  Limited 2D, Doppler, and Color Doppler    Study date:  11-Mar-2019    Patient: Niall Fam  MR number: [id number]  Account number: [de-identified]  : 1980  Age: 46 years  Gender: Male  Status: Inpatient  Location: Cath lab  Height: 72 in  Weight: 224 lb  BP: 118/ 69 mmHg    Indications: Heart failure    Diagnoses: I50 9 - Heart failure, unspecified    Sonographer:  Shyann Harper RDCS  Referring Physician:  Gentry Kaur MD  Group:  [facility]  RN:  Qi Zabala  Interpreting Physician:  Gentry Kaur MD    SUMMARY    LEFT VENTRICLE:  Systolic function was mildly to moderately reduced  MITRAL VALVE:  There was mild regurgitation  There was no echocardiographic evidence of vegetation  AORTIC VALVE:  There was no echocardiographic evidence of vegetation      TRICUSPID VALVE:  There was trace regurgitation  There was no echocardiographic evidence of vegetation  AORTA:  There was mild atheroma in the mid descending aorta  HISTORY: PRIOR HISTORY: Risk factors: altered mental status, pulmonary embolism, elevated troponin, bacteremia, hypercholesterolemia, a history of current cigarette use (within the last month), and a family history of coronary artery  disease  PROCEDURE: The procedure was performed in the catheterization laboratory  This was a routine study  The risks and alternatives of the procedure were explained to the patient and informed consent was obtained  The transesophageal approach  was used  The study included limited 2D imaging, limited spectral Doppler, and color Doppler  The heart rate was 75 bpm, at the start of the study  An adult omniplane probe was inserted by the attending cardiologist  Intubated with ease  One intubation attempt(s)  There was no blood detected on the probe  Image quality was adequate  There were no complications during the procedure  LEFT VENTRICLE: Size was normal  Systolic function was mildly to moderately reduced  Wall thickness was normal     RIGHT VENTRICLE: The size was normal  Systolic function was normal  Wall thickness was normal     LEFT ATRIUM: Size was normal  No thrombus was identified  RIGHT ATRIUM: Size was normal  No thrombus was identified  MITRAL VALVE: Valve structure was normal  There was normal leaflet separation  There was no echocardiographic evidence of vegetation  DOPPLER: There was mild regurgitation  AORTIC VALVE: The valve was trileaflet  Leaflets exhibited normal thickness and normal cuspal separation  There was no echocardiographic evidence of vegetation  DOPPLER: There was no regurgitation  TRICUSPID VALVE: The valve structure was normal  There was normal leaflet separation  There was no echocardiographic evidence of vegetation  DOPPLER: There was trace regurgitation      PULMONIC VALVE: Leaflets exhibited normal thickness, no calcification, and normal cuspal separation  There was no echocardiographic evidence of vegetation  PERICARDIUM: There was no pericardial effusion  The pericardium was normal in appearance  AORTA: The root exhibited normal size  There was no atheroma  There was mild atheroma in the mid descending aorta  There was no evidence for dissection  There was no evidence for aneurysm  [address] Accredited Echocardiography Laboratory    Prepared and electronically signed by    Jad Figueroa MD  Signed 11-Mar-2019 18:02:02       No results found for this or any previous visit  No procedure found  No results found for this or any previous visit  Meds/Allergies   all current active meds have been reviewed  Medications Prior to Admission   Medication    rivaroxaban (XARELTO) 10 mg tablet         No current facility-administered medications for this encounter  Assessment/Plan:  1- NSTEMI/coronary disease, chronic total occlusion of LAD with collaterals from RCA, 90% lesion mid RCA  Troponins peaked at 4 72  Continue aspirin, statin, beta-blocker  Discussed patient with Dr Stubbs  Patient [facility] be [facility]     2-  ischemic cardiomyopathy with EF of 25-30%  Euvolemic  Continue with beta-blocker, ACE-inhibitor     3- Staph aureus bacteremia  Her evidence of endocarditis on transesophageal echocardiogram   Continue with antibiotics     4- Pulmonary embolism  On Xarelto          Counseling / Coordination of Care  Total floor / unit time spent today 25 minutes  Greater than 50% of total time was spent with the patient and / or family counseling and / or coordination of care  ** Please Note: Dragon 360 Dictation voice to text software may have been used in the creation of this document   **

## 2019-03-13 NOTE — PROGRESS NOTES
Pt noted to have R sided facial swelling, encompassing cheek and lip  Pt not complaining of any pain, stating he felt it starting to feel weird  Neuro exam WNL, VS stable  On call SLIM made aware, orders received for IV benadryl  Pt resting comfortably in bed  Will continue to monitor     Thee Payne RN  9:09 PM  03/12/19  ========================================================

## 2019-03-13 NOTE — UTILIZATION REVIEW
Notification of Discharge  This is a Notification of Discharge from our facility [address]  Please be advised that this patient has been discharge from our facility  Below you will find the admission and discharge date and time including the patients disposition  PRESENTATION DATE: 3/6/2019  9:31 PM  IP ADMISSION DATE: 3/7/19 0014  DISCHARGE DATE: 3/12/2019  9:37 PM  DISPOSITION: [facility] Review Department  Phone: [phone]; Fax [phone]  [email or web address]  ATTENTION: Please call with any questions or concerns to [phone]  and carefully listen to the prompts so that you are directed to the right person  Send all requests for admission clinical reviews, approved or denied determinations and any other requests to fax [phone]   All voicemails are confidential

## 2019-03-13 NOTE — CONSULTS
Heart Failure Consult Note - Ariella Wells 46 y o  male MRN: [id number]    Unit/Bed#: [facility] 412-01 Encounter: [id number]    Assessment:  Principal Problem:    NSTEMI (non-ST elevated myocardial infarction) ([facility] )  Active Problems:    Closed traumatic nondisplaced fracture of one rib of right side    Acute subsegmental PE of LLE    Tobacco abuse    Bacteremia due to Staphylococcus aureus (MSSA)    Hepatitis C    TSH elevation    Hyperlipidemia    Essential hypertension    Ischemic cardiomyopathy (severe EF 25%)    CAD (coronary artery disease)    Polysubstance abuse ([facility] )    On continuous oral anticoagulation    Transaminitis    HPI:   Katie Spaulding is 43-year-old male with a PMH of HLD, HTN, current tobacco use, and history of IV drug use who presented to [facility] on 03/06/2019 with AMS  He was found to have an elevated troponin (peaked at 4 72) and evidence of PE on CTA of chest  He was placed on a heparin drip and has now transitioned to Xarelto  Additionally blood cultures were positive for MSSA and is receiving IV cefazolin  COLEMAN was completed, revealing an LVEF of 25% and without vegetation  Also underwent cardiac catheterization on 03/12/19 that found 90% stenosis in the proximal LAD, 100% stenosis in mid LAD (chronic total occlusion), 80% stenosis in ramus intermedius, and 90% stenosis in mid RCA  He then was transferred to [facility] for CT surgery evaluation  He reports having occasional BENSON with activities like shoveling snow  Denies chest pain, palpitations, PND, orthopnea, bendopnea, and swelling in LE  Family history significant for high cholesterol, father with MI at age 27; uncle with MI at 34  He reports being on Lipitor, Zetia, Welchol (total of "27 pills daily") for HLD in the past (started on meds for HLD his 20s); has not taken for the past 2 years due to losing his job/insurance       Current every day smoker, (1/4 pack daily for 20 years), 1-2 weeks alcoholic drinks per week; and reports history of IV heroin use (reports last use in June or July of 2018)  Denied stimulant u and weight loss medication use  Of note, upon arriving to the ER at [facility], he tested positive for amphetamines and opiates  Also was found to be positive for Hepatitis C  Heart failure was consulted for "ischemic cardiomyopathy " He saw Dr King Card at [facility] (once in 02/2014) for outpatient cardiology; reports seeing another cardiologist prior to Dr King Card  Plan:    Ischemic cardiomyopathy   LVEF of 25-30% on TTE from 03/11/2019  LHC from 03/12/2019: "Proximal LAD: Lesion 1: tubular, 90% stenosis  Mid LAD: Lesion 1: 100% stenosis  Ramus intermedius: Lesion 1: discrete, 80% stenosis  Mid RCA: Lesion 1: tubular, 90% stenosis "   Lisinopril 2 5 mg PO QD and metoprolol succinate 25 mg PO QD ordered  Did not receive doses this AM due SBP and heart rate in 50s  Coronary artery disease    [facility] as above  CT surgery consulted  Continue aspirin and statin  Acute subsegmental PE of LLE   Continue on Xarelto for [facility]  MSSA bacteremia   Positive blood culture from 03/06/2019  Repeat blood culture on 03/08/2019 with no growth  COLEMAN with no vegetation on 03/11/2019  IV antibiotics per infectious disease  Drug use   Patient reports last using heroin in June or July of 2018  Tested positive for amphetamines and opiates in ED on 03/06/2019  Referred for inpatient rehab for "heroin addiction" at [facility] ED in February 2016  Per ED notes, began using heroin some time in 2013  Preferred snorting over IV use at that time  Seen at [facility] ED in December 2018 for accidental heroin overdose and head injury      Past Medical History:   Diagnosis Date    CAD (coronary artery disease)     Drug use     heroin, amphetamines, opiates    Former tobacco use     Hepatitis C     Hyperlipidemia     Hypertension     Pulmonary embolus ([address] )     Rib fracture     right lateral 7th rib     Review of Systems:  Constitutional: Negative for fevers, chills, weight change,  HEENT: Negative for rhinorrhea, sore throat, changes in vision  Respiratory: Negative for cough, hemoptysis, PND, orthopnea  Positive for BENSON  Cardiovascular: Negative for current chest pain, syncope, palpations  GI: Negative for n/v/d   : Negative for dysuria and difficulty urinating  Musculoskeletal: Negative for joint pain/swelling and LE swelling  Neurological: Negative for memory loss, lightheadedness, and dizziness  12-point ROS is negative unless stated above or in HPI  Diagnostic Testing:  [facility] from 03/12/2019:    "Left main: Normal    Proximal LAD: tubular 90% stenosis  Mid LAD: 100% stenosis; chronic total occlusion  Distal LAD: moderate atherosclerosis  Distal vessel was supplied by limited collaterals  1st diagonal: normal sized; minor luminal irregularities  Circumflex: medium sized (co-dominant); mild atherosclerosis  1st obtuse marginal: normal sized; minor luminal irregularities  1st left posterolateral: normal sized; minor luminal irregularities  Ramus intermedius: normal sized; minor luminal irregularities  Discrete 80% stenosis  RCA: medium sized (co-dominant); mild atherosclerosis  Mid RCA: tubular 90% stenosis  Right PDA: normal sized; mild atherosclerosis "    COLEMAN from 03/11/2019:   LV: Normal size  Mild to moderately reduced systolic function  LVIDd:  RV: Normal size and function  MR: Mild  PASP:  RVOT:   Other: No vegetation  EKG from 03/08/2019: "Sinus tachycardia with PVCs  Evidence of anterolateral infarct "    TTE from 03/07/2019:   LVEF: 25-30%; mildly dilated  Diffuse hypokinesis  Severe hypokinesis of septum  Septum thinned out  LVIDd: 5 5 cm  RV: Mildly dilated  Mildly reduced systolic function  MR: None  PASP:  RVOT:   Other: Dilated LA and RA  CTA chest PE study from 03/06/2019: "Subsegmental branch left lower lobe pulmonary emboli   Diffuse airspace opacities within the right upper lobe and left lung  Findings may represent multifocal pneumonia versus pulmonary edema  Right lateral 7th rib nondisplaced fracture "    CXR from 03/06/2019: "Multifocal airspace opacities are better appreciated on subsequent CT "    No Known Allergies    Current Facility-Administered Medications:     aspirin chewable tablet 81 mg, 81 mg, Oral, Daily, Michael Dia MD, 81 mg at 03/13/19 0858    atorvastatin (LIPITOR) tablet 80 mg, 80 mg, Oral, Daily With Sin Tariq MD    ceFAZolin (ANCEF) 2,000 mg in sodium chloride 0 9 % 50 mL IVPB, 2,000 mg, Intravenous, Q8H, Michael Dia MD, Last Rate: 100 mL/hr at 03/13/19 0903, 2,000 mg at 03/13/19 8388    guaiFENesin (MUCINEX) 12 hr tablet 600 mg, 600 mg, Oral, Q12H PRN, Estefany Luis MD    lisinopril (ZESTRIL) tablet 2 5 mg, 2 5 mg, Oral, Daily, Estefany Luis MD, Stopped at 03/13/19 8069    metoprolol succinate (TOPROL-XL) 24 hr tablet 25 mg, 25 mg, Oral, Daily, Nayla Jane DO, Stopped at 03/13/19 0816    nicotine (NICODERM CQ) 7 mg/24hr TD 24 hr patch 7 mg, 7 mg, Transdermal, Daily, Michael Dia MD, 7 mg at 03/13/19 0901    rivaroxaban (XARELTO) tablet 15 mg, 15 mg, Oral, BID With Meals, Michael Dia MD, 15 mg at 03/13/19 0806    Social History     Socioeconomic History    Marital status: Single     Spouse name: Not on file    Number of children: Not on file    Years of education: Not on file    Highest education level: Not on file   Occupational History    Not on file   Social Needs    Financial resource strain: Not on file    Food insecurity:     Worry: Not on file     Inability: Not on file    Transportation needs:     Medical: Not on file     Non-medical: Not on file   Tobacco Use    Smoking status: Current Every Day Smoker     Packs/day: 0 25     Types: Cigarettes    Smokeless tobacco: Never Used   Substance and Sexual Activity    Alcohol use:  Yes     Alcohol/week: 1 8 oz     Types: 3 Cans of beer per week     Frequency: 2-4 times a month     Drinks per session: 1 or 2     Binge frequency: Less than monthly    Drug use: Not Currently     Types: Heroin     Comment: 6 months     Sexual activity: Not on file   Lifestyle    Physical activity:     Days per week: Not on file     Minutes per session: Not on file    Stress: Not on file   Relationships    Social connections:     Talks on phone: Not on file     Gets together: Not on file     Attends Rastafarian service: Not on file     Active member of club or organization: Not on file     Attends meetings of clubs or organizations: Not on file     Relationship status: Not on file    Intimate partner violence:     Fear of current or ex partner: Not on file     Emotionally abused: Not on file     Physically abused: Not on file     Forced sexual activity: Not on file   Other Topics Concern    Not on file   Social History Narrative    Not on file     Family History   Problem Relation Age of Onset    Coronary artery disease Father     Hyperlipidemia Father      Physical Exam:  Vitals: Blood pressure 104/61, pulse (!) 52, temperature 97 9 °F (36 6 °C), temperature source Oral, resp  rate 18, weight 98 6 kg (217 lb 4 8 oz), SpO2 98 %  , Body mass index is 29 47 kg/m² , I/O last 3 completed shifts: In: 120 [P O :120]  Out: -   I/O this shift: In: 48 [IV Piggyback:50]  Out: -   Wt Readings from Last 3 Encounters:   03/13/19 98 6 kg (217 lb 4 8 oz)   03/07/19 102 kg (224 lb)     GEN: Marlee Petersen appears well, alert and oriented x 3, pleasant and cooperative   HEENT: pupils equal, round, and reactive to light; extraocular muscles intact  NECK: supple, no carotid bruits   HEART: regular rhythm, slower rate, normal S1 and S2, no clicks, gallops, or rubs, JVP is down  Murmur present    LUNGS: clear to auscultation bilaterally; no wheezes, rales, or rhonchi   ABDOMEN: normal bowel sounds, soft, no tenderness, no distention  EXTREMITIES: peripheral pulses normal; no clubbing, cyanosis, or edema  Diaphoretic LE   NEURO: no focal findings   SKIN: normal without suspicious lesions on exposed skin    Intake/Output Summary (Last 24 hours) at 3/13/2019 1215  Last data filed at 3/13/2019 0903  Gross per 24 hour   Intake 170 ml   Output    Net 170 ml     I/O last 3 completed shifts: In: 120 [P O :120]  Out: -     Vitals:    03/13/19 0301 03/13/19 0503 03/13/19 0600 03/13/19 0700   BP: 96/52   104/61   BP Location: Right arm   Left arm   Pulse: (!) 47   (!) 52   Resp: 18   18   Temp: 98 °F (36 7 °C)   97 9 °F (36 6 °C)   TempSrc: Oral   Oral   SpO2: 99%   98%   Weight:  98 6 kg (217 lb 4 8 oz) 98 6 kg (217 lb 4 8 oz)      Central Line (day, reason):  N/A  Jensen catheter (day, reason): N/A    Labs & Results:  Results from last 7 days   Lab Units 03/07/19  0944 03/07/19  0634 03/07/19  0151 03/06/19  2141   CK TOTAL U/L  --   --   --  1,281*   TROPONIN I ng/mL 4 68* 4 72* 2 48* 0 12*   CK MB INDEX %  --   --   --  <1 0     Results from last 7 days   Lab Units 03/13/19  0530 03/12/19  0503 03/11/19  0512   WBC Thousand/uL 8 22 8 02 6 66   HEMOGLOBIN g/dL 13 6 13 7 13 9   HEMATOCRIT % 41 1 42 1 42 7   PLATELETS Thousands/uL 207 219 204         Results from last 7 days   Lab Units 03/13/19  0530 03/12/19  0503 03/11/19  0512  03/06/19  2148 03/06/19  2146  03/06/19  2141   POTASSIUM mmol/L 3 8 4 3 4 0   < >  --   --   --  3 5   CHLORIDE mmol/L 107 106 105   < >  --   --   --  98*   CO2 mmol/L 25 25 24   < >  --   --   --  25   CO2, I-STAT mmol/L  --   --   --   --  27 26   < >  --    BUN mg/dL 8 10 8   < >  --   --   --  13   CREATININE mg/dL 0 65 0 69 0 63   < >  --   --   --  1 29   CALCIUM mg/dL 8 5 8 6 8 6   < >  --   --   --  8 8   ALK PHOS U/L  --  64  --   --   --   --   --  82   ALT U/L  --  124*  --   --   --   --   --  119*   AST U/L  --  82*  --   --   --   --   --  108*   GLUCOSE, ISTAT mg/dl  --   --   --   --  91 93  --   --     < > = values in this interval not displayed  Results from last 7 days   Lab Units 03/11/19  0513 03/06/19  2141   INR  1 23* 1 15     Counseling / Coordination of Care: Total floor / unit time spent today 40 minutes  Greater than 50% of total time was spent with the patient and / or family counseling and / or coordination of care  A description of the counseling / coordination of care: 20  Thank you for the opportunity to participate in the care of this patient      Zarina Awan PA-C

## 2019-03-14 LAB
ANION GAP SERPL CALCULATED.3IONS-SCNC: 7 MMOL/L (ref 4–13)
BACTERIA BLD CULT: NORMAL
BASOPHILS # BLD AUTO: 0.07 THOUSANDS/ΜL (ref 0–0.1)
BASOPHILS NFR BLD AUTO: 1 % (ref 0–1)
BUN SERPL-MCNC: 8 MG/DL (ref 5–25)
CALCIUM SERPL-MCNC: 8.8 MG/DL (ref 8.3–10.1)
CHLORIDE SERPL-SCNC: 106 MMOL/L (ref 100–108)
CO2 SERPL-SCNC: 27 MMOL/L (ref 21–32)
CREAT SERPL-MCNC: 0.63 MG/DL (ref 0.6–1.3)
EOSINOPHIL # BLD AUTO: 0.45 THOUSAND/ΜL (ref 0–0.61)
EOSINOPHIL NFR BLD AUTO: 6 % (ref 0–6)
ERYTHROCYTE [DISTWIDTH] IN BLOOD BY AUTOMATED COUNT: 13.7 % (ref 11.6–15.1)
F2 GENE MUT ANL BLD/T: NORMAL
FERRITIN SERPL-MCNC: 173 NG/ML (ref 8–388)
GFR SERPL CREATININE-BSD FRML MDRD: 125 ML/MIN/1.73SQ M
GLUCOSE SERPL-MCNC: 99 MG/DL (ref 65–140)
HCT VFR BLD AUTO: 43.2 % (ref 36.5–49.3)
HCV RNA SERPL NAA+PROBE-ACNC: NORMAL IU/ML
HCV RNA SERPL NAA+PROBE-LOG IU: 6.46 LOG10 IU/ML
HGB BLD-MCNC: 14.4 G/DL (ref 12–17)
IMM GRANULOCYTES # BLD AUTO: 0.08 THOUSAND/UL (ref 0–0.2)
IMM GRANULOCYTES NFR BLD AUTO: 1 % (ref 0–2)
LYMPHOCYTES # BLD AUTO: 2.2 THOUSANDS/ΜL (ref 0.6–4.47)
LYMPHOCYTES NFR BLD AUTO: 29 % (ref 14–44)
MCH RBC QN AUTO: 30.6 PG (ref 26.8–34.3)
MCHC RBC AUTO-ENTMCNC: 33.3 G/DL (ref 31.4–37.4)
MCV RBC AUTO: 92 FL (ref 82–98)
MONOCYTES # BLD AUTO: 0.84 THOUSAND/ΜL (ref 0.17–1.22)
MONOCYTES NFR BLD AUTO: 11 % (ref 4–12)
MRSA NOSE QL CULT: NORMAL
NEUTROPHILS # BLD AUTO: 3.89 THOUSANDS/ΜL (ref 1.85–7.62)
NEUTS SEG NFR BLD AUTO: 52 % (ref 43–75)
NRBC BLD AUTO-RTO: 0 /100 WBCS
PLATELET # BLD AUTO: 224 THOUSANDS/UL (ref 149–390)
PMV BLD AUTO: 12.2 FL (ref 8.9–12.7)
POTASSIUM SERPL-SCNC: 3.9 MMOL/L (ref 3.5–5.3)
RBC # BLD AUTO: 4.7 MILLION/UL (ref 3.88–5.62)
SODIUM SERPL-SCNC: 140 MMOL/L (ref 136–145)
TEST INFORMATION: NORMAL
WBC # BLD AUTO: 7.53 THOUSAND/UL (ref 4.31–10.16)

## 2019-03-14 PROCEDURE — 99232 SBSQ HOSP IP/OBS MODERATE 35: CPT | Performed by: INTERNAL MEDICINE

## 2019-03-14 PROCEDURE — C1751 CATH, INF, PER/CENT/MIDLINE: HCPCS

## 2019-03-14 PROCEDURE — 82728 ASSAY OF FERRITIN: CPT | Performed by: PHYSICIAN ASSISTANT

## 2019-03-14 PROCEDURE — 05HY33Z INSERTION OF INFUSION DEVICE INTO UPPER VEIN, PERCUTANEOUS APPROACH: ICD-10-PCS | Performed by: INTERNAL MEDICINE

## 2019-03-14 PROCEDURE — 80048 BASIC METABOLIC PNL TOTAL CA: CPT | Performed by: INTERNAL MEDICINE

## 2019-03-14 PROCEDURE — 86038 ANTINUCLEAR ANTIBODIES: CPT | Performed by: PHYSICIAN ASSISTANT

## 2019-03-14 PROCEDURE — 85025 COMPLETE CBC W/AUTO DIFF WBC: CPT | Performed by: INTERNAL MEDICINE

## 2019-03-14 PROCEDURE — 36569 INSJ PICC 5 YR+ W/O IMAGING: CPT

## 2019-03-14 RX ORDER — LISINOPRIL 2.5 MG/1
2.5 TABLET ORAL DAILY
Status: DISCONTINUED | OUTPATIENT
Start: 2019-03-14 | End: 2019-03-22 | Stop reason: HOSPADM

## 2019-03-14 RX ADMIN — CEFAZOLIN SODIUM 2000 MG: 10 INJECTION, POWDER, FOR SOLUTION INTRAVENOUS at 18:09

## 2019-03-14 RX ADMIN — RIVAROXABAN 15 MG: 15 TABLET, FILM COATED ORAL at 08:32

## 2019-03-14 RX ADMIN — RIVAROXABAN 15 MG: 15 TABLET, FILM COATED ORAL at 17:58

## 2019-03-14 RX ADMIN — CEFAZOLIN SODIUM 2000 MG: 10 INJECTION, POWDER, FOR SOLUTION INTRAVENOUS at 08:33

## 2019-03-14 RX ADMIN — ATORVASTATIN CALCIUM 80 MG: 80 TABLET, FILM COATED ORAL at 17:57

## 2019-03-14 RX ADMIN — LISINOPRIL 2.5 MG: 2.5 TABLET ORAL at 08:44

## 2019-03-14 RX ADMIN — ASPIRIN 81 MG 81 MG: 81 TABLET ORAL at 08:32

## 2019-03-14 RX ADMIN — NICOTINE 7 MG: 7 PATCH TRANSDERMAL at 08:33

## 2019-03-14 RX ADMIN — CEFAZOLIN SODIUM 2000 MG: 10 INJECTION, POWDER, FOR SOLUTION INTRAVENOUS at 00:57

## 2019-03-14 NOTE — PROGRESS NOTES
Heart Failure Service Progress Note - Neela Chen 46 y o  male MRN: [id number]    Unit/Bed#: [facility] 412-01 Encounter: [id number]      Assessment:    Principal Problem:    NSTEMI (non-ST elevated myocardial infarction) ([facility] )  Active Problems:    Closed traumatic nondisplaced fracture of one rib of right side    Acute subsegmental PE of LLE    Tobacco abuse    Bacteremia due to Staphylococcus aureus (MSSA)    Hepatitis C    TSH elevation    Hyperlipidemia    Essential hypertension    Ischemic cardiomyopathy (severe EF 25%)    CAD (coronary artery disease)    Polysubstance abuse ([facility] )    On continuous oral anticoagulation    Transaminitis    # Newly diagnosed HFrEF, LVEF 25-30%, LVIDd 5 5 cm, NYHA II/III, ACC/AHA Stage C: Most likely driven by iCM given severe CAD as noted below  Possible component of toxin induced (+ meth on admission, but states this is first time)  Less likely stress and/or myocarditis  Will proceed with the following:  Diag:  --TTE 3/7/19: LVEF 25-30%, LVIDd 5 5 cm  Mildly reduced RVSF w/ mild RVE  LAE>GAIL  Trace TR  No significant valvular disease  --COLEMAN 3/11/19: No clear vegetations noted  --LHC: 90% prox LAD  100% Mid LAD  w/ R->L collaterals  80% prox RI  90% midRCA    --CT PE 3/6/19: Subsegmental LL PE       To do:  --TSH ok, ferritin, HIV neg, LISA     Therapeutic:  --2g sodium diet  --2000 ml fluid restriction  --CT surgery evaluation ongoing; likely CABG as outpatient given bacteremia, acute PE, and active drug use   Will monitor closely as outpatient      Neurohormonal Blockade:  --Beta-Blocker: HR in 50s, which precludes BB therapy at this time  --ACEi, ARB or ARNi: Start lisinopril 2 5 mg PO daily  --Aldosterone Receptor Blocker: Start spironolactone 12 5 mg PO daily tomorrow if SBP>100 mmHg  --Diuretic: Currently not requiring diuretics     Sudden Cardiac Death Risk Reduction:  --ICD: Given iCM, would favor LifeVest on D/C if patient is agreeable unless he has revascularization during this admission     Electrical Resynchronization:  --Candidacy for BiV device: Narrow QRS     Advanced Therapies: Will continue to monitor  Given recent heroin/meth abuse, not currently a candidate      # MSSA Bacteremia: Currently on Abx  No e/o endocarditis on COLEMAN  # Severe 3v CAD: 90% prox LAD  100% Mid LAD  w/ R->L collaterals  80% prox RI  90% midRCA  # LLL subsegmental PE: Continue Xarelto for now; but can consider transition to Lovenox vs hep gtt given ongoing CT surgery workup  # HLD: Per cardiology    Subjective:   Patient seen and examined  No significant events overnight  Objective:     [employer] (day, reason): Jensen catheter (day, reason):    Vitals: Blood pressure 106/66, pulse 67, temperature 97 5 °F (36 4 °C), temperature source Oral, resp  rate 18, height 6' (1 829 m), weight 98 2 kg (216 lb 6 4 oz), SpO2 97 %  , Body mass index is 29 35 kg/m² , I/O last 3 completed shifts: In: 6731 [P O :1420; IV Piggyback:50]  Out: 875 [Urine:875]  No intake/output data recorded  Wt Readings from Last 3 Encounters:   03/14/19 98 2 kg (216 lb 6 4 oz)   03/07/19 102 kg (224 lb)       Intake/Output Summary (Last 24 hours) at 3/14/2019 0826  Last data filed at 3/14/2019 0600  Gross per 24 hour   Intake 1350 ml   Output 875 ml   Net 475 ml     I/O last 3 completed shifts: In: 4697 [P O :1420; IV Piggyback:50]  Out: 850 [Urine:875]    No significant arrhythmias seen on telemetry review        Physical Exam:  Vitals:    03/13/19 1900 03/13/19 2300 03/14/19 0300 03/14/19 0600   BP: 129/60 125/67 106/66    BP Location: Right arm Right arm Left arm    Pulse: 73 69 67    Resp: 18 18 18    Temp: 97 9 °F (36 6 °C) 98 2 °F (36 8 °C) 97 5 °F (36 4 °C)    TempSrc: Oral Oral Oral    SpO2: 93% 95% 97%    Weight:    98 2 kg (216 lb 6 4 oz)   Height:           GEN: Cheryl Becker appears well, alert and oriented x 3, pleasant and cooperative   HEENT: pupils equal, round, and reactive to light; extraocular muscles intact  NECK: supple, no carotid bruits   HEART: regular rhythm, normal S1 and S2, no murmurs, clicks, gallops or rubs, JVP is    LUNGS: clear to auscultation bilaterally; no wheezes, rales, or rhonchi   ABDOMEN: normal bowel sounds, soft, no tenderness, no distention  EXTREMITIES: peripheral pulses normal; no clubbing, cyanosis, or edema  NEURO: no focal findings   SKIN: normal without suspicious lesions on exposed skin      Current Facility-Administered Medications:     aspirin chewable tablet 81 mg, 81 mg, Oral, Daily, Lenny Sharp MD, 81 mg at 03/13/19 0858    atorvastatin (LIPITOR) tablet 80 mg, 80 mg, Oral, Daily With Abbi Zabala MD, 80 mg at 03/13/19 1754    ceFAZolin (ANCEF) 2,000 mg in sodium chloride 0 9 % 50 mL IVPB, 2,000 mg, Intravenous, Q8H, Lenny Sharp MD, Last Rate: 100 mL/hr at 03/14/19 0057, 2,000 mg at 03/14/19 0057    guaiFENesin (MUCINEX) 12 hr tablet 600 mg, 600 mg, Oral, Q12H PRN, Cheryl Rutledge MD    metoprolol succinate (TOPROL-XL) 24 hr tablet 12 5 mg, 12 5 mg, Oral, Daily, Nirmala Muse MD    nicotine (NICODERM CQ) 7 mg/24hr TD 24 hr patch 7 mg, 7 mg, Transdermal, Daily, Lenny Sharp MD, 7 mg at 03/13/19 0901    rivaroxaban (XARELTO) tablet 15 mg, 15 mg, Oral, BID With Meals, Lenny Sharp MD, 15 mg at 03/13/19 1754      Labs & Results:    Results from last 7 days   Lab Units 03/07/19  0944   TROPONIN I ng/mL 4 68*     Results from last 7 days   Lab Units 03/14/19  0446 03/13/19  0530 03/12/19  0503   WBC Thousand/uL 7 53 8 22 8 02   HEMOGLOBIN g/dL 14 4 13 6 13 7   HEMATOCRIT % 43 2 41 1 42 1   PLATELETS Thousands/uL 224 207 219         Results from last 7 days   Lab Units 03/14/19  0446 03/13/19  0530 03/12/19  0503   POTASSIUM mmol/L 3 9 3 8 4 3   CHLORIDE mmol/L 106 107 106   CO2 mmol/L 27 25 25   BUN mg/dL 8 8 10   CREATININE mg/dL 0 63 0 65 0 69   CALCIUM mg/dL 8 8 8 5 8 6   ALK PHOS U/L  --   --  64   ALT U/L  -- --  124*   AST U/L  --   --  82*     Results from last 7 days   Lab Units 03/11/19  0513   INR  1 23*     EKG personally reviewed by Tony Mir MD      Counseling / Coordination of Care  Total floor / unit time spent today 40 minutes  Greater than 50% of total time was spent with the patient and / or family counseling and / or coordination of care  A description of the counseling / coordination of care: 20 min  Thank you for the opportunity to participate in the care of this patient      Tony Mir MD, PhD   Teresa Churchill

## 2019-03-14 NOTE — PROCEDURES
Insert PICC line  Date/Time: 3/14/2019 9:06 AM  Performed by: Kenyon Cooper RN  Authorized by: Luke Read MD     Patient location:  Bedside  Consent:     Consent obtained:  Written (obtained by physician)  Universal protocol:     Procedure explained and questions answered to patient or proxy's satisfaction: yes      Relevant documents present and verified: yes      Test results available and properly labeled: yes      Radiology Images displayed and confirmed  If images not available, report reviewed: yes      Required blood products, implants, devices, and special equipment available: yes      Site/side marked: yes      Immediately prior to procedure, a time out was called: yes      Patient identity confirmed:  Verbally with patient and arm band  Pre-procedure details:     Hand hygiene: Hand hygiene performed prior to insertion      Sterile barrier technique: All elements of maximal sterile technique followed      Skin preparation:  ChloraPrep  Indications:     PICC line indications: long term antibiotics    Anesthesia (see MAR for exact dosages):      Anesthesia method:  Local infiltration    Local anesthetic:  Lidocaine 1% w/o epi (3ml)  Procedure details:     Location:  Basilic    Vessel type: vein      Laterality:  Right    Approach: percutaneous technique used      Patient position:  Flat    Procedural supplies:  Double lumen    Catheter size:  5 Fr    Landmarks identified: yes      Ultrasound guidance: yes      Sterile ultrasound techniques: Sterile gel and sterile probe covers were used      Number of attempts:  1    Successful placement: yes      Vessel of catheter tip end:  Sherlock 3CG confirmed (SVC)    Total catheter length (cm):  43    Catheter out on skin (cm):  0    Max flow rate:  999ml/hr    Arm circumference:  33  Post-procedure details:     Post-procedure:  Dressing applied and securement device placed    Assessment:  Blood return through all ports and free fluid flow    Patient tolerance of procedure:   Tolerated well, no immediate complications    Observer: Yes      Observer name:  Cyrus Duane, Inf Tech

## 2019-03-14 NOTE — PROGRESS NOTES
IM Residency Progress Note   Unit/Bed#: [facility] 412-01 Encounter: [id number]  SOD Team B       Lady Thompson 46 y o  male [id number]    Hospital Stay Days: 2      Assessment/Plan:    Principal Problem:    NSTEMI (non-ST elevated myocardial infarction) [facility])  Active Problems:    Closed traumatic nondisplaced fracture of one rib of right side    Acute subsegmental PE of LLE    Tobacco abuse    Bacteremia due to Staphylococcus aureus (MSSA)    Hepatitis C    TSH elevation    Hyperlipidemia    Essential hypertension    Ischemic cardiomyopathy (severe EF 25%)    CAD (coronary artery disease)    Polysubstance abuse ([address] )    On continuous oral anticoagulation    Transaminitis    1  NSTEMI  ·  Cardiac catheterization on March 12th revealed a 90% occlusion of the proximal LAD, a 100% occlusion of the mid LAD, and 90% occlusion of the mid RCA  The patient has a family history significant for familial hypercholesterolemia in both his father and his uncle  The patient currently denies chest pain, shortness of breath, diaphoresis  · Monitor on telemetry  · Metoprolol succinate 25 mg daily  · Atorvastatin 80 mg daily  · Aspirin 81 mg daily  · Lisinopril 2 5 mg daily  · CT surgery recommending outpatient CABG after completion of IV antibiotic     2  Acute Subsegmental PE of LLL  · ·CT PE study at [facility] [place] revealed a left lower lobe subsegmental pulmonary embolism  The patient was thus started on heparin drip  He was later transitioned to Xarelto  The patient currently denies chest pain, shortness of breath, syncope  The patient is currently saturating well on room air  · Xarelto 15 mg BID  · Continue to monitor clinically     3  MSSA Bacteremia   The patient has a history of IV drug abuse however states that he has been abstinent since June of 2018  Urinary drug screen was positive for methamphetamine and opiates  Blood cultures on March 6th became positive for MSSA    Repeat blood cultures on March 8th were negative  Chad negative for vegetation  The patient was started on IV cefazolin per ID and will continue therapy until   ID states that the patient is appropriate for PICC line placement  · Cefazolin 2000 mg IV q8 hours until 3/22/2019  · PICC consult placed  · Monitor fever curves and WBC count     4  Essential Hypertension  · Systolic blood pressures in the low 100s  · Lisinopril 2 5 mg daily   · Continue cardiopulmonary monitoring    5  Hyperlipidemia  · Patient with a family history of familial hypercholesterolemia in his father and his uncle  Patient appears to be on atorvastatin 10 mg at home  · Increase Atorvastatin to 80 mg daily  · Check lipid panel     6  Ischemic Cardiomyopathy with an EF of 25%  Echocardiogram on  revealed an ejection fraction of 25-30% with diffuse hypokinesis of the septum and inferior wall  Likely ischemic in nature secondary to NSTEMI  Physical exam unremarkable for signs of volume overload  The patient is saturating well on room air  Continue medical management as above  · Monitor for signs of volume overload  · Will require life vest prior to discharge, will coordinate with heart failure           Disposition:  Possible discharge in the next 24-48 hours after LifeVest and PICC line placement if patient is able to get a facility to administer antibiotics      Subjective:   Pt seen and examined  No acute overnight events      No acute complaints current       Vitals: Temp (24hrs), Av 9 °F (36 6 °C), Min:97 5 °F (36 4 °C), Max:98 2 °F (36 8 °C)  Current: Temperature: 97 5 °F (36 4 °C)  Vitals:    19 1900 19 2300 19 0300 19 0600   BP: 129/60 125/67 106/66    BP Location: Right arm Right arm Left arm    Pulse: 73 69 67    Resp: 18 18 18    Temp: 97 9 °F (36 6 °C) 98 2 °F (36 8 °C) 97 5 °F (36 4 °C)    TempSrc: Oral Oral Oral    SpO2: 93% 95% 97%    Weight:    98 2 kg (216 lb 6 4 oz)   Height:        Body mass index is 29 35 kg/m²  I/O last 24 hours: In: 18 [P O :1300; IV Piggyback:50]  Out: 883 [Urine:875]      Physical Exam:         Physical Exam   Constitutional: He is oriented to person, place, and time  He appears well-developed and well-nourished  No distress  HENT:   Head: Normocephalic and atraumatic  Nose: Nose normal    Mouth/Throat: No oropharyngeal exudate  Eyes: Conjunctivae are normal  No scleral icterus  Neck: Normal range of motion  Neck supple  Cardiovascular: Normal rate, regular rhythm, normal heart sounds and intact distal pulses  Exam reveals no gallop and no friction rub  No murmur heard  Pulmonary/Chest: Effort normal and breath sounds normal  No respiratory distress  He has no wheezes  He has no rales  He exhibits no tenderness  Abdominal: Soft  Bowel sounds are normal  He exhibits no distension  There is no tenderness  There is no guarding  Musculoskeletal: Normal range of motion  He exhibits no edema, tenderness or deformity  Neurological: He is alert and oriented to person, place, and time  Skin: Skin is warm and dry  He is not diaphoretic  No erythema  Psychiatric: He has a normal mood and affect         Invasive Devices     Peripheral Intravenous Line            Peripheral IV 03/14/19 Right Antecubital less than 1 day                          Labs:   Recent Results (from the past 24 hour(s))   Type and screen    Collection Time: 03/13/19  1:34 PM   Result Value Ref Range    ABO Grouping A     Rh Factor Positive     Antibody Screen Negative     Specimen Expiration Date [id number]    Hemoglobin A1C    Collection Time: 03/13/19  1:34 PM   Result Value Ref Range    Hemoglobin A1C 6 0 4 2 - 6 3 %     mg/dl   UA w Reflex to Microscopic w Reflex to Culture    Collection Time: 03/13/19  3:00 PM   Result Value Ref Range    Color, UA Yellow     Clarity, UA Clear     Specific [place], UA 1 012 1 003 - 1 030    pH, UA 7 0 4 5, 5 0, 5 5, 6 0, 6 5, 7 0, 7 5, 8 0    Leukocytes, UA Negative Negative    Nitrite, UA Negative Negative    Protein, UA Negative Negative mg/dl    Glucose, UA Negative Negative mg/dl    Ketones, UA Negative Negative mg/dl    Urobilinogen, UA 0 2 0 2, 1 0 E U /dl E U /dl    Bilirubin, UA Negative Negative    Blood, UA Negative Negative   Ferritin    Collection Time: 03/14/19  4:46 AM   Result Value Ref Range    Ferritin 173 8 - 388 ng/mL   Basic metabolic panel    Collection Time: 03/14/19  4:46 AM   Result Value Ref Range    Sodium 140 136 - 145 mmol/L    Potassium 3 9 3 5 - 5 3 mmol/L    Chloride 106 100 - 108 mmol/L    CO2 27 21 - 32 mmol/L    ANION GAP 7 4 - 13 mmol/L    BUN 8 5 - 25 mg/dL    Creatinine 0 63 0 60 - 1 30 mg/dL    Glucose 99 65 - 140 mg/dL    Calcium 8 8 8 3 - 10 1 mg/dL    eGFR 125 ml/min/1 73sq m   CBC and differential    Collection Time: 03/14/19  4:46 AM   Result Value Ref Range    WBC 7 53 4 31 - 10 16 Thousand/uL    RBC 4 70 3 88 - 5 62 Million/uL    Hemoglobin 14 4 12 0 - 17 0 g/dL    Hematocrit 43 2 36 5 - 49 3 %    MCV 92 82 - 98 fL    MCH 30 6 26 8 - 34 3 pg    MCHC 33 3 31 4 - 37 4 g/dL    RDW 13 7 11 6 - 15 1 %    MPV 12 2 8 9 - 12 7 fL    Platelets 008 800 - 225 Thousands/uL    nRBC 0 /100 WBCs    Neutrophils Relative 52 43 - 75 %    Immat GRANS % 1 0 - 2 %    Lymphocytes Relative 29 14 - 44 %    Monocytes Relative 11 4 - 12 %    Eosinophils Relative 6 0 - 6 %    Basophils Relative 1 0 - 1 %    Neutrophils Absolute 3 89 1 85 - 7 62 Thousands/µL    Immature Grans Absolute 0 08 0 00 - 0 20 Thousand/uL    Lymphocytes Absolute 2 20 0 60 - 4 47 Thousands/µL    Monocytes Absolute 0 84 0 17 - 1 22 Thousand/µL    Eosinophils Absolute 0 45 0 00 - 0 61 Thousand/µL    Basophils Absolute 0 07 0 00 - 0 10 Thousands/µL       Radiology Results: I have personally reviewed pertinent reports  Other Diagnostic Testing:   I have personally reviewed pertinent reports          Active Meds:   Current Facility-Administered Medications   Medication Dose Route Frequency    aspirin chewable tablet 81 mg  81 mg Oral Daily    atorvastatin (LIPITOR) tablet 80 mg  80 mg Oral Daily With Dinner    ceFAZolin (ANCEF) 2,000 mg in sodium chloride 0 9 % 50 mL IVPB  2,000 mg Intravenous Q8H    guaiFENesin (MUCINEX) 12 hr tablet 600 mg  600 mg Oral Q12H PRN    metoprolol succinate (TOPROL-XL) 24 hr tablet 12 5 mg  12 5 mg Oral Daily    nicotine (NICODERM CQ) 7 mg/24hr TD 24 hr patch 7 mg  7 mg Transdermal Daily    rivaroxaban (XARELTO) tablet 15 mg  15 mg Oral BID With Meals         VTE Pharmacologic Prophylaxis: Sequential compression device (Venodyne)   VTE Mechanical Prophylaxis: sequential compression device    Luis A Gasca MD

## 2019-03-14 NOTE — SOCIAL WORK
CSWS met with patient to review STAR program and it's components: referral to substance abuse rehab at [facility], referral to Certified  at [facility] and referral to HOST  Referral to HOST was discussed by CM yesterday, patient was agreeable and HOST met with patient yesterday  Patient expressed his desire for OP or IOP for substance abuse treatment  Patient declined a referral to [facility] for inpatient rehab, discussed benefits to receiving substance abuse treatment  Patient is agreeable with a referral to [address] [place] [address] for a CRS  Consents signed and sent to [employer]  Patient requests that CRS does not visit when family visits  This request was sent as well      Voicemail update to Jorge Han ([employer]) and verbal given to MSW VICKY Sanchez, who will follow up to discuss inpatient rehab in the instance that patient changes his mind

## 2019-03-14 NOTE — PROGRESS NOTES
Progress Note - Infectious Disease   Marlee Petersen 46 y o  male MRN: [id number]  Unit/Bed#: [facility] 412-01 Encounter: [id number]      Impression/Plan:  1  Sepsis-POA   [place] [facility] [place]  Suspect that this is related to patient's drug use   The patient remains hemodynamically stable and nontoxic despite his initial systemic illness   Patient currently tolerating antibiotics  -continue cefazolin through 3/22 to complete 2 weeks   -monitor CBC with diff and creatinine weekly  -supportive care     2  MSSA bacteremia:  Suspect this is related to the patient's polysubstance abuse along with multiple scabs on his skin   Although only 1 set is positive, consider to be clinically relevant given his acute status on presentation   Consideration for the possibility of endocarditis   Transthoracic and transesophageal echocardiograms are both negative for valvular vegetations   As this was a low-grade, transient bacteremia at worst, very unlikely that this represents an endovascular infection  -antibiotics as above  -plan for 2 weeks of antibiotics as above  -patient is status post PICC line     3  Injection drug abuse-patient previously denying he is currently using, however the current bacteremia is suspicious for ongoing drug use   Patient's U tox was noted to be positive on admission and he admitted today to snorting heroin    Complicated by hepatitis-C   HIV screen negative  -recommend host evaluation  -the patient is not a candidate for home intravenous antibiotics      4  Hepatitis C-in the setting of injection drug use   Liver function tests remain abnormal  -follow-up hep C RNA to confirm diagnosis  -monitor liver function test     5   Abnormal CT of the chest-the ground-glass changes are not really consistent with an acute bacterial pneumonia   Possibly a transient aspiration pneumonitis   Possibly this is a toxin event to the lungs in the setting of active drug use  The patient's respiratory status has improved  -no need for antibiotics for this issue  -monitor respiratory status     6  Pulmonary embolism-anticoagulation as per the primary service     7  Cardiomyopathy-unclear etiology  Potentially related to patient's drug use  Cardiac catheterization with significant disease   -ongoing evaluation by CT surgery     Above plan discussed in detail with the patient       ID consult service will continue to follow  Antibiotics:  Ancef    24 hour events:  No acute events noted overnight on chart review  Patient is currently afebrile  White blood cell count 7 5  No new culture data  Patient's other vitals are stable    Subjective:  Patient seen at bedside this morning and currently denies having any nausea, vomiting, chest pain or shortness of breath  He is tolerating antibiotics without any diarrhea or rash  Patient does admit on evaluation today that he snorted heroin prior to coming into the hospital   He reports that he last used IV drugs over the summer  Objective:  Vitals:  Temp:  [97 5 °F (36 4 °C)-98 2 °F (36 8 °C)] 98 °F (36 7 °C)  HR:  [55-73] 61  Resp:  [18-20] 20  BP: (106-129)/(60-67) 108/60  SpO2:  [90 %-98 %] 97 %  Temp (24hrs), Av 9 °F (36 6 °C), Min:97 5 °F (36 4 °C), Max:98 2 °F (36 8 °C)  Current: Temperature: 98 °F (36 7 °C)    Physical Exam:   General Appearance:  Alert, interactive, nontoxic, no acute distress  Throat: Oropharynx moist without lesions  Poor dentition noted  Lungs:   Clear to auscultation bilaterally; no wheezes, rhonchi or rales; respirations unlabored   Heart:  RRR; no rub or gallop; systolic murmur present   Abdomen:   Soft, non-tender, non-distended, positive bowel sounds  Extremities: No clubbing, cyanosis or edema   Skin: No new rashes or lesions  No new draining wounds noted         Labs, Imaging, & Other studies:   All pertinent labs and imaging studies were personally reviewed  Results from last 7 days   Lab Units 03/14/19  0446 03/13/19  0530 03/12/19  0503   WBC Thousand/uL 7 53 8 22 8 02   HEMOGLOBIN g/dL 14 4 13 6 13 7   PLATELETS Thousands/uL 224 207 219     Results from last 7 days   Lab Units 03/14/19  0446  03/12/19  0503   POTASSIUM mmol/L 3 9   < > 4 3   CHLORIDE mmol/L 106   < > 106   CO2 mmol/L 27   < > 25   BUN mg/dL 8   < > 10   CREATININE mg/dL 0 63   < > 0 69   EGFR ml/min/1 73sq m 125   < > 120   CALCIUM mg/dL 8 8   < > 8 6   AST U/L  --   --  82*   ALT U/L  --   --  124*   ALK PHOS U/L  --   --  64    < > = values in this interval not displayed  Results from last 7 days   Lab Units 03/08/19  1649 03/08/19  1641 03/07/19  1809   BLOOD CULTURE  No Growth After 5 Days  No Growth After 5 Days    --    LEGIONELLA URINARY ANTIGEN   --   --  Negative

## 2019-03-14 NOTE — PROGRESS NOTES
Cardiology Progress Note - Marichuy Bill 46 y o  male MRN: [id number]    Unit/Bed#: [facility] 412-01 Encounter: [id number]      Assessment/Recommendations:  1  Multivessel CAD with NSTEMI: continued on ASA, statin, B-blocker - for continued eval for potential CABG  2   New cardiomyopathy: likely ischemic with severe CAD, but also with h/o substance abuse that could have contributed  Agree with medical management in the interim with ASA, statin, B-blocker, and ACE-I  Eventual CABG  Will need LifeVest prior to discharge if not getting revascularized this admission  No evidence of volume overload at this time  3   Acute PE: continued on Xarelto for anticoagulation  4   HLD: continued on statin  5   Substance abuse    Subjective:   Patient seen and examined  No significant events overnight   ; pertinent negatives - chest pain, chest pressure/discomfort, dyspnea, irregular heart beat and palpitations  Objective:     Vitals: Blood pressure 108/60, pulse 61, temperature 98 °F (36 7 °C), temperature source Oral, resp  rate 20, height 6' (1 829 m), weight 98 2 kg (216 lb 6 4 oz), SpO2 97 %  , Body mass index is 29 35 kg/m² ,   Orthostatic Blood Pressures      Most Recent Value   Blood Pressure  108/60 filed at 03/14/2019 0740   Patient Position - Orthostatic VS  Lying filed at 03/14/2019 0740            Intake/Output Summary (Last 24 hours) at 3/14/2019 1055  Last data filed at 3/14/2019 0833  Gross per 24 hour   Intake 1590 ml   Output 875 ml   Net 715 ml       TELE: No significant arrhythmias seen      Physical Exam:    GEN: Marichuy iBll appears well, alert and oriented x 3, pleasant and cooperative   HEENT: pupils equal, round, and reactive to light; extraocular muscles intact  NECK: supple, no carotid bruits   HEART: regular rhythm, normal S1 and S2, no murmurs, clicks, gallops or rubs   LUNGS: clear to auscultation bilaterally; no wheezes, rales, or rhonchi   ABDOMEN: normal bowel sounds, soft, no tenderness, no distention  EXTREMITIES: peripheral pulses normal; no clubbing, cyanosis, or edema  NEURO: no focal findings   SKIN: normal without suspicious lesions on exposed skin    Medications:      Current Facility-Administered Medications:     aspirin chewable tablet 81 mg, 81 mg, Oral, Daily, Henrene Libman, MD, 81 mg at 03/14/19 9228    atorvastatin (LIPITOR) tablet 80 mg, 80 mg, Oral, Daily With Nuzhat Romero MD, 80 mg at 03/13/19 1754    ceFAZolin (ANCEF) 2,000 mg in sodium chloride 0 9 % 50 mL IVPB, 2,000 mg, Intravenous, Q8H, Henrene Libman, MD, Last Rate: 100 mL/hr at 03/14/19 0833, 2,000 mg at 03/14/19 0833    guaiFENesin (MUCINEX) 12 hr tablet 600 mg, 600 mg, Oral, Q12H PRN, Edwige Weinstein MD    lisinopril (ZESTRIL) tablet 2 5 mg, 2 5 mg, Oral, Daily, Francine Epps MD, 2 5 mg at 03/14/19 0844    nicotine (NICODERM CQ) 7 mg/24hr TD 24 hr patch 7 mg, 7 mg, Transdermal, Daily, Henrene Libman, MD, 7 mg at 03/14/19 5590    rivaroxaban (XARELTO) tablet 15 mg, 15 mg, Oral, BID With Meals, Henrene Libman, MD, 15 mg at 03/14/19 0832     Labs & Results:        Results from last 7 days   Lab Units 03/14/19 0446 03/13/19  0530 03/12/19  0503   WBC Thousand/uL 7 53 8 22 8 02   HEMOGLOBIN g/dL 14 4 13 6 13 7   HEMATOCRIT % 43 2 41 1 42 1   PLATELETS Thousands/uL 224 207 219     Results from last 7 days   Lab Units 03/13/19  0542   TRIGLYCERIDES mg/dL 220*   HDL mg/dL 29*     Results from last 7 days   Lab Units 03/14/19  0446 03/13/19  0530 03/12/19  0503   POTASSIUM mmol/L 3 9 3 8 4 3   CHLORIDE mmol/L 106 107 106   CO2 mmol/L 27 25 25   BUN mg/dL 8 8 10   CREATININE mg/dL 0 63 0 65 0 69   CALCIUM mg/dL 8 8 8 5 8 6   ALK PHOS U/L  --   --  64   ALT U/L  --   --  124*   AST U/L  --   --  82*     Results from last 7 days   Lab Units 03/11/19  0513 03/10/19  0534 03/09/19  0505 03/08/19  0634   INR  1 23*  --   --   --    PTT seconds  --  32 70* 64*     Results from last 7 days   Lab Units 03/13/19  0530 03/12/19  0503   MAGNESIUM mg/dL 2 0 1 9       Echo: personally reviewed - LV dilated with EF reduced 25-30%, dilated RV with reduced function, THMOPSON    EKG personally reviewed by Allie Vivar MD

## 2019-03-14 NOTE — PROGRESS NOTES
Pt  Stated he feels numb around bottom lip and felt bottom lip was slightly swollen  Pt  Resting in bed not in distress  VSS  Pt  Denies any difficulty breathing  Pt  States he was advised this morning about side effects (by Dr Yumi Hopper) of new drug started today by Dr Yumi Hopper (Lisinopril)  Dr Yumi Hopper notified  Was advised to monitor pt  At this time, no interventions needed at this time

## 2019-03-15 LAB
HCT VFR BLD AUTO: 41.5 % (ref 36.5–49.3)
HCV GENTYP SERPL NAA+PROBE: NORMAL
HCV PLEASE NOTE: NORMAL
HGB BLD-MCNC: 13.8 G/DL (ref 12–17)

## 2019-03-15 PROCEDURE — 99232 SBSQ HOSP IP/OBS MODERATE 35: CPT | Performed by: INTERNAL MEDICINE

## 2019-03-15 PROCEDURE — 85014 HEMATOCRIT: CPT | Performed by: INTERNAL MEDICINE

## 2019-03-15 PROCEDURE — 85018 HEMOGLOBIN: CPT | Performed by: INTERNAL MEDICINE

## 2019-03-15 RX ORDER — METOPROLOL SUCCINATE 25 MG/1
12.5 TABLET, EXTENDED RELEASE ORAL DAILY
Status: DISCONTINUED | OUTPATIENT
Start: 2019-03-15 | End: 2019-03-22 | Stop reason: HOSPADM

## 2019-03-15 RX ORDER — CEFAZOLIN SODIUM 2 G/50ML
2000 SOLUTION INTRAVENOUS EVERY 8 HOURS
Status: DISCONTINUED | OUTPATIENT
Start: 2019-03-15 | End: 2019-03-22

## 2019-03-15 RX ADMIN — CEFAZOLIN 2000 MG: 1 INJECTION, POWDER, FOR SOLUTION INTRAVENOUS at 09:50

## 2019-03-15 RX ADMIN — CEFAZOLIN SODIUM 2000 MG: 2 SOLUTION INTRAVENOUS at 17:38

## 2019-03-15 RX ADMIN — RIVAROXABAN 15 MG: 15 TABLET, FILM COATED ORAL at 09:42

## 2019-03-15 RX ADMIN — RIVAROXABAN 15 MG: 15 TABLET, FILM COATED ORAL at 17:38

## 2019-03-15 RX ADMIN — NICOTINE 7 MG: 7 PATCH TRANSDERMAL at 09:50

## 2019-03-15 RX ADMIN — CEFAZOLIN SODIUM 2000 MG: 10 INJECTION, POWDER, FOR SOLUTION INTRAVENOUS at 00:37

## 2019-03-15 RX ADMIN — ASPIRIN 81 MG 81 MG: 81 TABLET ORAL at 09:50

## 2019-03-15 RX ADMIN — ATORVASTATIN CALCIUM 80 MG: 80 TABLET, FILM COATED ORAL at 17:38

## 2019-03-15 RX ADMIN — LISINOPRIL 2.5 MG: 2.5 TABLET ORAL at 09:43

## 2019-03-15 RX ADMIN — METOPROLOL SUCCINATE 12.5 MG: 25 TABLET, EXTENDED RELEASE ORAL at 09:42

## 2019-03-15 NOTE — PROGRESS NOTES
Heart Failure Service Progress Note - Bia Patrick 46 y o  male MRN: [id number]    Unit/Bed#: [facility] 412-01 Encounter: [id number]      Assessment:    Principal Problem:    NSTEMI (non-ST elevated myocardial infarction) ([facility] )  Active Problems:    Closed traumatic nondisplaced fracture of one rib of right side    Acute subsegmental PE of LLE    Tobacco abuse    Bacteremia due to Staphylococcus aureus (MSSA)    Hepatitis C    TSH elevation    Hyperlipidemia    Essential hypertension    Ischemic cardiomyopathy (severe EF 25%)    CAD (coronary artery disease)    Polysubstance abuse ([facility] )    On continuous oral anticoagulation    Transaminitis    # Newly diagnosed HFrEF, LVEF 25-30%, LVIDd 5 5 cm, NYHA II/III, ACC/AHA Stage C: Most likely driven by iCM given severe CAD as noted below  Possible component of toxin induced (+ meth on admission, but states this is first time)  Less likely stress and/or myocarditis  Will proceed with the following:  Diag:  --TTE 3/7/19: LVEF 25-30%, LVIDd 5 5 cm  Mildly reduced RVSF w/ mild RVE  LAE>GAIL  Trace TR  No significant valvular disease  --COLEMAN 3/11/19: No clear vegetations noted  --LHC: 90% prox LAD  100% Mid LAD  w/ R->L collaterals  80% prox RI  90% midRCA    --CT PE 3/6/19: Subsegmental LL PE       To do:  --TSH ok, ferritin, HIV neg, LISA     Therapeutic:  --2g sodium diet  --2000 ml fluid restriction  --CT surgery evaluation ongoing; CABG as outpatient given bacteremia, acute PE, and active drug use  Will monitor closely as outpatient      Neurohormonal Blockade:  --Beta-Blocker: Start metoprolol succinate 12 5 mg PO daily  --ACEi, ARB or ARNi: Continue lisinopril 2 5 mg PO daily  --Aldosterone Receptor Blocker: Defer spironolactone for now given borderline BP  --Diuretic: Currently not requiring diuretics     Sudden Cardiac Death Risk Reduction:  --ICD: Given iCM, would favor LifeVest on D/C as iCM and unrevascularized w/ LVEF<35%   Patient agreeable       Electrical Resynchronization:  --Candidacy for BiV device: Narrow QRS     Advanced Therapies: Will continue to monitor  Given recent heroin/meth abuse, not currently a candidate      # MSSA Bacteremia: Currently on Abx  No e/o endocarditis on COLEMAN  # Severe 3v CAD: 90% prox LAD  100% Mid LAD  w/ R->L collaterals  80% prox RI  90% midRCA  # LLL subsegmental PE: Continue Xarelto as plan for outpatient CABG  # HLD: Continue statin    Subjective:   Patient seen and examined  No significant events overnight  Objective:     [employer] (day, reason): Jensen catheter (day, reason):    Vitals: Blood pressure 97/54, pulse 80, temperature 97 9 °F (36 6 °C), temperature source Oral, resp  rate 18, height 6' (1 829 m), weight 98 1 kg (216 lb 4 8 oz), SpO2 91 % , Body mass index is 29 34 kg/m² , I/O last 3 completed shifts: In: 1620 [P O :1570; IV Piggyback:50]  Out: 1250 [Urine:1250]  I/O this shift:  In: 240 [P O :240]  Out: -   Wt Readings from Last 3 Encounters:   03/15/19 98 1 kg (216 lb 4 8 oz)   03/07/19 102 kg (224 lb)       Intake/Output Summary (Last 24 hours) at 3/15/2019 0827  Last data filed at 3/15/2019 9140  Gross per 24 hour   Intake 1040 ml   Output 1000 ml   Net 40 ml     I/O last 3 completed shifts: In: 7842 [P O :1570; IV Piggyback:50]  Out: 1250 [Urine:1250]    No significant arrhythmias seen on telemetry review      Physical Exam:  Vitals:    03/14/19 2314 03/15/19 0300 03/15/19 0600 03/15/19 0751   BP: 90/53 99/50  97/54   BP Location: Left arm Left arm  Left arm   Pulse: 94 78  80   Resp: 20 18  18   Temp: 97 8 °F (36 6 °C) 97 5 °F (36 4 °C)  97 9 °F (36 6 °C)   TempSrc: Oral Oral  Oral   SpO2: 97% 98%  91%   Weight:   98 1 kg (216 lb 4 8 oz)    Height:           GEN: Abel Becker appears well, alert and oriented x 3, pleasant and cooperative   HEENT: pupils equal, round, and reactive to light; extraocular muscles intact  NECK: supple, no carotid bruits   HEART: regular rhythm, normal S1 and S2, no murmurs, clicks, gallops or rubs, JVP is    LUNGS: clear to auscultation bilaterally; no wheezes, rales, or rhonchi   ABDOMEN: normal bowel sounds, soft, no tenderness, no distention  EXTREMITIES: peripheral pulses normal; no clubbing, cyanosis, or edema  NEURO: no focal findings   SKIN: normal without suspicious lesions on exposed skin      Current Facility-Administered Medications:     aspirin chewable tablet 81 mg, 81 mg, Oral, Daily, Milton Sweet MD, 81 mg at 03/14/19 4208    atorvastatin (LIPITOR) tablet 80 mg, 80 mg, Oral, Daily With Eldonna Bernheim, MD, 80 mg at 03/14/19 1757    ceFAZolin (ANCEF) 2,000 mg in sodium chloride 0 9 % 50 mL IVPB, 2,000 mg, Intravenous, Q8H, Milton Sweet MD, Last Rate: 100 mL/hr at 03/15/19 0037, 2,000 mg at 03/15/19 0037    guaiFENesin (MUCINEX) 12 hr tablet 600 mg, 600 mg, Oral, Q12H PRN, Dee Peters MD    lisinopril (ZESTRIL) tablet 2 5 mg, 2 5 mg, Oral, Daily, Earl Tomlinson MD, 2 5 mg at 03/14/19 0844    nicotine (NICODERM CQ) 7 mg/24hr TD 24 hr patch 7 mg, 7 mg, Transdermal, Daily, Milton Sweet MD, 7 mg at 03/14/19 9494    rivaroxaban (XARELTO) tablet 15 mg, 15 mg, Oral, BID With Meals, Milton Sweet MD, 15 mg at 03/14/19 1758      Labs & Results:        Results from last 7 days   Lab Units 03/15/19  0433 03/14/19  0446 03/13/19  0530 03/12/19  0503   WBC Thousand/uL  --  7 53 8 22 8 02   HEMOGLOBIN g/dL 13 8 14 4 13 6 13 7   HEMATOCRIT % 41 5 43 2 41 1 42 1   PLATELETS Thousands/uL  --  224 207 219         Results from last 7 days   Lab Units 03/14/19  0446 03/13/19  0530 03/12/19  0503   POTASSIUM mmol/L 3 9 3 8 4 3   CHLORIDE mmol/L 106 107 106   CO2 mmol/L 27 25 25   BUN mg/dL 8 8 10   CREATININE mg/dL 0 63 0 65 0 69   CALCIUM mg/dL 8 8 8 5 8 6   ALK PHOS U/L  --   --  64   ALT U/L  --   --  124*   AST U/L  --   --  82*     Results from last 7 days   Lab Units 03/11/19  0513   INR  1 23*     EKG personally reviewed by Earl Tomlinson, MD      Counseling / Coordination of Care  Total floor / unit time spent today 40 minutes  Greater than 50% of total time was spent with the patient and / or family counseling and / or coordination of care  A description of the counseling / coordination of care: 20 min  Thank you for the opportunity to participate in the care of this patient      Kerby Cushing MD, PhD   Sang Ruiz

## 2019-03-15 NOTE — PROGRESS NOTES
Called by nursing stating that the patient had bleeding from his right basilic PICC line  Dressing was changed multiple times apparently with continued oozing of blood  On my assessment of the patient the bleeding at the PICC site appears to be well controlled and there does not appear to be any active bleed  Will check H&H  Recommend not using PICC at this time until bleeding completely resolved  Day team can reassess if the PICC line needs to be exchanged

## 2019-03-15 NOTE — PROGRESS NOTES
Paged SOD resident regarding bleeding from pt's PICC site, multiple dressing changes, and PICC line migrating outward  SOD at bedside to assess PICC  No active bleeding at this time  PICC line was not being used d/t no xray confirmation of placement documented  Previously paged by evening shift RN for xray order  Advised not to use PICC until reevaluated by day shift team  PICC line remains in place at this time  Will continue to monitor

## 2019-03-15 NOTE — PROGRESS NOTES
Progress Note - Infectious Disease   Lady Thompson 46 y o  male MRN: [id number]  Unit/Bed#: [facility] 412-01 Encounter: [id number]      Impression/Plan:  1  Sepsis-POA   Leukocytosis and tachycardia on admission at [facility] to [facility]  7  bacteremia   Suspect that this is related to patient's drug use   The patient remains hemodynamically stable and nontoxic despite his initial systemic illness   Patient currently tolerating antibiotics  -continue cefazolin through 3/22 to complete 2 weeks   -monitor CBC with diff and creatinine weekly  -repeat labs again on 03/20  -supportive care     2  MSSA bacteremia:  Suspect this is related to the patient's polysubstance abuse along with multiple scabs on his skin   Although only 1 set is positive, consider to be clinically relevant given his acute status on presentation   Consideration for the possibility of endocarditis   Transthoracic and transesophageal echocardiograms are both negative for valvular vegetations   As this was a low-grade, transient bacteremia at worst, very unlikely that this represents an endovascular infection  -antibiotics as above  -plan for 2 weeks of antibiotics as above  -patient is status post PICC line     3  Injection drug abuse-patient previously denying he is currently using, however the current bacteremia is suspicious for ongoing drug use   Patient's U tox was noted to be positive on admission and he admitted today to snorting heroin    Complicated by hepatitis-C   HIV screen negative  -recommend host evaluation  -the patient is not a candidate for home intravenous antibiotics      4  Hepatitis C-in the setting of injection drug use   Liver function tests remain abnormal   Hepatitis C RNA testing positive   -monitor liver function test  -patient may benefit from hep C therapy along with drug rehab     5   Abnormal CT of the chest-the ground-glass changes are not really consistent with an acute bacterial pneumonia   Possibly a transient aspiration pneumonitis   Possibly this is a toxin event to the lungs in the setting of active drug use  The patient's respiratory status has improved  -no need for antibiotics for this issue  -monitor respiratory status     6  Pulmonary embolism-anticoagulation as per the primary service     7  Cardiomyopathy-unclear etiology   Potentially related to patient's drug use   Cardiac catheterization with significant disease   -ongoing evaluation by CT surgery     Above plan discussed in detail with the patient       ID consult service will formally re-evaluate the patient on Monday  Please contact ID attending on call if any additional questions or concerns over the weekend  Antibiotics:  Ancef    24 hour events:  Patient had bleeding from his PICC line overnight  Patient is afebrile  No new culture data  Patient's other vitals are stable    Subjective:  Patient seen at bedside and currently denies having any nausea, vomiting, chest pain or shortness of breath  He is tolerating antibiotic without issue  He denies having any significant pain at his PICC site but does report the bleeding earlier today  Objective:  Vitals:  Temp:  [97 5 °F (36 4 °C)-98 2 °F (36 8 °C)] 97 9 °F (36 6 °C)  HR:  [73-94] 80  Resp:  [18-20] 18  BP: ()/(50-83) 97/54  SpO2:  [91 %-98 %] 91 %  Temp (24hrs), Av 9 °F (36 6 °C), Min:97 5 °F (36 4 °C), Max:98 2 °F (36 8 °C)  Current: Temperature: 97 9 °F (36 6 °C)    Physical Exam:   General Appearance:  Alert, interactive, nontoxic, no acute distress  Throat: Oropharynx moist without lesions  Poor dentition noted  Lungs:   Clear to auscultation bilaterally; no wheezes, rhonchi or rales; respirations unlabored   Heart:  RRR; no rub or gallop; systolic murmur present   Abdomen:   Soft, non-tender, non-distended, positive bowel sounds  Extremities: No clubbing, cyanosis or edema   Skin: No new rashes or lesions  No new draining wounds noted         Labs, Imaging, & Other studies:   All pertinent labs and imaging studies were personally reviewed  Results from last 7 days   Lab Units 03/15/19  0433 03/14/19  0446 03/13/19  0530 03/12/19  0503   WBC Thousand/uL  --  7 53 8 22 8 02   HEMOGLOBIN g/dL 13 8 14 4 13 6 13 7   PLATELETS Thousands/uL  --  224 207 219     Results from last 7 days   Lab Units 03/14/19  0446  03/12/19  0503   POTASSIUM mmol/L 3 9   < > 4 3   CHLORIDE mmol/L 106   < > 106   CO2 mmol/L 27   < > 25   BUN mg/dL 8   < > 10   CREATININE mg/dL 0 63   < > 0 69   EGFR ml/min/1 73sq m 125   < > 120   CALCIUM mg/dL 8 8   < > 8 6   AST U/L  --   --  82*   ALT U/L  --   --  124*   ALK PHOS U/L  --   --  64    < > = values in this interval not displayed  Results from last 7 days   Lab Units 03/13/19  1459 03/08/19  1649 03/08/19  1641   BLOOD CULTURE   --  No Growth After 5 Days  No Growth After 5 Days     MRSA CULTURE ONLY  No Methicillin Resistant Staphlyococcus aureus (MRSA) isolated  --   --

## 2019-03-15 NOTE — PROGRESS NOTES
IM Residency Progress Note   Unit/Bed#: [facility] 412-01 Encounter: [id number]  SOD Team B       Casie Barnett 46 y o  male [id number]    Hospital Stay Days: 3      Assessment/Plan:    Principal Problem:    NSTEMI (non-ST elevated myocardial infarction) [facility])  Active Problems:    Closed traumatic nondisplaced fracture of one rib of right side    Acute subsegmental PE of LLE    Tobacco abuse    Bacteremia due to Staphylococcus aureus (MSSA)    Hepatitis C    TSH elevation    Hyperlipidemia    Essential hypertension    Ischemic cardiomyopathy (severe EF 25%)    CAD (coronary artery disease)    Polysubstance abuse ([facility] )    On continuous oral anticoagulation    Transaminitis    1  NSTEMI  · Beta-blocker held per heart failure due to heart rate in 50s, currently in 70s-80s, consider restarting  · Atorvastatin 80 mg daily  · Aspirin 81 mg daily  · Lisinopril 2 5 mg daily  · CT surgery recommending outpatient CABG after completion of IV antibiotic     2  Acute Subsegmental PE of LLL  · Xarelto 15 mg BID  · Continue to monitor clinically     3  MSSA Bacteremia   Patient has PICC in place however overnight PICC line was bleeding from site  · Cefazolin 2000 mg IV q8 hours until 3/22/2019  · Monitor fever curves and WBC count     4  Essential Hypertension  · Lisinopril 2 5 mg daily   · Continue cardiopulmonary monitoring     5  Hyperlipidemia  · Atorvastatin to 80 mg daily     6  Ischemic Cardiomyopathy with an EF of 25%  · Monitor for signs of volume overload  · Will require life vest prior to discharge, will coordinate with heart failure    Disposition:  Continue inpatient care for IV antibiotic use  Currently awaiting LifeVest        Subjective:   Patient seen and examined this morning  No acute events overnight  Denies fevers, chills, sweats, CP, SOB, abdominal pain, palpitations, wheezing, dysuria, bowel complaints         Vitals: Temp (24hrs), Av 9 °F (36 6 °C), Min:97 5 °F (36 4 °C), Max:98 2 °F (36 8 °C)  Current: Temperature: 97 5 °F (36 4 °C)  Vitals:    03/14/19 1906 03/14/19 2314 03/15/19 0300 03/15/19 0600   BP: 131/83 90/53 99/50    BP Location: Right arm Left arm Left arm    Pulse: 88 94 78    Resp: 20 20 18    Temp: 98 2 °F (36 8 °C) 97 8 °F (36 6 °C) 97 5 °F (36 4 °C)    TempSrc: Oral Oral Oral    SpO2: 96% 97% 98%    Weight:    98 1 kg (216 lb 4 8 oz)   Height:        Body mass index is 29 34 kg/m²  I/O last 24 hours: In: 0317 [id number] [P O :990; IV Piggyback:50]  Out: 1000 [Urine:1000]      Physical Exam:  General Appearance:    Alert, cooperative, no distress, appears stated age  Lungs:     Clear to auscultation bilaterally, respirations unlabored  Heart:    Regular rate and rhythm, S1 and S2 normal, no murmur, rub   or gallop  Abdomen:     Soft, non-tender, bowel sounds active all four quadrants,     no masses, no organomegaly  Extremities:   Extremities normal, atraumatic, no cyanosis or edema  Pulses:   2+ and symmetric all extremities  Skin:   Skin color, texture, turgor normal, no rashes or lesions  Neurologic:   AAO x 3      Invasive Devices     Peripherally Inserted Central Catheter Line            PICC Line 04/38/78 Right Basilic less than 1 day          Peripheral Intravenous Line            Peripheral IV 03/14/19 Right Antecubital 1 day                          Labs:   Recent Results (from the past 24 hour(s))   Hemoglobin and hematocrit, blood    Collection Time: 03/15/19  4:33 AM   Result Value Ref Range    Hemoglobin 13 8 12 0 - 17 0 g/dL    Hematocrit 41 5 36 5 - 49 3 %       Radiology Results: I have personally reviewed pertinent reports  Other Diagnostic Testing:   I have personally reviewed pertinent reports          Active Meds:   Current Facility-Administered Medications   Medication Dose Route Frequency    aspirin chewable tablet 81 mg  81 mg Oral Daily    atorvastatin (LIPITOR) tablet 80 mg  80 mg Oral Daily With Dinner    ceFAZolin (ANCEF) 2,000 mg in sodium chloride 0 9 % 50 mL IVPB 2,000 mg Intravenous Q8H    guaiFENesin (MUCINEX) 12 hr tablet 600 mg  600 mg Oral Q12H PRN    lisinopril (ZESTRIL) tablet 2 5 mg  2 5 mg Oral Daily    nicotine (NICODERM CQ) 7 mg/24hr TD 24 hr patch 7 mg  7 mg Transdermal Daily    rivaroxaban (XARELTO) tablet 15 mg  15 mg Oral BID With Meals         VTE Pharmacologic Prophylaxis: Reason for no pharmacologic prophylaxis : Xarelto  VTE Mechanical Prophylaxis: sequential compression device    Mita Mcqueen MD

## 2019-03-15 NOTE — PLAN OF CARE
Problem: PAIN - ADULT  Goal: Verbalizes/displays adequate comfort level or baseline comfort level  Description  Interventions:  - Encourage patient to monitor pain and request assistance  - Assess pain using appropriate pain scale  - Administer analgesics based on type and severity of pain and evaluate response  - Implement non-pharmacological measures as appropriate and evaluate response  - Consider cultural and social influences on pain and pain management  - Notify physician/advanced practitioner if interventions unsuccessful or patient reports new pain  Outcome: Progressing     Problem: INFECTION - ADULT  Goal: Absence or prevention of progression during hospitalization  Description  INTERVENTIONS:  - Assess and monitor for signs and symptoms of infection  - Monitor lab/diagnostic results  - Monitor all insertion sites, i e  indwelling lines, tubes, and drains  - Monitor endotracheal (as able) and nasal secretions for changes in amount and color  - [place] appropriate cooling/warming therapies per order  - Administer medications as ordered  - Instruct and encourage patient and family to use good hand hygiene technique  - Identify and instruct in appropriate isolation precautions for identified infection/condition  Outcome: Progressing  Goal: Absence of fever/infection during neutropenic period  Description  INTERVENTIONS:  - Monitor WBC  - Implement neutropenic guidelines  Outcome: Progressing     Problem: SAFETY ADULT  Goal: Patient will remain free of falls  Description  INTERVENTIONS:  - Assess patient frequently for physical needs  -  Identify cognitive and physical deficits and behaviors that affect risk of falls    -  [place] fall precautions as indicated by assessment   - Educate patient/family on patient safety including physical limitations  - Instruct patient to call for assistance with activity based on assessment  - Modify environment to reduce risk of injury  - Consider OT/PT consult to assist with strengthening/mobility  Outcome: Progressing  Goal: Maintain or return to baseline ADL function  Description  INTERVENTIONS:  -  Assess patient's ability to carry out ADLs; assess patient's baseline for ADL function and identify physical deficits which impact ability to perform ADLs (bathing, care of mouth/teeth, toileting, grooming, dressing, etc )  - Assess/evaluate cause of self-care deficits   - Assess range of motion  - Assess patient's mobility; develop plan if impaired  - Assess patient's need for assistive devices and provide as appropriate  - Encourage maximum independence but intervene and supervise when necessary  ¯ Involve family in performance of ADLs  ¯ Assess for home care needs following discharge   ¯ Request OT consult to assist with ADL evaluation and planning for discharge  ¯ Provide patient education as appropriate  Outcome: Progressing  Goal: Maintain or return mobility status to optimal level  Description  INTERVENTIONS:  - Assess patient's baseline mobility status (ambulation, transfers, stairs, etc )    - Identify cognitive and physical deficits and behaviors that affect mobility  - Identify mobility aids required to assist with transfers and/or ambulation (gait belt, sit-to-stand, lift, walker, cane, etc )  - [place] fall precautions as indicated by assessment  - Record patient progress and toleration of activity level on Mobility SBAR; progress patient to next Phase/Stage  - Instruct patient to call for assistance with activity based on assessment  - Request Rehabilitation consult to assist with strengthening/weightbearing, etc   Outcome: Progressing     Problem: DISCHARGE PLANNING  Goal: Discharge to home or other facility with appropriate resources  Description  INTERVENTIONS:  - Identify barriers to discharge w/patient and caregiver  - Arrange for needed discharge resources and transportation as appropriate  - Identify discharge learning needs (meds, wound care, etc )  - Arrange for interpretive services to assist at discharge as needed  - Refer to Case Management Department for coordinating discharge planning if the patient needs post-hospital services based on physician/advanced practitioner order or complex needs related to functional status, cognitive ability, or social support system  Outcome: Progressing     Problem: CARDIOVASCULAR - ADULT  Goal: Maintains optimal cardiac output and hemodynamic stability  Description  INTERVENTIONS:  - Monitor I/O, vital signs and rhythm  - Monitor for S/S and trends of decreased cardiac output i e  bleeding, hypotension  - Administer and titrate ordered vasoactive medications to optimize hemodynamic stability  - Assess quality of pulses, skin color and temperature  - Assess for signs of decreased coronary artery perfusion - ex   Angina  - Instruct patient to report change in severity of symptoms  Outcome: Progressing  Goal: Absence of cardiac dysrhythmias or at baseline rhythm  Description  INTERVENTIONS:  - Continuous cardiac monitoring, monitor vital signs, obtain 12 lead EKG if indicated  - Administer antiarrhythmic and heart rate control medications as ordered  - Monitor electrolytes and administer replacement therapy as ordered  Outcome: Progressing     Problem: METABOLIC, FLUID AND ELECTROLYTES - ADULT  Goal: Electrolytes maintained within normal limits  Description  INTERVENTIONS:  - Monitor labs and assess patient for signs and symptoms of electrolyte imbalances  - Administer electrolyte replacement as ordered  - Monitor response to electrolyte replacements, including repeat lab results as appropriate  - Instruct patient on fluid and nutrition as appropriate  Outcome: Progressing  Goal: Fluid balance maintained  Description  INTERVENTIONS:  - Monitor labs and assess for signs and symptoms of volume excess or deficit  - Monitor I/O and WT  - Instruct patient on fluid and nutrition as appropriate  Outcome: Progressing  Goal: Glucose maintained within target range  Description  INTERVENTIONS:  - Monitor Blood Glucose as ordered  - Assess for signs and symptoms of hyperglycemia and hypoglycemia  - Administer ordered medications to maintain glucose within target range  - Assess nutritional intake and initiate nutrition service referral as needed  Outcome: Progressing     Problem: SKIN/TISSUE INTEGRITY - ADULT  Goal: Skin integrity remains intact  Description  INTERVENTIONS  - Identify patients at risk for skin breakdown  - Assess and monitor skin integrity  - Assess and monitor nutrition and hydration status  - Monitor labs (i e  albumin)  - Assess for incontinence   - Turn and reposition patient  - Assist with mobility/ambulation  - Relieve pressure over bony prominences  - Avoid friction and shearing  - Provide appropriate hygiene as needed including keeping skin clean and dry  - Evaluate need for skin moisturizer/barrier cream  - Collaborate with interdisciplinary team (i e  Nutrition, Rehabilitation, etc )   - Patient/family teaching  Outcome: Progressing  Goal: Incision(s), wounds(s) or drain site(s) healing without S/S of infection  Description  INTERVENTIONS  - Assess and document risk factors for skin impairment   - Assess and document dressing, incision, wound bed, drain sites and surrounding tissue  - Initiate Nutrition services consult and/or wound management as needed  Outcome: Progressing  Goal: Oral mucous membranes remain intact  Description  INTERVENTIONS  - Assess oral mucosa and hygiene practices  - Implement preventative oral hygiene regimen  - Implement oral medicated treatments as ordered  - Initiate Nutrition services referral as needed  Outcome: Progressing     Problem: DISCHARGE PLANNING - CARE MANAGEMENT  Goal: Discharge to post-acute care or home with appropriate resources  Description  INTERVENTIONS:  - Conduct assessment to determine patient/family and health care team treatment goals, and need for post-acute services based on payer coverage, community resources, and patient preferences, and barriers to discharge  - Address psychosocial, clinical, and financial barriers to discharge as identified in assessment in conjunction with the patient/family and health care team  - Arrange appropriate level of post-acute services according to patient's   needs and preference and payer coverage in collaboration with the physician and health care team  - Communicate with and update the patient/family, physician, and health care team regarding progress on the discharge plan  - Arrange appropriate transportation to post-acute venues    Plan home to his mother's home in [place], she will transport  [facility] for postop care      Outcome: Progressing

## 2019-03-15 NOTE — SOCIAL WORK
CM was informed pt will require a Clois Cover  Cardiology Dr Anselmo Graham provided medical order  CM faxed (f: [phone]) request for Clois Cover to [place] liaison Girish Aragon (c: [phone]) and also call liaison to alert him to referral     Due to pt's need of a Clois Cover, pt is not a candidate for the [facility] program  Pt requires IV ABX until 3/22/19  Due to this, pt must remain hospitalized until completion of IV ABX due to pt's hx of IV drug abuse  The Clois Cover precludes pt from the [facility] program as the staff there are unfamiliar with it and it is a liability for them to have a pt in the program wearing one  Due to pt not being cleared for d/c until 3/22/19, [facility] will not deliver [address] until that time  CM to follow

## 2019-03-16 ENCOUNTER — APPOINTMENT (INPATIENT)
Dept: RADIOLOGY | Facility: HOSPITAL | Age: 39
DRG: 190 | End: 2019-03-16
Payer: COMMERCIAL

## 2019-03-16 LAB
ANION GAP SERPL CALCULATED.3IONS-SCNC: 4 MMOL/L (ref 4–13)
BUN SERPL-MCNC: 11 MG/DL (ref 5–25)
CALCIUM SERPL-MCNC: 8.6 MG/DL (ref 8.3–10.1)
CHLORIDE SERPL-SCNC: 107 MMOL/L (ref 100–108)
CO2 SERPL-SCNC: 25 MMOL/L (ref 21–32)
CREAT SERPL-MCNC: 0.64 MG/DL (ref 0.6–1.3)
ERYTHROCYTE [DISTWIDTH] IN BLOOD BY AUTOMATED COUNT: 14 % (ref 11.6–15.1)
GFR SERPL CREATININE-BSD FRML MDRD: 124 ML/MIN/1.73SQ M
GLUCOSE SERPL-MCNC: 104 MG/DL (ref 65–140)
HCT VFR BLD AUTO: 44.4 % (ref 36.5–49.3)
HGB BLD-MCNC: 14.7 G/DL (ref 12–17)
MCH RBC QN AUTO: 30.1 PG (ref 26.8–34.3)
MCHC RBC AUTO-ENTMCNC: 33.1 G/DL (ref 31.4–37.4)
MCV RBC AUTO: 91 FL (ref 82–98)
PLATELET # BLD AUTO: 241 THOUSANDS/UL (ref 149–390)
PMV BLD AUTO: 12 FL (ref 8.9–12.7)
POTASSIUM SERPL-SCNC: 3.9 MMOL/L (ref 3.5–5.3)
RBC # BLD AUTO: 4.88 MILLION/UL (ref 3.88–5.62)
RYE IGE QN: NEGATIVE
SODIUM SERPL-SCNC: 136 MMOL/L (ref 136–145)
WBC # BLD AUTO: 7.61 THOUSAND/UL (ref 4.31–10.16)

## 2019-03-16 PROCEDURE — 99232 SBSQ HOSP IP/OBS MODERATE 35: CPT | Performed by: INTERNAL MEDICINE

## 2019-03-16 PROCEDURE — 85027 COMPLETE CBC AUTOMATED: CPT | Performed by: STUDENT IN AN ORGANIZED HEALTH CARE EDUCATION/TRAINING PROGRAM

## 2019-03-16 PROCEDURE — 71045 X-RAY EXAM CHEST 1 VIEW: CPT

## 2019-03-16 PROCEDURE — 80048 BASIC METABOLIC PNL TOTAL CA: CPT | Performed by: STUDENT IN AN ORGANIZED HEALTH CARE EDUCATION/TRAINING PROGRAM

## 2019-03-16 RX ORDER — POTASSIUM CHLORIDE 20 MEQ/1
20 TABLET, EXTENDED RELEASE ORAL ONCE
Status: COMPLETED | OUTPATIENT
Start: 2019-03-16 | End: 2019-03-16

## 2019-03-16 RX ADMIN — RIVAROXABAN 15 MG: 15 TABLET, FILM COATED ORAL at 09:29

## 2019-03-16 RX ADMIN — ASPIRIN 81 MG 81 MG: 81 TABLET ORAL at 09:30

## 2019-03-16 RX ADMIN — CEFAZOLIN SODIUM 2000 MG: 2 SOLUTION INTRAVENOUS at 09:31

## 2019-03-16 RX ADMIN — METOPROLOL SUCCINATE 12.5 MG: 25 TABLET, EXTENDED RELEASE ORAL at 09:29

## 2019-03-16 RX ADMIN — CEFAZOLIN SODIUM 2000 MG: 2 SOLUTION INTRAVENOUS at 01:11

## 2019-03-16 RX ADMIN — POTASSIUM CHLORIDE 20 MEQ: 1500 TABLET, EXTENDED RELEASE ORAL at 12:30

## 2019-03-16 RX ADMIN — RIVAROXABAN 15 MG: 15 TABLET, FILM COATED ORAL at 17:36

## 2019-03-16 RX ADMIN — NICOTINE 7 MG: 7 PATCH TRANSDERMAL at 09:30

## 2019-03-16 RX ADMIN — ATORVASTATIN CALCIUM 80 MG: 80 TABLET, FILM COATED ORAL at 17:36

## 2019-03-16 RX ADMIN — LISINOPRIL 2.5 MG: 2.5 TABLET ORAL at 09:29

## 2019-03-16 RX ADMIN — CEFAZOLIN SODIUM 2000 MG: 2 SOLUTION INTRAVENOUS at 17:39

## 2019-03-16 NOTE — PROGRESS NOTES
Cardiology Progress Note - Lady Thompson 46 y o  male MRN: [id number]    Unit/Bed#: [facility] 412-01 Encounter: [id number]      Assessment/Recommendations:  1  Multivessel CAD with NSTEMI: continued on ASA, statin, B-blocker - for continued eval for potential CABG once infection clears  2   New cardiomyopathy: likely ischemic with severe CAD, but also with h/o substance abuse that could have contributed  Agree with medical management in the interim with ASA, statin, B-blocker, and ACE-I  Eventual CABG  Will need LifeVest prior to discharge if not getting revascularized this admission  No evidence of volume overload at this time  3   Acute PE: continued on Xarelto for anticoagulation  4   HLD: continued on statin  5   Substance abuse and MSSA bacteremia  6  NSVT: due to cardiomyopathy - will give extra dose of potassium today, will need LifeVest on discharge  Subjective:   Patient seen and examined  No significant events overnight   ; pertinent negatives - chest pain, chest pressure/discomfort, dyspnea, irregular heart beat and palpitations  Objective:     Vitals: Blood pressure 102/58, pulse 67, temperature 98 2 °F (36 8 °C), temperature source Oral, resp  rate 20, height 6' (1 829 m), weight 98 1 kg (216 lb 4 8 oz), SpO2 97 %  , Body mass index is 29 34 kg/m² ,   Orthostatic Blood Pressures      Most Recent Value   Blood Pressure  102/58 filed at 03/16/2019 7237   Patient Position - Orthostatic VS  Lying filed at 03/16/2019 0755            Intake/Output Summary (Last 24 hours) at 3/16/2019 1220  Last data filed at 3/16/2019 0530  Gross per 24 hour   Intake 1180 ml   Output 650 ml   Net 530 ml       TELE: 11 beats NSVT    Physical Exam:    GEN: Lady Thompson appears well, alert and oriented x 3, pleasant and cooperative   HEENT: pupils equal, round, and reactive to light; extraocular muscles intact  NECK: supple, no carotid bruits   HEART: regular rhythm, normal S1 and S2, no murmurs, clicks, gallops or rubs   LUNGS: clear to auscultation bilaterally; no wheezes, rales, or rhonchi   ABDOMEN: normal bowel sounds, soft, no tenderness, no distention  EXTREMITIES: peripheral pulses normal; no clubbing, cyanosis, or edema  NEURO: no focal findings   SKIN: normal without suspicious lesions on exposed skin    Medications:      Current Facility-Administered Medications:     aspirin chewable tablet 81 mg, 81 mg, Oral, Daily, Pola Fonseca MD, 81 mg at 03/16/19 0930    atorvastatin (LIPITOR) tablet 80 mg, 80 mg, Oral, Daily With Umu Quezada MD, 80 mg at 03/15/19 1738    ceFAZolin (ANCEF) IVPB (premix) 2,000 mg, 2,000 mg, Intravenous, Q8H, Helder Rod CRNA, Last Rate: 100 mL/hr at 03/16/19 0931, 2,000 mg at 03/16/19 0931    guaiFENesin (MUCINEX) 12 hr tablet 600 mg, 600 mg, Oral, Q12H PRN, Zee Esteves MD    lisinopril (ZESTRIL) tablet 2 5 mg, 2 5 mg, Oral, Daily, Michoacano Rudd MD, 2 5 mg at 03/16/19 0929    metoprolol succinate (TOPROL-XL) 24 hr tablet 12 5 mg, 12 5 mg, Oral, Daily, Michoacano Rudd MD, 12 5 mg at 03/16/19 0929    nicotine (NICODERM CQ) 7 mg/24hr TD 24 hr patch 7 mg, 7 mg, Transdermal, Daily, Pola Fonseca MD, 7 mg at 03/16/19 0930    rivaroxaban (XARELTO) tablet 15 mg, 15 mg, Oral, BID With Meals, Pola Fonseca MD, 15 mg at 03/16/19 0929     Labs & Results:        Results from last 7 days   Lab Units 03/16/19  0429 03/15/19  0433 03/14/19  0446 03/13/19  0530   WBC Thousand/uL 7 61  --  7 53 8 22   HEMOGLOBIN g/dL 14 7 13 8 14 4 13 6   HEMATOCRIT % 44 4 41 5 43 2 41 1   PLATELETS Thousands/uL 241  --  224 207     Results from last 7 days   Lab Units 03/13/19  0542   TRIGLYCERIDES mg/dL 220*   HDL mg/dL 29*     Results from last 7 days   Lab Units 03/16/19  0429 03/14/19  0446 03/13/19  0530 03/12/19  0503   POTASSIUM mmol/L 3 9 3 9 3 8 4 3   CHLORIDE mmol/L 107 106 107 106   CO2 mmol/L 25 27 25 25   BUN mg/dL 11 8 8 10   CREATININE mg/dL 0 64 0 63 0 65 0 69 CALCIUM mg/dL 8 6 8 8 8 5 8 6   ALK PHOS U/L  --   --   --  64   ALT U/L  --   --   --  124*   AST U/L  --   --   --  82*     Results from last 7 days   Lab Units 03/11/19  0513 03/10/19  0534   INR  1 23*  --    PTT seconds  --  32     Results from last 7 days   Lab Units 03/13/19  0530 03/12/19  0503   MAGNESIUM mg/dL 2 0 1 9       Echo: personally reviewed - LV dilated with EF reduced 25-30%, dilated RV with reduced function, THOMPSON    EKG personally reviewed by Lucetta Bosworth, MD

## 2019-03-16 NOTE — PROGRESS NOTES
IM Residency Progress Note   Unit/Bed#: [facility] 412-01 Encounter: [id number]  SOD Team B       Agustín Montemayor 46 y o  male [id number]    Hospital Stay Days: 4      Assessment/Plan:    Principal Problem:    NSTEMI (non-ST elevated myocardial infarction) [facility])  Active Problems:    Closed traumatic nondisplaced fracture of one rib of right side    Acute subsegmental PE of LLE    Tobacco abuse    Bacteremia due to Staphylococcus aureus (MSSA)    Hepatitis C    TSH elevation    Hyperlipidemia    Essential hypertension    Ischemic cardiomyopathy (severe EF 25%)    CAD (coronary artery disease)    Polysubstance abuse ([facility] [address] )    On continuous oral anticoagulation    Transaminitis    1  NSTEMI  ·  metoprolol succinate 12 5 mg daily  · Atorvastatin 80 mg daily  · Aspirin 81 mg daily  · Lisinopril 2 5 mg daily  · CT surgery recommending outpatient CABG after completion of IV antibiotic  Follow-up chest x-ray for PICC line      2  Acute Subsegmental PE of LLL  · Xarelto 15 mg BID  · Continue to monitor clinically     3  MSSA Bacteremia   Patient has PICC in place however overnight PICC line was bleeding from site  · Cefazolin 2000 mg IV q8 hours until 3/22/2019  · Monitor fever curves and WBC count     4  Essential Hypertension  · Lisinopril 2 5 mg daily   · Continue cardiopulmonary monitoring     5  Hyperlipidemia  · Atorvastatin to 80 mg daily     6  Ischemic Cardiomyopathy with an EF of 25%  · Monitor for signs of volume overload  · Will require life vest prior to discharge, will be delivered on discharge    Disposition:  Continue IV antibiotics through 3/22       Subjective:   Patient seen and examined this morning  No acute events overnight  Denies fevers, chills, sweats, CP, SOB, abdominal pain, palpitations, wheezing, dysuria, bowel complaints  PICC line is not patent         Vitals: Temp (24hrs), Av 1 °F (36 7 °C), Min:97 9 °F (36 6 °C), Max:98 3 °F (36 8 °C)  Current: Temperature: 98 1 °F (36 7 °C)  Vitals: 03/15/19 1600 03/15/19 1935 03/16/19 0005 03/16/19 0324   BP: 116/64 107/62 95/50 104/57   BP Location: Left arm Left arm Left arm Left arm   Pulse: 77 90 70 77   Resp: 18 18 18 18   Temp: 98 1 °F (36 7 °C) 98 3 °F (36 8 °C) 97 9 °F (36 6 °C) 98 1 °F (36 7 °C)   TempSrc: Oral Oral Oral Oral   SpO2: 96% 97% 98% 98%   Weight:       Height:        Body mass index is 29 34 kg/m²  I/O last 24 hours:   In: 1420 [P O :1420]  Out: 1050 [Urine:1050]      Physical Exam:   General Appearance:    Alert, cooperative, no distress, appears stated age  Lungs:     Clear to auscultation bilaterally, respirations unlabored  Heart:    Regular rate and rhythm, S1 and S2 normal, no murmur, rub   or gallop  Abdomen:     Soft, non-tender, bowel sounds active all four quadrants,     no masses, no organomegaly  Extremities:   Extremities normal, atraumatic, no cyanosis or edema  Pulses:   2+ and symmetric all extremities  Skin:   Skin color, texture, turgor normal, no rashes or lesions  Neurologic:   AAO x 3      Invasive Devices     Peripherally Inserted Central Catheter Line            PICC Line 33/02/89 Right Basilic 1 day          Peripheral Intravenous Line            Peripheral IV 03/14/19 Right Antecubital 2 days                          Labs:   Recent Results (from the past 24 hour(s))   CBC    Collection Time: 03/16/19  4:29 AM   Result Value Ref Range    WBC 7 61 4 31 - 10 16 Thousand/uL    RBC 4 88 3 88 - 5 62 Million/uL    Hemoglobin 14 7 12 0 - 17 0 g/dL    Hematocrit 44 4 36 5 - 49 3 %    MCV 91 82 - 98 fL    MCH 30 1 26 8 - 34 3 pg    MCHC 33 1 31 4 - 37 4 g/dL    RDW 14 0 11 6 - 15 1 %    Platelets 672 260 - 881 Thousands/uL    MPV 12 0 8 9 - 12 7 fL   Basic metabolic panel    Collection Time: 03/16/19  4:29 AM   Result Value Ref Range    Sodium 136 136 - 145 mmol/L    Potassium 3 9 3 5 - 5 3 mmol/L    Chloride 107 100 - 108 mmol/L    CO2 25 21 - 32 mmol/L    ANION GAP 4 4 - 13 mmol/L    BUN 11 5 - 25 mg/dL    Creatinine 0  64 0 60 - 1 30 mg/dL    Glucose 104 65 - 140 mg/dL    Calcium 8 6 8 3 - 10 1 mg/dL    eGFR 124 ml/min/1 73sq m       Radiology Results: I have personally reviewed pertinent reports  Other Diagnostic Testing:   I have personally reviewed pertinent reports          Active Meds:   Current Facility-Administered Medications   Medication Dose Route Frequency    aspirin chewable tablet 81 mg  81 mg Oral Daily    atorvastatin (LIPITOR) tablet 80 mg  80 mg Oral Daily With Dinner    ceFAZolin (ANCEF) IVPB (premix) 2,000 mg  2,000 mg Intravenous Q8H    guaiFENesin (MUCINEX) 12 hr tablet 600 mg  600 mg Oral Q12H PRN    lisinopril (ZESTRIL) tablet 2 5 mg  2 5 mg Oral Daily    metoprolol succinate (TOPROL-XL) 24 hr tablet 12 5 mg  12 5 mg Oral Daily    nicotine (NICODERM CQ) 7 mg/24hr TD 24 hr patch 7 mg  7 mg Transdermal Daily    rivaroxaban (XARELTO) tablet 15 mg  15 mg Oral BID With Meals         VTE Pharmacologic Prophylaxis: Reason for no pharmacologic prophylaxis :  Xarelto  VTE Mechanical Prophylaxis: sequential compression device    Prema Reynoso MD

## 2019-03-17 PROCEDURE — 99232 SBSQ HOSP IP/OBS MODERATE 35: CPT | Performed by: INTERNAL MEDICINE

## 2019-03-17 PROCEDURE — 80307 DRUG TEST PRSMV CHEM ANLYZR: CPT | Performed by: INTERNAL MEDICINE

## 2019-03-17 RX ADMIN — CEFAZOLIN SODIUM 2000 MG: 2 SOLUTION INTRAVENOUS at 17:22

## 2019-03-17 RX ADMIN — NICOTINE 7 MG: 7 PATCH TRANSDERMAL at 09:44

## 2019-03-17 RX ADMIN — ASPIRIN 81 MG 81 MG: 81 TABLET ORAL at 09:42

## 2019-03-17 RX ADMIN — CEFAZOLIN SODIUM 2000 MG: 2 SOLUTION INTRAVENOUS at 09:41

## 2019-03-17 RX ADMIN — METOPROLOL SUCCINATE 12.5 MG: 25 TABLET, EXTENDED RELEASE ORAL at 09:40

## 2019-03-17 RX ADMIN — LISINOPRIL 2.5 MG: 2.5 TABLET ORAL at 09:42

## 2019-03-17 RX ADMIN — CEFAZOLIN SODIUM 2000 MG: 2 SOLUTION INTRAVENOUS at 00:36

## 2019-03-17 RX ADMIN — ATORVASTATIN CALCIUM 80 MG: 80 TABLET, FILM COATED ORAL at 17:22

## 2019-03-17 RX ADMIN — RIVAROXABAN 15 MG: 15 TABLET, FILM COATED ORAL at 09:41

## 2019-03-17 RX ADMIN — RIVAROXABAN 15 MG: 15 TABLET, FILM COATED ORAL at 17:22

## 2019-03-17 NOTE — PROGRESS NOTES
Cardiology Progress Note - Agustín Montemayor 46 y o  male MRN: [id number]    Unit/Bed#: [facility] 412-01 Encounter: [id number]      Assessment/Recommendations:  1  Multivessel CAD with NSTEMI: continued on ASA, statin, B-blocker - for continued eval for potential CABG once infection clears  2   New cardiomyopathy: likely ischemic with severe CAD, but also with h/o substance abuse that could have contributed - with possible continued use even in hospital - UDS pending  Agree with medical management in the interim with ASA, statin, B-blocker, and ACE-I  Eventual CABG  Will need LifeVest prior to discharge since he is not getting revascularized this admission  No evidence of volume overload at this time  3   Acute PE: continued on Xarelto for anticoagulation  4   HLD: continued on statin  5   Substance abuse and MSSA bacteremia: with possible continued use  6  NSVT: due to cardiomyopathy - no events overnight  Subjective:   Patient seen and examined  Tachycardic this morning - concern for use of drugs in the hospital   ; pertinent negatives - chest pain, chest pressure/discomfort, dyspnea, irregular heart beat and palpitations  Objective:     Vitals: Blood pressure 129/83, pulse (!) 118, temperature 98 7 °F (37 1 °C), temperature source Oral, resp  rate 20, height 6' (1 829 m), weight 99 kg (218 lb 4 8 oz), SpO2 98 %  , Body mass index is 29 61 kg/m² ,   Orthostatic Blood Pressures      Most Recent Value   Blood Pressure  129/83 filed at 03/17/2019 0940   Patient Position - Orthostatic VS  Sitting filed at 03/17/2019 0755            Intake/Output Summary (Last 24 hours) at 3/17/2019 1202  Last data filed at 3/17/2019 0755  Gross per 24 hour   Intake 1350 ml   Output 750 ml   Net 600 ml       TELE: No significant events overnight    Physical Exam:    GEN: Agustín Montemayor appears well, alert and oriented x 3, pleasant and cooperative   HEENT: pupils equal, round, and reactive to light; extraocular muscles intact  NECK: supple, no carotid bruits   HEART: regular rhythm, normal S1 and S2, no murmurs, clicks, gallops or rubs   LUNGS: clear to auscultation bilaterally; no wheezes, rales, or rhonchi   ABDOMEN: normal bowel sounds, soft, no tenderness, no distention  EXTREMITIES: peripheral pulses normal; no clubbing, cyanosis, or edema  NEURO: no focal findings   SKIN: normal without suspicious lesions on exposed skin      Medications:      Current Facility-Administered Medications:     aspirin chewable tablet 81 mg, 81 mg, Oral, Daily, Elena Diane MD, 81 mg at 03/17/19 0942    atorvastatin (LIPITOR) tablet 80 mg, 80 mg, Oral, Daily With Robert Heard MD, 80 mg at 03/16/19 1736    ceFAZolin (ANCEF) IVPB (premix) 2,000 mg, 2,000 mg, Intravenous, Q8H, Zeferino Alejandra CRNA, Last Rate: 100 mL/hr at 03/17/19 0941, 2,000 mg at 03/17/19 0941    guaiFENesin (MUCINEX) 12 hr tablet 600 mg, 600 mg, Oral, Q12H PRN, Aryan Campbell MD    lisinopril (ZESTRIL) tablet 2 5 mg, 2 5 mg, Oral, Daily, Rizwana De La Garza MD, 2 5 mg at 03/17/19 0942    metoprolol succinate (TOPROL-XL) 24 hr tablet 12 5 mg, 12 5 mg, Oral, Daily, Rizwana De La Garza MD, 12 5 mg at 03/17/19 0940    nicotine (NICODERM CQ) 7 mg/24hr TD 24 hr patch 7 mg, 7 mg, Transdermal, Daily, Elena Diane MD, 7 mg at 03/17/19 0944    rivaroxaban (XARELTO) tablet 15 mg, 15 mg, Oral, BID With Meals, Elena Diane MD, 15 mg at 03/17/19 0941     Labs & Results:        Results from last 7 days   Lab Units 03/16/19  0429 03/15/19  0433 03/14/19  0446 03/13/19  0530   WBC Thousand/uL 7 61  --  7 53 8 22   HEMOGLOBIN g/dL 14 7 13 8 14 4 13 6   HEMATOCRIT % 44 4 41 5 43 2 41 1   PLATELETS Thousands/uL 241  --  224 207     Results from last 7 days   Lab Units 03/13/19  0542   TRIGLYCERIDES mg/dL 220*   HDL mg/dL 29*     Results from last 7 days   Lab Units 03/16/19  0429 03/14/19  0446 03/13/19  0530 03/12/19  0503   POTASSIUM mmol/L 3 9 3 9 3 8 4 3   CHLORIDE mmol/L 107 106 107 106   CO2 mmol/L 25 27 25 25   BUN mg/dL 11 8 8 10   CREATININE mg/dL 0 64 0 63 0 65 0 69   CALCIUM mg/dL 8 6 8 8 8 5 8 6   ALK PHOS U/L  --   --   --  64   ALT U/L  --   --   --  124*   AST U/L  --   --   --  82*     Results from last 7 days   Lab Units 03/11/19  0513   INR  1 23*     Results from last 7 days   Lab Units 03/13/19  0530 03/12/19  0503   MAGNESIUM mg/dL 2 0 1 9       Echo: personally reviewed - LV dilated with EF reduced 25-30%, dilated RV with reduced function, THOMPSON    EKG personally reviewed by Noni Cabot, MD

## 2019-03-17 NOTE — PROGRESS NOTES
Interval progress note:    Paged by patient's nurse  Reports that patient had acute change in mental status with concerns for possible drug intoxication  Patient was questioned by nursing staff and adamantly denied using any substance  Patient was seen and evaluated by SOD-B intern as well as myself at bedside  Patient continues to deny use of any illicit substance  On examination, patient's pupils noted to be pinpoint, mildly diaphoretic, tachycardic with heart rate in the 120s  Patient's affect is noted to be animated  Patient has known history of illicit substance abuse including methamphetamines and opiates  UDS at time of admission consistent with above  Discussed with nursing and noted that patient had 2 visitors late yesterday evening at approximately 11:00 p m   It is not exactly clear to these visitors were  Patient reports that the visitors were his friends  At this time, I have informed patient that we will repeat a drug screen  I have had extensive discussion with the patient regarding his health and concerns that use of any illicit substance could result in serious injury or death  Patient adamantly denies using any illicit substance at this time  Security was asked to perform a thorough investigation of the room and no illicit substances were found  We will recheck a urine drug screen as well as blood toxicology  I have advised nursing staff to limit patient's visitors for now to strict visiting hours only  No late-night visitors are to be allowed in patient's room

## 2019-03-17 NOTE — PROGRESS NOTES
IM Residency Progress Note   Unit/Bed#: [facility] 412-01 Encounter: [id number]  SOD Team B       Ariella Wells 46 y o  male [id number]    Hospital Stay Days: 5      Assessment/Plan:    1  NSTEMI  ·  metoprolol succinate 12 5 mg daily  · Atorvastatin 80 mg daily  · Aspirin 81 mg daily  · Lisinopril 2 5 mg daily  · CT surgery recommending outpatient CABG after completion of IV antibiotic        2  Acute Subsegmental PE of LLL  · Xarelto 15 mg BID  · Continue to monitor clinically     3  MSSA Bacteremia   Patient has PICC in place however overnight PICC found to be in improper place  · Cefazolin 2000 mg IV q8 hours until 3/22/2019  · Monitor fever curves and WBC count     4  Essential Hypertension  · Lisinopril 2 5 mg daily   · Continue cardiopulmonary monitoring     5  Hyperlipidemia  · Atorvastatin to 80 mg daily     6  Ischemic Cardiomyopathy with an EF of 25%  · Monitor for signs of volume overload  · Will require life vest prior to discharge, will be delivered on discharge    Disposition: Inpatient      Subjective:   Patient seen and examined at bedside  PICC line found to be in improper placement last night  No other acute events overnight  Patient denies chest pain, SOB, nausea, vomiting, diarrhea, fevers/chills  Vitals: Temp (24hrs), Av 4 °F (36 9 °C), Min:98 2 °F (36 8 °C), Max:98 7 °F (37 1 °C)  Current: Temperature: 98 6 °F (37 °C)  Vitals:    19 1500 19 1900 19 0300 19 0548   BP: 105/70 111/63 124/80    BP Location: Left arm Left arm Left arm    Pulse: 101 89 88    Resp: 18 18 18    Temp: 98 4 °F (36 9 °C) 98 7 °F (37 1 °C) 98 6 °F (37 °C)    TempSrc: Oral Oral Oral    SpO2: 98% 98% 97%    Weight:    99 kg (218 lb 4 8 oz)   Height:        Body mass index is 29 61 kg/m²  I/O last 24 hours: In: 200 [P O :1180; IV Piggyback:50]  Out: 750 [Urine:750]      Physical Exam   Constitutional: He is oriented to person, place, and time   He appears well-developed and well-nourished  No distress  HENT:   Head: Normocephalic and atraumatic  Nose: Nose normal    Mouth/Throat: No oropharyngeal exudate  Eyes: Conjunctivae are normal  Right eye exhibits no discharge  Left eye exhibits no discharge  No scleral icterus  Neck: Neck supple  No JVD present  Cardiovascular: Normal rate, regular rhythm, normal heart sounds and intact distal pulses  Exam reveals no gallop and no friction rub  No murmur heard  Pulmonary/Chest: Effort normal and breath sounds normal  No respiratory distress  He has no wheezes  He has no rales  Abdominal: Soft  Bowel sounds are normal  He exhibits no distension  There is no tenderness  There is no rebound and no guarding  Musculoskeletal: Normal range of motion  He exhibits no edema  Neurological: He is alert and oriented to person, place, and time  Skin: Skin is warm and dry  He is not diaphoretic  No erythema  Psychiatric: He has a normal mood and affect  Invasive Devices     Peripherally Inserted Central Catheter Line            PICC Line 80/27/43 Right Basilic 2 days          Peripheral Intravenous Line            Peripheral IV 03/14/19 Right Antecubital 3 days                Labs: No results found for this or any previous visit (from the past 24 hour(s))  Radiology Results: I have personally reviewed pertinent reports  Other Diagnostic Testing:   I have personally reviewed pertinent reports          Active Meds:   Current Facility-Administered Medications   Medication Dose Route Frequency    aspirin chewable tablet 81 mg  81 mg Oral Daily    atorvastatin (LIPITOR) tablet 80 mg  80 mg Oral Daily With Dinner    ceFAZolin (ANCEF) IVPB (premix) 2,000 mg  2,000 mg Intravenous Q8H    guaiFENesin (MUCINEX) 12 hr tablet 600 mg  600 mg Oral Q12H PRN    lisinopril (ZESTRIL) tablet 2 5 mg  2 5 mg Oral Daily    metoprolol succinate (TOPROL-XL) 24 hr tablet 12 5 mg  12 5 mg Oral Daily    nicotine (NICODERM CQ) 7 mg/24hr TD 24 hr patch 7 mg  7 mg Transdermal Daily    rivaroxaban (XARELTO) tablet 15 mg  15 mg Oral BID With Meals         VTE Pharmacologic Prophylaxis: Reason for no pharmacologic prophylaxis DOAC  VTE Mechanical Prophylaxis: sequential compression device    Suzanna Dove DO

## 2019-03-17 NOTE — PROGRESS NOTES
Examined pt's chest x ray for proper placement of PICC line  PICC line appears to not be in correct position and could not be visualized within the thorax  On exam, PICC line appears to be curled outside of the skin when it was originally set at [employer]   OK to access PICC line for antibiotics but may need to be replaced

## 2019-03-17 NOTE — PROGRESS NOTES
Pt  Darya Tobias yelling and banging on surfaces in room, when RN went to check on patient found patient sitting on edge of bed incoherent, half asleep, not his usual baseline, SOD- B called made aware of behavior, pt was asked if he took any type of medication/drug that was not administered here by RN he denied, room was searched by RN for any contraband with permission of patient by Kathie Galeas RN  Sod-B came to bedside to evaluate, told patient of concerns for intoxication of unknown substance, informed of drug testing and visitor restrictions  Patient agreeable for everything  Security and supervisor paged to come do full search of room and belongings, nothing found by security  On exam pt diaphoretic, pupils dilated, tachycardic  Will restrict visitors after hours and if behavior continues will completely restrict visitors  PICC line to be left in today and evaluated in am  OK to use as midline for IV antibiotics

## 2019-03-18 LAB
AMPHETAMINES SERPL QL SCN: NEGATIVE
ANION GAP SERPL CALCULATED.3IONS-SCNC: 6 MMOL/L (ref 4–13)
BARBITURATES UR QL: NEGATIVE
BASOPHILS # BLD AUTO: 0.07 THOUSANDS/ΜL (ref 0–0.1)
BASOPHILS NFR BLD AUTO: 1 % (ref 0–1)
BENZODIAZ UR QL: NEGATIVE
BUN SERPL-MCNC: 10 MG/DL (ref 5–25)
CALCIUM SERPL-MCNC: 8.7 MG/DL (ref 8.3–10.1)
CHLORIDE SERPL-SCNC: 101 MMOL/L (ref 100–108)
CO2 SERPL-SCNC: 28 MMOL/L (ref 21–32)
COCAINE UR QL: NEGATIVE
CREAT SERPL-MCNC: 0.69 MG/DL (ref 0.6–1.3)
EOSINOPHIL # BLD AUTO: 0.34 THOUSAND/ΜL (ref 0–0.61)
EOSINOPHIL NFR BLD AUTO: 5 % (ref 0–6)
ERYTHROCYTE [DISTWIDTH] IN BLOOD BY AUTOMATED COUNT: 13.6 % (ref 11.6–15.1)
GFR SERPL CREATININE-BSD FRML MDRD: 120 ML/MIN/1.73SQ M
GLUCOSE SERPL-MCNC: 94 MG/DL (ref 65–140)
HCT VFR BLD AUTO: 39.3 % (ref 36.5–49.3)
HGB BLD-MCNC: 13.1 G/DL (ref 12–17)
IMM GRANULOCYTES # BLD AUTO: 0.03 THOUSAND/UL (ref 0–0.2)
IMM GRANULOCYTES NFR BLD AUTO: 0 % (ref 0–2)
LYMPHOCYTES # BLD AUTO: 2.16 THOUSANDS/ΜL (ref 0.6–4.47)
LYMPHOCYTES NFR BLD AUTO: 31 % (ref 14–44)
MCH RBC QN AUTO: 30.4 PG (ref 26.8–34.3)
MCHC RBC AUTO-ENTMCNC: 33.3 G/DL (ref 31.4–37.4)
MCV RBC AUTO: 91 FL (ref 82–98)
METHADONE UR QL: NEGATIVE
MONOCYTES # BLD AUTO: 0.81 THOUSAND/ΜL (ref 0.17–1.22)
MONOCYTES NFR BLD AUTO: 12 % (ref 4–12)
NEUTROPHILS # BLD AUTO: 3.62 THOUSANDS/ΜL (ref 1.85–7.62)
NEUTS SEG NFR BLD AUTO: 51 % (ref 43–75)
NRBC BLD AUTO-RTO: 0 /100 WBCS
OPIATES UR QL SCN: NEGATIVE
PCP UR QL: NEGATIVE
PLATELET # BLD AUTO: 195 THOUSANDS/UL (ref 149–390)
PMV BLD AUTO: 11.8 FL (ref 8.9–12.7)
POTASSIUM SERPL-SCNC: 4 MMOL/L (ref 3.5–5.3)
RBC # BLD AUTO: 4.31 MILLION/UL (ref 3.88–5.62)
SODIUM SERPL-SCNC: 135 MMOL/L (ref 136–145)
THC UR QL: NEGATIVE
WBC # BLD AUTO: 7.03 THOUSAND/UL (ref 4.31–10.16)

## 2019-03-18 PROCEDURE — 85025 COMPLETE CBC W/AUTO DIFF WBC: CPT | Performed by: INTERNAL MEDICINE

## 2019-03-18 PROCEDURE — 99232 SBSQ HOSP IP/OBS MODERATE 35: CPT | Performed by: INTERNAL MEDICINE

## 2019-03-18 PROCEDURE — 80048 BASIC METABOLIC PNL TOTAL CA: CPT | Performed by: INTERNAL MEDICINE

## 2019-03-18 RX ADMIN — ASPIRIN 81 MG 81 MG: 81 TABLET ORAL at 08:01

## 2019-03-18 RX ADMIN — RIVAROXABAN 15 MG: 15 TABLET, FILM COATED ORAL at 08:01

## 2019-03-18 RX ADMIN — NICOTINE 7 MG: 7 PATCH TRANSDERMAL at 08:04

## 2019-03-18 RX ADMIN — LISINOPRIL 2.5 MG: 2.5 TABLET ORAL at 08:01

## 2019-03-18 RX ADMIN — CEFAZOLIN SODIUM 2000 MG: 2 SOLUTION INTRAVENOUS at 09:33

## 2019-03-18 RX ADMIN — ATORVASTATIN CALCIUM 80 MG: 80 TABLET, FILM COATED ORAL at 16:01

## 2019-03-18 RX ADMIN — CEFAZOLIN SODIUM 2000 MG: 2 SOLUTION INTRAVENOUS at 01:55

## 2019-03-18 RX ADMIN — CEFAZOLIN SODIUM 2000 MG: 2 SOLUTION INTRAVENOUS at 18:38

## 2019-03-18 RX ADMIN — METOPROLOL SUCCINATE 12.5 MG: 25 TABLET, EXTENDED RELEASE ORAL at 08:01

## 2019-03-18 RX ADMIN — RIVAROXABAN 15 MG: 15 TABLET, FILM COATED ORAL at 16:01

## 2019-03-18 NOTE — SOCIAL WORK
Received an email fromChristiana at [employer] ([email or web address]) CSWS requested that CRS give dates and approximate times (morning vs afternoon) of visitation per patient request

## 2019-03-18 NOTE — PROGRESS NOTES
IM Residency Progress Note   Unit/Bed#: [facility] 412-01 Encounter: [id number]  SOD Team B       Sondra London 46 y o  male [id number]    Hospital Stay Days: 6      Assessment/Plan:    1  NSTEMI  ·  metoprolol succinate 12 5 mg daily  · Atorvastatin 80 mg daily  · Aspirin 81 mg daily  · Lisinopril 2 5 mg daily  · CT surgery recommending outpatient CABG after completion of IV antibiotic        2  Acute Subsegmental PE of LLL  · Xarelto 15 mg BID  · Continue to monitor clinically     3  MSSA Bacteremia   · Cefazolin 2000 mg IV q8 hours until 3/22/2019  · Monitor fever curves and WBC count     4  Essential Hypertension  · Lisinopril 2 5 mg daily   · Continue cardiopulmonary monitoring     5  Hyperlipidemia  · Atorvastatin to 80 mg daily     6  Ischemic Cardiomyopathy with an EF of 25%  · Monitor for signs of volume overload  · Will require life vest prior to discharge ; will be delivered on discharge    7  Hepatitis-C  · Hepatitis-C PCR positive genotype 1a  · Will need outpatient follow-up with GI      Disposition: Inpatient      Subjective:   Patient seen and examined at bedside  UDS negative following suspicion of drug use during hospital stay on the morning of 3/17/2019  Denies chest pain, SOB, nausea, vomiting, diarrhea, fevers/chills  Vitals: Temp (24hrs), Av 2 °F (36 8 °C), Min:97 5 °F (36 4 °C), Max:98 7 °F (37 1 °C)  Current: Temperature: 97 5 °F (36 4 °C)  Vitals:    19 2017 19 2311 19 0308 19 0543   BP: 115/59 134/69 109/65    BP Location: Left arm Left arm Left arm    Pulse: 72 74 74    Resp: 18  18    Temp: 98 4 °F (36 9 °C) 98 4 °F (36 9 °C) 97 5 °F (36 4 °C)    TempSrc: Oral Oral Oral    SpO2: 96% 93% 98%    Weight:    100 kg (220 lb 14 4 oz)   Height:        Body mass index is 29 96 kg/m²  I/O last 24 hours: In: 56 [P O :1100; IV Piggyback:50]  Out: 700 [Urine:700]      Physical Exam   Constitutional: He is oriented to person, place, and time   He appears well-developed and well-nourished  No distress  HENT:   Head: Normocephalic and atraumatic  Nose: Nose normal    Mouth/Throat: No oropharyngeal exudate  Eyes: Conjunctivae are normal  Right eye exhibits no discharge  Left eye exhibits no discharge  No scleral icterus  Neck: Neck supple  No JVD present  Cardiovascular: Normal rate, regular rhythm, normal heart sounds and intact distal pulses  Exam reveals no gallop and no friction rub  No murmur heard  Pulmonary/Chest: Effort normal and breath sounds normal  No respiratory distress  He has no wheezes  He has no rales  Abdominal: Soft  Bowel sounds are normal  He exhibits no distension  There is no tenderness  There is no rebound and no guarding  Musculoskeletal: Normal range of motion  He exhibits no edema  Neurological: He is alert and oriented to person, place, and time  Skin: Skin is warm and dry  He is not diaphoretic  No erythema  Psychiatric: He has a normal mood and affect         Invasive Devices     Peripherally Inserted Central Catheter Line            PICC Line 52/05/82 Right Basilic 3 days                Labs:   Recent Results (from the past 24 hour(s))   Rapid drug screen, urine    Collection Time: 03/17/19 11:26 PM   Result Value Ref Range    Amph/Meth UR Negative Negative    Barbiturate Ur Negative Negative    Benzodiazepine Urine Negative Negative    Cocaine Urine Negative Negative    Methadone Urine Negative Negative    Opiate Urine Negative Negative    PCP Ur Negative Negative    THC Urine Negative Negative   CBC and differential    Collection Time: 03/18/19  5:20 AM   Result Value Ref Range    WBC 7 03 4 31 - 10 16 Thousand/uL    RBC 4 31 3 88 - 5 62 Million/uL    Hemoglobin 13 1 12 0 - 17 0 g/dL    Hematocrit 39 3 36 5 - 49 3 %    MCV 91 82 - 98 fL    MCH 30 4 26 8 - 34 3 pg    MCHC 33 3 31 4 - 37 4 g/dL    RDW 13 6 11 6 - 15 1 %    MPV 11 8 8 9 - 12 7 fL    Platelets 652 675 - 362 Thousands/uL    nRBC 0 /100 WBCs Neutrophils Relative 51 43 - 75 %    Immat GRANS % 0 0 - 2 %    Lymphocytes Relative 31 14 - 44 %    Monocytes Relative 12 4 - 12 %    Eosinophils Relative 5 0 - 6 %    Basophils Relative 1 0 - 1 %    Neutrophils Absolute 3 62 1 85 - 7 62 Thousands/µL    Immature Grans Absolute 0 03 0 00 - 0 20 Thousand/uL    Lymphocytes Absolute 2 16 0 60 - 4 47 Thousands/µL    Monocytes Absolute 0 81 0 17 - 1 22 Thousand/µL    Eosinophils Absolute 0 34 0 00 - 0 61 Thousand/µL    Basophils Absolute 0 07 0 00 - 0 10 Thousands/µL   Basic metabolic panel    Collection Time: 03/18/19  5:20 AM   Result Value Ref Range    Sodium 135 (L) 136 - 145 mmol/L    Potassium 4 0 3 5 - 5 3 mmol/L    Chloride 101 100 - 108 mmol/L    CO2 28 21 - 32 mmol/L    ANION GAP 6 4 - 13 mmol/L    BUN 10 5 - 25 mg/dL    Creatinine 0 69 0 60 - 1 30 mg/dL    Glucose 94 65 - 140 mg/dL    Calcium 8 7 8 3 - 10 1 mg/dL    eGFR 120 ml/min/1 73sq m       Radiology Results: I have personally reviewed pertinent reports  Other Diagnostic Testing:   I have personally reviewed pertinent reports          Active Meds:   Current Facility-Administered Medications   Medication Dose Route Frequency    aspirin chewable tablet 81 mg  81 mg Oral Daily    atorvastatin (LIPITOR) tablet 80 mg  80 mg Oral Daily With Dinner    ceFAZolin (ANCEF) IVPB (premix) 2,000 mg  2,000 mg Intravenous Q8H    guaiFENesin (MUCINEX) 12 hr tablet 600 mg  600 mg Oral Q12H PRN    lisinopril (ZESTRIL) tablet 2 5 mg  2 5 mg Oral Daily    metoprolol succinate (TOPROL-XL) 24 hr tablet 12 5 mg  12 5 mg Oral Daily    nicotine (NICODERM CQ) 7 mg/24hr TD 24 hr patch 7 mg  7 mg Transdermal Daily    rivaroxaban (XARELTO) tablet 15 mg  15 mg Oral BID With Meals         VTE Pharmacologic Prophylaxis: Reason for no pharmacologic prophylaxis DOAC  VTE Mechanical Prophylaxis: sequential compression device    Dawson Scott DO

## 2019-03-18 NOTE — PROGRESS NOTES
Progress Note - Infectious Disease   Scarlett Stubbs 46 y o  male MRN: [id number]  Unit/Bed#: [facility] 412-01 Encounter: [id number]      Impression/Plan:  1  Sepsis-POA   Leukocytosis and tachycardia on admission at [facility] to [address] [place] [address] [address] that this is related to patient's drug use   The patient remains hemodynamically stable and nontoxic despite his initial systemic illness   Patient currently tolerating antibiotics  -continue cefazolin through 3/22 to complete 2 weeks   -monitor CBC with diff and creatinine weekly  -repeat labs again on 03/20     2  MSSA bacteremia:  Suspect this is related to the patient's polysubstance abuse along with multiple scabs on his skin   Although only 1 set is positive, consider to be clinically relevant given his acute status on presentation   Consideration for the possibility of endocarditis   Transthoracic and transesophageal echocardiograms are both negative for valvular vegetations   As this was a low-grade, transient bacteremia at worst, very unlikely that this represents an endovascular infection  -antibiotics as above  -plan for 2 weeks of antibiotics as above  -patient is status post PICC line     3  Injection drug abuse-patient previously denying he is currently using, however the current bacteremia is suspicious for ongoing drug use   Patient's U tox was noted to be positive on admission and he admitted today to snorting heroin    Complicated by hepatitis-C   HIV screen negative   -the patient is not a candidate for home intravenous antibiotics      4  Hepatitis C-in the setting of injection drug use   Liver function tests remain abnormal   Hepatitis C RNA testing positive   -monitor liver function test  -patient may benefit from hep C therapy along with drug rehab     5   Abnormal CT of the chest-the ground-glass changes are not really consistent with an acute bacterial pneumonia   Possibly a transient aspiration pneumonitis   Possibly this is a toxin event to the lungs in the setting of active drug use  The patient's respiratory status has improved  Repeat chest x-ray without acute findings  -no need for antibiotics for this issue  -monitor respiratory status     6  Pulmonary embolism-anticoagulation as per the primary service     7  Cardiomyopathy-unclear etiology   Potentially related to patient's drug use   Cardiac catheterization with significant disease  -CT surgery evaluation noted  -patient plan for eventual CABG as outpatient      Above plan discussed in detail with the patient  ID consult service will continue to follow the patient is admitted  Antibiotics:  Ancef    24 hour events:  No acute events noted overnight on chart review  Patient is currently afebrile  White blood cell count 7 0  Patient's other vitals are stable  Repeat urine drug screen negative    Subjective:  Patient seen and examined at bedside in currently denies having any nausea, vomiting, chest pain or shortness of breath  He denies having any pain or swelling in his PICC line site  He is currently tolerating antibiotic infusion without acute issue  Objective:  Vitals:  Temp:  [97 5 °F (36 4 °C)-98 7 °F (37 1 °C)] 98 °F (36 7 °C)  HR:  [] 71  Resp:  [18-20] 18  BP: (109-134)/(59-83) 123/73  SpO2:  [93 %-98 %] 98 %  Temp (24hrs), Av 1 °F (36 7 °C), Min:97 5 °F (36 4 °C), Max:98 7 °F (37 1 °C)  Current: Temperature: 98 °F (36 7 °C)    Physical Exam:   General Appearance:  Alert, interactive, nontoxic, no acute distress  Throat: Oropharynx moist without lesions  Poor dentition noted  Lungs:   Clear to auscultation bilaterally; no wheezes, rhonchi or rales; respirations unlabored   Heart:  RRR; no rub or gallop; systolic murmur present  Abdomen:   Soft, non-tender, non-distended, positive bowel sounds  Extremities: No clubbing, cyanosis or edema   Skin: No new rashes or lesions  No new draining wounds noted    PICC site without any erythema or induration  Labs, Imaging, & Other studies:   All pertinent labs and imaging studies were personally reviewed  Results from last 7 days   Lab Units 03/18/19  0520 03/16/19  0429 03/15/19  0433 03/14/19  0446   WBC Thousand/uL 7 03 7 61  --  7 53   HEMOGLOBIN g/dL 13 1 14 7 13 8 14 4   PLATELETS Thousands/uL 195 241  --  224     Results from last 7 days   Lab Units 03/18/19  0520  03/12/19  0503   POTASSIUM mmol/L 4 0   < > 4 3   CHLORIDE mmol/L 101   < > 106   CO2 mmol/L 28   < > 25   BUN mg/dL 10   < > 10   CREATININE mg/dL 0 69   < > 0 69   EGFR ml/min/1 73sq m 120   < > 120   CALCIUM mg/dL 8 7   < > 8 6   AST U/L  --   --  82*   ALT U/L  --   --  124*   ALK PHOS U/L  --   --  64    < > = values in this interval not displayed       Results from last 7 days   Lab Units 03/13/19  1459   MRSA CULTURE ONLY  No Methicillin Resistant Staphlyococcus aureus (MRSA) isolated

## 2019-03-19 RX ADMIN — CEFAZOLIN SODIUM 2000 MG: 2 SOLUTION INTRAVENOUS at 16:50

## 2019-03-19 RX ADMIN — METOPROLOL SUCCINATE 12.5 MG: 25 TABLET, EXTENDED RELEASE ORAL at 09:02

## 2019-03-19 RX ADMIN — RIVAROXABAN 15 MG: 15 TABLET, FILM COATED ORAL at 09:02

## 2019-03-19 RX ADMIN — CEFAZOLIN SODIUM 2000 MG: 2 SOLUTION INTRAVENOUS at 09:04

## 2019-03-19 RX ADMIN — CEFAZOLIN SODIUM 2000 MG: 2 SOLUTION INTRAVENOUS at 01:10

## 2019-03-19 RX ADMIN — ATORVASTATIN CALCIUM 80 MG: 80 TABLET, FILM COATED ORAL at 16:50

## 2019-03-19 RX ADMIN — ASPIRIN 81 MG 81 MG: 81 TABLET ORAL at 09:02

## 2019-03-19 RX ADMIN — NICOTINE 7 MG: 7 PATCH TRANSDERMAL at 09:03

## 2019-03-19 RX ADMIN — RIVAROXABAN 15 MG: 15 TABLET, FILM COATED ORAL at 16:50

## 2019-03-19 NOTE — PROGRESS NOTES
IM Residency Progress Note   Unit/Bed#: [facility] 412-01 Encounter: [id number]  SOD Team B       Marlee Petersen 46 y o  male [id number]    Hospital Stay Days: 7      Assessment/Plan:    1  NSTEMI  · Metoprolol succinate 12 5 mg daily  · Atorvastatin 80 mg daily  · Aspirin 81 mg daily  · Lisinopril 2 5 mg daily  · CT surgery recommending outpatient CABG after completion of IV antibiotic     2  Acute Subsegmental PE of LLL  · Xarelto 15 mg BID  · Continue to monitor clinically     3  MSSA Bacteremia   · Cefazolin 2000 mg IV q8 hours until 3/22/2019  · Monitor fever curves and WBC count     4  Essential Hypertension  · Lisinopril 2 5 mg daily   · Continue cardiopulmonary monitoring     5  Hyperlipidemia  · Atorvastatin to 80 mg daily     6  Ischemic Cardiomyopathy with an EF of 25%  · Monitor for signs of volume overload  · Will require life vest prior to discharge ; will be delivered on discharge    7  Hepatitis-C  · Hepatitis-C PCR positive genotype 1a  · Will need outpatient follow-up with GI      Disposition: Inpatient      Subjective:   Patient seen and examined at bedside  No acute events overnight  Denies chest pain, SOB, nausea, vomiting, diarrhea, fevers/chills  Vitals: Temp (24hrs), Av 2 °F (36 8 °C), Min:97 7 °F (36 5 °C), Max:98 6 °F (37 °C)  Current: Temperature: 97 7 °F (36 5 °C)  Vitals:    19 2019 19 2314 19 0247 19 0600   BP: 99/55 111/56 100/56    BP Location: Left arm Left arm Left arm    Pulse: 64 62 62    Resp:     Temp: 98 6 °F (37 °C) 97 9 °F (36 6 °C) 97 7 °F (36 5 °C)    TempSrc: Oral Oral Oral    SpO2: 97% 96% 96%    Weight:    98 7 kg (217 lb 11 2 oz)   Height:        Body mass index is 29 53 kg/m²  I/O last 24 hours: In: 618 3 [P O :440; IV Piggyback:178 3]  Out: -       Physical Exam   Constitutional: He is oriented to person, place, and time  He appears well-developed and well-nourished  No distress     HENT:   Head: Normocephalic and atraumatic  Nose: Nose normal    Mouth/Throat: No oropharyngeal exudate  Eyes: Conjunctivae are normal  Right eye exhibits no discharge  Left eye exhibits no discharge  No scleral icterus  Neck: Neck supple  No JVD present  Cardiovascular: Normal rate, regular rhythm, normal heart sounds and intact distal pulses  Exam reveals no gallop and no friction rub  No murmur heard  Pulmonary/Chest: Effort normal and breath sounds normal  No respiratory distress  He has no wheezes  He has no rales  Abdominal: Soft  Bowel sounds are normal  He exhibits no distension  There is no tenderness  There is no rebound and no guarding  Musculoskeletal: Normal range of motion  He exhibits no edema  Neurological: He is alert and oriented to person, place, and time  Skin: Skin is warm and dry  He is not diaphoretic  No erythema  Psychiatric: He has a normal mood and affect  Invasive Devices     Peripherally Inserted Central Catheter Line            PICC Line 98/31/21 Right Basilic 4 days                Labs:   No results found for this or any previous visit (from the past 24 hour(s))  Radiology Results: I have personally reviewed pertinent reports  Other Diagnostic Testing:   I have personally reviewed pertinent reports          Active Meds:   Current Facility-Administered Medications   Medication Dose Route Frequency    aspirin chewable tablet 81 mg  81 mg Oral Daily    atorvastatin (LIPITOR) tablet 80 mg  80 mg Oral Daily With Dinner    ceFAZolin (ANCEF) IVPB (premix) 2,000 mg  2,000 mg Intravenous Q8H    guaiFENesin (MUCINEX) 12 hr tablet 600 mg  600 mg Oral Q12H PRN    lisinopril (ZESTRIL) tablet 2 5 mg  2 5 mg Oral Daily    metoprolol succinate (TOPROL-XL) 24 hr tablet 12 5 mg  12 5 mg Oral Daily    nicotine (NICODERM CQ) 7 mg/24hr TD 24 hr patch 7 mg  7 mg Transdermal Daily    rivaroxaban (XARELTO) tablet 15 mg  15 mg Oral BID With Meals         VTE Pharmacologic Prophylaxis: Reason for no pharmacologic prophylaxis DOAC  VTE Mechanical Prophylaxis: sequential compression device    Jean Carlos Thelma, DO

## 2019-03-19 NOTE — UTILIZATION REVIEW
Continued Stay Review    Date: 3-19-19      Assessment/Plan:     46year old female with NSTEMI CT surgery recommends CABG after she completes this course of iv antibiotics for MSSA bacteremia  Patient is refusing drug rehab  Representative from [employer] [employer] will meet with patient tomorrow to discuss their substance abuse program       Vital Signs: /58 (BP Location: Left arm) Comment: Map 74  Pulse 59   Temp 98 6 °F (37 °C) (Oral)   Resp 18   Ht 6' (1 829 m)   Wt 98 7 kg (217 lb 11 2 oz)   SpO2 97%   BMI 29 53 kg/m²     Medications:     aspirin 81 mg Oral Daily   atorvastatin 80 mg Oral Daily With Dinner   cefazolin 2,000 mg Intravenous Q8H   guaiFENesin 600 mg Oral Q12H PRN   lisinopril 2 5 mg Oral Daily   metoprolol succinate 12 5 mg Oral Daily   nicotine 7 mg Transdermal Daily   rivaroxaban 15 mg Oral BID With Meals       Discharge Plan:   To be determined

## 2019-03-19 NOTE — SOCIAL WORK
CSWS spoke with Patrick Holland at [employer] ([phone]), he will be out tomorrow to see patient  CSWS updated patient  Patient remains not interested in substance abuse rehab  Prefers to discharge home

## 2019-03-20 DIAGNOSIS — B95.61 BACTEREMIA DUE TO STAPHYLOCOCCUS AUREUS: Primary | ICD-10-CM

## 2019-03-20 DIAGNOSIS — R78.81 BACTEREMIA DUE TO STAPHYLOCOCCUS AUREUS: Primary | ICD-10-CM

## 2019-03-20 LAB
ANION GAP SERPL CALCULATED.3IONS-SCNC: 0 MMOL/L (ref 4–13)
BASOPHILS # BLD AUTO: 0.08 THOUSANDS/ΜL (ref 0–0.1)
BASOPHILS NFR BLD AUTO: 1 % (ref 0–1)
BUN SERPL-MCNC: 10 MG/DL (ref 5–25)
CALCIUM SERPL-MCNC: 8.8 MG/DL (ref 8.3–10.1)
CHLORIDE SERPL-SCNC: 102 MMOL/L (ref 100–108)
CO2 SERPL-SCNC: 32 MMOL/L (ref 21–32)
CREAT SERPL-MCNC: 0.67 MG/DL (ref 0.6–1.3)
EOSINOPHIL # BLD AUTO: 0.37 THOUSAND/ΜL (ref 0–0.61)
EOSINOPHIL NFR BLD AUTO: 5 % (ref 0–6)
ERYTHROCYTE [DISTWIDTH] IN BLOOD BY AUTOMATED COUNT: 13.4 % (ref 11.6–15.1)
GFR SERPL CREATININE-BSD FRML MDRD: 122 ML/MIN/1.73SQ M
GLUCOSE SERPL-MCNC: 92 MG/DL (ref 65–140)
HCT VFR BLD AUTO: 42.5 % (ref 36.5–49.3)
HGB BLD-MCNC: 14 G/DL (ref 12–17)
IMM GRANULOCYTES # BLD AUTO: 0.02 THOUSAND/UL (ref 0–0.2)
IMM GRANULOCYTES NFR BLD AUTO: 0 % (ref 0–2)
LYMPHOCYTES # BLD AUTO: 2.39 THOUSANDS/ΜL (ref 0.6–4.47)
LYMPHOCYTES NFR BLD AUTO: 34 % (ref 14–44)
MCH RBC QN AUTO: 30.4 PG (ref 26.8–34.3)
MCHC RBC AUTO-ENTMCNC: 32.9 G/DL (ref 31.4–37.4)
MCV RBC AUTO: 92 FL (ref 82–98)
MONOCYTES # BLD AUTO: 0.68 THOUSAND/ΜL (ref 0.17–1.22)
MONOCYTES NFR BLD AUTO: 10 % (ref 4–12)
NEUTROPHILS # BLD AUTO: 3.57 THOUSANDS/ΜL (ref 1.85–7.62)
NEUTS SEG NFR BLD AUTO: 50 % (ref 43–75)
NRBC BLD AUTO-RTO: 0 /100 WBCS
PLATELET # BLD AUTO: 202 THOUSANDS/UL (ref 149–390)
PMV BLD AUTO: 12.2 FL (ref 8.9–12.7)
POTASSIUM SERPL-SCNC: 4.4 MMOL/L (ref 3.5–5.3)
RBC # BLD AUTO: 4.61 MILLION/UL (ref 3.88–5.62)
SODIUM SERPL-SCNC: 134 MMOL/L (ref 136–145)
WBC # BLD AUTO: 7.11 THOUSAND/UL (ref 4.31–10.16)

## 2019-03-20 PROCEDURE — 99232 SBSQ HOSP IP/OBS MODERATE 35: CPT | Performed by: INTERNAL MEDICINE

## 2019-03-20 PROCEDURE — 80048 BASIC METABOLIC PNL TOTAL CA: CPT | Performed by: INTERNAL MEDICINE

## 2019-03-20 PROCEDURE — 85025 COMPLETE CBC W/AUTO DIFF WBC: CPT | Performed by: INTERNAL MEDICINE

## 2019-03-20 RX ADMIN — RIVAROXABAN 15 MG: 15 TABLET, FILM COATED ORAL at 16:13

## 2019-03-20 RX ADMIN — ATORVASTATIN CALCIUM 80 MG: 80 TABLET, FILM COATED ORAL at 16:13

## 2019-03-20 RX ADMIN — NICOTINE 7 MG: 7 PATCH TRANSDERMAL at 08:42

## 2019-03-20 RX ADMIN — CEFAZOLIN SODIUM 2000 MG: 2 SOLUTION INTRAVENOUS at 08:42

## 2019-03-20 RX ADMIN — LISINOPRIL 2.5 MG: 2.5 TABLET ORAL at 16:13

## 2019-03-20 RX ADMIN — RIVAROXABAN 15 MG: 15 TABLET, FILM COATED ORAL at 08:42

## 2019-03-20 RX ADMIN — ASPIRIN 81 MG 81 MG: 81 TABLET ORAL at 08:42

## 2019-03-20 RX ADMIN — CEFAZOLIN SODIUM 2000 MG: 2 SOLUTION INTRAVENOUS at 16:13

## 2019-03-20 RX ADMIN — CEFAZOLIN SODIUM 2000 MG: 2 SOLUTION INTRAVENOUS at 00:35

## 2019-03-20 NOTE — SOCIAL WORK
Plan is for discharge home on 3/22/19 after finishing IV antibiotic  [employer] life vest ordered and will be delivered by 3/22/19  No longer has need for [facility] and referral cancelled

## 2019-03-20 NOTE — PROGRESS NOTES
Heart Failure Service Progress Note - Larisa Waters 46 y o  male MRN: [id number]    Unit/Bed#: [facility] 412-01 Encounter: [id number]      Assessment:    Principal Problem:    NSTEMI (non-ST elevated myocardial infarction) ([facility] 75 )  Active Problems:    Ischemic cardiomyopathy (severe EF 25%)    Closed traumatic nondisplaced fracture of one rib of right side    Acute subsegmental PE of LLE    Tobacco abuse    Bacteremia due to Staphylococcus aureus (MSSA)    Hepatitis C    TSH elevation    Hyperlipidemia    Essential hypertension    CAD (coronary artery disease)    Polysubstance abuse ([facility] 75 )    On continuous oral anticoagulation    Transaminitis      Subjective:   Patient seen and examined  No significant events overnight  Objective: Intake/ Output: not done  Weight: 217- stable  Tele:     # Newly diagnosed HFrEF, LVEF 25-30%, LVIDd 5 5 cm, NYHA II/III, ACC/AHA Stage C: Most likely driven by iCM given severe CAD as noted below  Possible component of toxin induced (+ meth on admission, but states this is first time)  Less likely stress and/or myocarditis  Will proceed with the following:  Diag:  --TTE 3/7/19: LVEF 25-30%, LVIDd 5 5 cm  Mildly reduced RVSF w/ mild RVE  LAE>GAIL  Trace TR  No significant valvular disease  --COLEMAN 3/11/19: No clear vegetations noted  --LHC: 90% prox LAD  100% Mid LAD  w/ R->L collaterals   80% prox RI  90% midRCA    --CT PE 3/6/19: Subsegmental LL PE    Neurohormonal Blockade:  --Beta-Blocker: metoprolol succinate 12 5 mg daily  --ACEi, ARB or ARNi: lisinopril 2 5 mg PO daily  --Aldosterone Receptor Blocker: Start spironolactone 12 5 mg PO daily tomorrow if SBP>100 mmHg  --Diuretic: Currently not requiring diuretics     Sudden Cardiac Death Risk Reduction:  --ICD: Given iCM, would favor LifeVest on D/C if patient is agreeable unless he has revascularization during this admission     Electrical Resynchronization:  --Candidacy for BiV device: Narrow QRS     Advanced Therapies: Will continue to monitor  Given recent heroin/meth abuse, not currently a candidate        # MSSA Bacteremia: Currently on Abx  No e/o endocarditis on COLEMAN  Cefazolin through 3/22 to complete 2 weeks of IV  # Severe 3v CAD: 90% prox LAD  100% Mid LAD  w/ R->L collaterals  80% prox RI  90% midRCA  # Injection drug abuse-patient previously denying he is currently using, however the current bacteremia is suspicious for ongoing drug use    # LLL subsegmental PE: Continue Xarelto for now; but can consider transition to Lovenox vs hep gtt given ongoing CT surgery workup  # HLD: atorvastatin 80 mg  # Hep C- PCR positive genotype 1a      Plan:  CT surgery as outpt or per CT surgery preferences  If leaving then LifeVest can be set up- we can do    [employer] (day, reason): Jensen catheter (day, reason):    Vitals: Blood pressure 100/64, pulse 70, temperature 98 °F (36 7 °C), temperature source Oral, resp  rate 18, height 6' (1 829 m), weight 98 4 kg (217 lb), SpO2 96 %  , Body mass index is 29 43 kg/m² , I/O last 3 completed shifts: In: 328 3 [P O :200; IV Piggyback:128 3]  Out: 1000 [Urine:1000]  No intake/output data recorded  Wt Readings from Last 3 Encounters:   03/20/19 98 4 kg (217 lb)   03/07/19 102 kg (224 lb)       Intake/Output Summary (Last 24 hours) at 3/20/2019 0812  Last data filed at 3/20/2019 0630  Gross per 24 hour   Intake 0 ml   Output 1000 ml   Net -1000 ml     I/O last 3 completed shifts: In: 328 3 [P O :200; IV Piggyback:128 3]  Out: 1000 [Urine:1000]    No significant arrhythmias seen on telemetry review         Physical Exam:  Vitals:    03/20/19 0345 03/20/19 0538 03/20/19 0600 03/20/19 0751   BP: 101/63   100/64   BP Location: Left arm   Left arm   Pulse: (!) 52   70   Resp: 18   18   Temp: 98 2 °F (36 8 °C)   98 °F (36 7 °C)   TempSrc: Oral   Oral   SpO2: 98%   96%   Weight:  98 4 kg (217 lb) 98 4 kg (217 lb)    Height:           GEN: Jj Becker appears well, alert and oriented x 3, pleasant and cooperative   HEENT: pupils equal, round, and reactive to light; extraocular muscles intact  NECK: supple, no carotid bruits   HEART: regular rhythm, normal S1 and S2, no murmurs, clicks, gallops or rubs, JVP is    LUNGS: clear to auscultation bilaterally; no wheezes, rales, or rhonchi   ABDOMEN: normal bowel sounds, soft, no tenderness, no distention  EXTREMITIES: peripheral pulses normal; no clubbing, cyanosis, or edema  NEURO: no focal findings   SKIN: normal without suspicious lesions on exposed skin      Current Facility-Administered Medications:     aspirin chewable tablet 81 mg, 81 mg, Oral, Daily, Frederick Penny MD, 81 mg at 03/19/19 0902    atorvastatin (LIPITOR) tablet 80 mg, 80 mg, Oral, Daily With Zuri Delgado MD, 80 mg at 03/19/19 1650    ceFAZolin (ANCEF) IVPB (premix) 2,000 mg, 2,000 mg, Intravenous, Q8H, Khushi Barrientos CRNA, Last Rate: 100 mL/hr at 03/20/19 0035, 2,000 mg at 03/20/19 0035    guaiFENesin (MUCINEX) 12 hr tablet 600 mg, 600 mg, Oral, Q12H PRN, Vern Sow MD    lisinopril (ZESTRIL) tablet 2 5 mg, 2 5 mg, Oral, Daily, Darya Zamarripa MD, 2 5 mg at 03/18/19 0801    metoprolol succinate (TOPROL-XL) 24 hr tablet 12 5 mg, 12 5 mg, Oral, Daily, Darya Zamarripa MD, 12 5 mg at 03/19/19 0902    nicotine (NICODERM CQ) 7 mg/24hr TD 24 hr patch 7 mg, 7 mg, Transdermal, Daily, Frederick Penny MD, 7 mg at 03/19/19 0672    rivaroxaban (XARELTO) tablet 15 mg, 15 mg, Oral, BID With Meals, Frederick Penny MD, 15 mg at 03/19/19 1650      Labs & Results:        Results from last 7 days   Lab Units 03/20/19  0448 03/18/19  0520 03/16/19  0429   WBC Thousand/uL 7 11 7 03 7 61   HEMOGLOBIN g/dL 14 0 13 1 14 7   HEMATOCRIT % 42 5 39 3 44 4   PLATELETS Thousands/uL 202 195 241         Results from last 7 days   Lab Units 03/20/19  0448 03/18/19  0520 03/16/19  0429   POTASSIUM mmol/L 4 4 4 0 3 9   CHLORIDE mmol/L 102 101 107   CO2 mmol/L 32 28 25   BUN mg/dL 10 10 11 CREATININE mg/dL 0 67 0 69 0 64   CALCIUM mg/dL 8 8 8 7 8 6           Counseling / Coordination of Care  Total floor / unit time spent today 25 minutes  Greater than 50% of total time was spent with the patient and / or family counseling and / or coordination of care  A description of the counseling / coordination of care: 15  Thank you for the opportunity to participate in the care of this patient  Rey LU    Director Heart Failure/ Medical Director [address] [place] [address]

## 2019-03-20 NOTE — PROGRESS NOTES
Progress Note - Infectious Disease   Omar Garcia 46 y o  male MRN: [id number]  Unit/Bed#: [facility] 412-01 Encounter: [id number]      Impression/Plan:  1  Sepsis-POA   Leukocytosis and tachycardia on admission at [facility] to [address] [place] [address] [address] that this is related to patient's drug use   The patient remains hemodynamically stable and nontoxic despite his initial systemic illness   Patient currently tolerating antibiotics  -continue cefazolin through 3/22 to complete 2 weeks   -continue to trend fever curve/vitals     2  MSSA bacteremia:  Suspect this is related to the patient's polysubstance abuse along with multiple scabs on his skin   Although only 1 set is positive, consider to be clinically relevant given his acute status on presentation   Consideration for the possibility of endocarditis   Transthoracic and transesophageal echocardiograms are both negative for valvular vegetations   As this was a low-grade, transient bacteremia at worst, very unlikely that this represents an endovascular infection  -antibiotics as above  -plan for 2 weeks of antibiotics as above  -pull PICC line after last dose of antibiotic on 03/22  -will message office staff to obtain surveillance blood cultures 1 week after completing antibiotics  -patient will not require formal follow-up in the ID office  -patient is cleared from an ID standpoint for discharge after last dose of antibiotic on 03/22     3  Injection drug abuse-patient previously denying he is currently using, however the current bacteremia is suspicious for ongoing drug use   Patient's U tox was noted to be positive on admission and he admitted today to snorting heroin    Complicated by hepatitis-C   HIV screen negative   -the patient is not a candidate for home intravenous antibiotics      4   Hepatitis C-in the setting of injection drug use   Liver function tests remain abnormal   Hepatitis C RNA testing positive   -monitor liver function test  -patient may benefit from hep C therapy along with drug rehab     5  Abnormal CT of the chest-the ground-glass changes are not really consistent with an acute bacterial pneumonia   Possibly a transient aspiration pneumonitis   Possibly this is a toxin event to the lungs in the setting of active drug use  The patient's respiratory status has improved  Repeat chest x-ray without acute findings  -no need for antibiotics for this issue  -monitor respiratory status     6  Pulmonary embolism-anticoagulation as per the primary service     7  Cardiomyopathy-unclear etiology   Potentially related to patient's drug use   Cardiac catheterization with significant disease  -CT surgery evaluation noted  -patient planned for eventual CABG as outpatient      Above plan discussed in detail with the patient       ID consult service will continue to follow peripherally while the patient is admitted  Antibiotics:  Ancef    24 hour events:  No acute events noted overnight on chart review  Patient is currently afebrile  White count 7 1  No new culture data  Patient's other vitals are stable  Creatinine and CBC otherwise stable    Subjective:  Patient seen at bedside and currently denies having any nausea, vomiting, chest pain or shortness of breath  He is currently tolerating antibiotics without acute issue  Objective:  Vitals:  Temp:  [97 9 °F (36 6 °C)-98 6 °F (37 °C)] 98 °F (36 7 °C)  HR:  [52-70] 70  Resp:  [18] 18  BP: (100-111)/(57-64) 100/64  SpO2:  [96 %-98 %] 96 %  Temp (24hrs), Av 2 °F (36 8 °C), Min:97 9 °F (36 6 °C), Max:98 6 °F (37 °C)  Current: Temperature: 98 °F (36 7 °C)    Physical Exam:   General Appearance:  Alert, interactive, nontoxic, no acute distress  Throat: Oropharynx moist without lesions    Poor dentition noted   Lungs:   Clear to auscultation bilaterally; no wheezes, rhonchi or rales; respirations unlabored   Heart:  RRR; no rub or gallop; systolic murmur present   Abdomen:   Soft, non-tender, non-distended, positive bowel sounds  Extremities: No clubbing, cyanosis or edema   Skin: No new rashes or lesions  No new draining wounds noted         Labs, Imaging, & Other studies:   All pertinent labs and imaging studies were personally reviewed  Results from last 7 days   Lab Units 03/20/19  0448 03/18/19  0520 03/16/19  0429   WBC Thousand/uL 7 11 7 03 7 61   HEMOGLOBIN g/dL 14 0 13 1 14 7   PLATELETS Thousands/uL 202 195 241     Results from last 7 days   Lab Units 03/20/19  0448   POTASSIUM mmol/L 4 4   CHLORIDE mmol/L 102   CO2 mmol/L 32   BUN mg/dL 10   CREATININE mg/dL 0 67   EGFR ml/min/1 73sq m 122   CALCIUM mg/dL 8 8     Results from last 7 days   Lab Units 03/13/19  1459   MRSA CULTURE ONLY  No Methicillin Resistant Staphlyococcus aureus (MRSA) isolated

## 2019-03-20 NOTE — PROGRESS NOTES
IM Residency Progress Note   Unit/Bed#: [facility] 412-01 Encounter: [id number]  SOD Team B       Justin Albright 46 y o  male [id number]    Hospital Stay Days: 8      Assessment/Plan:    1  NSTEMI  · Metoprolol succinate 12 5 mg daily  · Atorvastatin 80 mg daily  · Aspirin 81 mg daily  · Lisinopril 2 5 mg daily  · CT surgery recommending outpatient CABG after completion of IV antibiotics     2  Acute Subsegmental PE of LLL  · Xarelto 15 mg BID  · Continue to monitor clinically     3  MSSA Bacteremia   · Cefazolin 2000 mg IV q8 hours until 3/22/2019  · Monitor fever curves and WBC count     4  Essential Hypertension  · Lisinopril 2 5 mg daily   · Continue cardiopulmonary monitoring     5  Hyperlipidemia  · Atorvastatin to 80 mg daily     6  Ischemic Cardiomyopathy with an EF of 25%  · Monitor for signs of volume overload  · Will require life vest prior to discharge ; will be delivered on discharge    7  Hepatitis-C  · Hepatitis-C PCR positive genotype 1a  · Will need outpatient follow-up with GI      Disposition: Inpatient      Subjective:   Patient seen and examined at bedside  No acute events overnight  Denies chest pain, SOB, nausea, vomiting, diarrhea, fevers/chills  Vitals: Temp (24hrs), Av 2 °F (36 8 °C), Min:97 9 °F (36 6 °C), Max:98 6 °F (37 °C)  Current: Temperature: 98 2 °F (36 8 °C)  Vitals:    19 0032 19 0345 19 0538 19 0600   BP: 104/61 101/63     BP Location: Left arm Left arm     Pulse: 55 (!) 52     Resp: 18 18     Temp: 97 9 °F (36 6 °C) 98 2 °F (36 8 °C)     TempSrc: Oral Oral     SpO2: 98% 98%     Weight:   98 4 kg (217 lb) 98 4 kg (217 lb)   Height:        Body mass index is 29 43 kg/m²  I/O last 24 hours: In: 0   Out: 1000 [Urine:1000]      Physical Exam   Constitutional: He is oriented to person, place, and time  He appears well-developed and well-nourished  No distress  HENT:   Head: Normocephalic and atraumatic     Nose: Nose normal    Mouth/Throat: No oropharyngeal exudate  Eyes: Conjunctivae are normal  Right eye exhibits no discharge  Left eye exhibits no discharge  No scleral icterus  Neck: Neck supple  No JVD present  Cardiovascular: Normal rate, regular rhythm, normal heart sounds and intact distal pulses  Exam reveals no gallop and no friction rub  No murmur heard  Pulmonary/Chest: Effort normal and breath sounds normal  No respiratory distress  He has no wheezes  He has no rales  Abdominal: Soft  Bowel sounds are normal  He exhibits no distension  There is no tenderness  There is no rebound and no guarding  Musculoskeletal: Normal range of motion  He exhibits no edema  Neurological: He is alert and oriented to person, place, and time  Skin: Skin is warm and dry  He is not diaphoretic  No erythema  Psychiatric: He has a normal mood and affect         Invasive Devices     Peripherally Inserted Central Catheter Line            PICC Line 82/66/27 Right Basilic 5 days                Labs:   Recent Results (from the past 24 hour(s))   CBC and differential    Collection Time: 03/20/19  4:48 AM   Result Value Ref Range    WBC 7 11 4 31 - 10 16 Thousand/uL    RBC 4 61 3 88 - 5 62 Million/uL    Hemoglobin 14 0 12 0 - 17 0 g/dL    Hematocrit 42 5 36 5 - 49 3 %    MCV 92 82 - 98 fL    MCH 30 4 26 8 - 34 3 pg    MCHC 32 9 31 4 - 37 4 g/dL    RDW 13 4 11 6 - 15 1 %    MPV 12 2 8 9 - 12 7 fL    Platelets 178 648 - 343 Thousands/uL    nRBC 0 /100 WBCs    Neutrophils Relative 50 43 - 75 %    Immat GRANS % 0 0 - 2 %    Lymphocytes Relative 34 14 - 44 %    Monocytes Relative 10 4 - 12 %    Eosinophils Relative 5 0 - 6 %    Basophils Relative 1 0 - 1 %    Neutrophils Absolute 3 57 1 85 - 7 62 Thousands/µL    Immature Grans Absolute 0 02 0 00 - 0 20 Thousand/uL    Lymphocytes Absolute 2 39 0 60 - 4 47 Thousands/µL    Monocytes Absolute 0 68 0 17 - 1 22 Thousand/µL    Eosinophils Absolute 0 37 0 00 - 0 61 Thousand/µL    Basophils Absolute 0 08 0 00 - 0 10 Thousands/µL   Basic metabolic panel    Collection Time: 03/20/19  4:48 AM   Result Value Ref Range    Sodium 134 (L) 136 - 145 mmol/L    Potassium 4 4 3 5 - 5 3 mmol/L    Chloride 102 100 - 108 mmol/L    CO2 32 21 - 32 mmol/L    ANION GAP 0 (L) 4 - 13 mmol/L    BUN 10 5 - 25 mg/dL    Creatinine 0 67 0 60 - 1 30 mg/dL    Glucose 92 65 - 140 mg/dL    Calcium 8 8 8 3 - 10 1 mg/dL    eGFR 122 ml/min/1 73sq m       Radiology Results: I have personally reviewed pertinent reports  Other Diagnostic Testing:   I have personally reviewed pertinent reports          Active Meds:   Current Facility-Administered Medications   Medication Dose Route Frequency    aspirin chewable tablet 81 mg  81 mg Oral Daily    atorvastatin (LIPITOR) tablet 80 mg  80 mg Oral Daily With Dinner    ceFAZolin (ANCEF) IVPB (premix) 2,000 mg  2,000 mg Intravenous Q8H    guaiFENesin (MUCINEX) 12 hr tablet 600 mg  600 mg Oral Q12H PRN    lisinopril (ZESTRIL) tablet 2 5 mg  2 5 mg Oral Daily    metoprolol succinate (TOPROL-XL) 24 hr tablet 12 5 mg  12 5 mg Oral Daily    nicotine (NICODERM CQ) 7 mg/24hr TD 24 hr patch 7 mg  7 mg Transdermal Daily    rivaroxaban (XARELTO) tablet 15 mg  15 mg Oral BID With Meals         VTE Pharmacologic Prophylaxis: Reason for no pharmacologic prophylaxis DOAC  VTE Mechanical Prophylaxis: sequential compression device    Victorina Wilcox DO

## 2019-03-20 NOTE — PLAN OF CARE
Problem: PAIN - ADULT  Goal: Verbalizes/displays adequate comfort level or baseline comfort level  Description  Interventions:  - Encourage patient to monitor pain and request assistance  - Assess pain using appropriate pain scale  - Administer analgesics based on type and severity of pain and evaluate response  - Implement non-pharmacological measures as appropriate and evaluate response  - Consider cultural and social influences on pain and pain management  - Notify physician/advanced practitioner if interventions unsuccessful or patient reports new pain  Outcome: Progressing     Problem: INFECTION - ADULT  Goal: Absence or prevention of progression during hospitalization  Description  INTERVENTIONS:  - Assess and monitor for signs and symptoms of infection  - Monitor lab/diagnostic results  - Monitor all insertion sites, i e  indwelling lines, tubes, and drains  - Monitor endotracheal (as able) and nasal secretions for changes in amount and color  - [place] appropriate cooling/warming therapies per order  - Administer medications as ordered  - Instruct and encourage patient and family to use good hand hygiene technique  - Identify and instruct in appropriate isolation precautions for identified infection/condition  Outcome: Progressing  Goal: Absence of fever/infection during neutropenic period  Description  INTERVENTIONS:  - Monitor WBC  - Implement neutropenic guidelines  Outcome: Progressing     Problem: SAFETY ADULT  Goal: Patient will remain free of falls  Description  INTERVENTIONS:  - Assess patient frequently for physical needs  -  Identify cognitive and physical deficits and behaviors that affect risk of falls    -  [place] fall precautions as indicated by assessment   - Educate patient/family on patient safety including physical limitations  - Instruct patient to call for assistance with activity based on assessment  - Modify environment to reduce risk of injury  - Consider OT/PT consult to assist with strengthening/mobility  Outcome: Progressing  Goal: Maintain or return to baseline ADL function  Description  INTERVENTIONS:  -  Assess patient's ability to carry out ADLs; assess patient's baseline for ADL function and identify physical deficits which impact ability to perform ADLs (bathing, care of mouth/teeth, toileting, grooming, dressing, etc )  - Assess/evaluate cause of self-care deficits   - Assess range of motion  - Assess patient's mobility; develop plan if impaired  - Assess patient's need for assistive devices and provide as appropriate  - Encourage maximum independence but intervene and supervise when necessary  ¯ Involve family in performance of ADLs  ¯ Assess for home care needs following discharge   ¯ Request OT consult to assist with ADL evaluation and planning for discharge  ¯ Provide patient education as appropriate  Outcome: Progressing  Goal: Maintain or return mobility status to optimal level  Description  INTERVENTIONS:  - Assess patient's baseline mobility status (ambulation, transfers, stairs, etc )    - Identify cognitive and physical deficits and behaviors that affect mobility  - Identify mobility aids required to assist with transfers and/or ambulation (gait belt, sit-to-stand, lift, walker, cane, etc )  - [place] fall precautions as indicated by assessment  - Record patient progress and toleration of activity level on Mobility SBAR; progress patient to next Phase/Stage  - Instruct patient to call for assistance with activity based on assessment  - Request Rehabilitation consult to assist with strengthening/weightbearing, etc   Outcome: Progressing     Problem: DISCHARGE PLANNING  Goal: Discharge to home or other facility with appropriate resources  Description  INTERVENTIONS:  - Identify barriers to discharge w/patient and caregiver  - Arrange for needed discharge resources and transportation as appropriate  - Identify discharge learning needs (meds, wound care, etc )  - Arrange for interpretive services to assist at discharge as needed  - Refer to Case Management Department for coordinating discharge planning if the patient needs post-hospital services based on physician/advanced practitioner order or complex needs related to functional status, cognitive ability, or social support system  Outcome: Progressing     Problem: CARDIOVASCULAR - ADULT  Goal: Maintains optimal cardiac output and hemodynamic stability  Description  INTERVENTIONS:  - Monitor I/O, vital signs and rhythm  - Monitor for S/S and trends of decreased cardiac output i e  bleeding, hypotension  - Administer and titrate ordered vasoactive medications to optimize hemodynamic stability  - Assess quality of pulses, skin color and temperature  - Assess for signs of decreased coronary artery perfusion - ex   Angina  - Instruct patient to report change in severity of symptoms  Outcome: Progressing  Goal: Absence of cardiac dysrhythmias or at baseline rhythm  Description  INTERVENTIONS:  - Continuous cardiac monitoring, monitor vital signs, obtain 12 lead EKG if indicated  - Administer antiarrhythmic and heart rate control medications as ordered  - Monitor electrolytes and administer replacement therapy as ordered  Outcome: Progressing     Problem: METABOLIC, FLUID AND ELECTROLYTES - ADULT  Goal: Electrolytes maintained within normal limits  Description  INTERVENTIONS:  - Monitor labs and assess patient for signs and symptoms of electrolyte imbalances  - Administer electrolyte replacement as ordered  - Monitor response to electrolyte replacements, including repeat lab results as appropriate  - Instruct patient on fluid and nutrition as appropriate  Outcome: Progressing  Goal: Fluid balance maintained  Description  INTERVENTIONS:  - Monitor labs and assess for signs and symptoms of volume excess or deficit  - Monitor I/O and WT  - Instruct patient on fluid and nutrition as appropriate  Outcome: Progressing  Goal: Glucose maintained within target range  Description  INTERVENTIONS:  - Monitor Blood Glucose as ordered  - Assess for signs and symptoms of hyperglycemia and hypoglycemia  - Administer ordered medications to maintain glucose within target range  - Assess nutritional intake and initiate nutrition service referral as needed  Outcome: Progressing     Problem: SKIN/TISSUE INTEGRITY - ADULT  Goal: Skin integrity remains intact  Description  INTERVENTIONS  - Identify patients at risk for skin breakdown  - Assess and monitor skin integrity  - Assess and monitor nutrition and hydration status  - Monitor labs (i e  albumin)  - Assess for incontinence   - Turn and reposition patient  - Assist with mobility/ambulation  - Relieve pressure over bony prominences  - Avoid friction and shearing  - Provide appropriate hygiene as needed including keeping skin clean and dry  - Evaluate need for skin moisturizer/barrier cream  - Collaborate with interdisciplinary team (i e  Nutrition, Rehabilitation, etc )   - Patient/family teaching  Outcome: Progressing  Goal: Incision(s), wounds(s) or drain site(s) healing without S/S of infection  Description  INTERVENTIONS  - Assess and document risk factors for skin impairment   - Assess and document dressing, incision, wound bed, drain sites and surrounding tissue  - Initiate Nutrition services consult and/or wound management as needed  Outcome: Progressing  Goal: Oral mucous membranes remain intact  Description  INTERVENTIONS  - Assess oral mucosa and hygiene practices  - Implement preventative oral hygiene regimen  - Implement oral medicated treatments as ordered  - Initiate Nutrition services referral as needed  Outcome: Progressing     Problem: DISCHARGE PLANNING - CARE MANAGEMENT  Goal: Discharge to post-acute care or home with appropriate resources  Description  INTERVENTIONS:  - Conduct assessment to determine patient/family and health care team treatment goals, and need for post-acute services based on payer coverage, community resources, and patient preferences, and barriers to discharge  - Address psychosocial, clinical, and financial barriers to discharge as identified in assessment in conjunction with the patient/family and health care team  - Arrange appropriate level of post-acute services according to patient's   needs and preference and payer coverage in collaboration with the physician and health care team  - Communicate with and update the patient/family, physician, and health care team regarding progress on the discharge plan  - Arrange appropriate transportation to post-acute venues    Plan home to his mother's home in [facility], she will transport  Vinny Fleming for postop care  Outcome: Progressing     Problem: Potential for Falls  Goal: Patient will remain free of falls  Description  INTERVENTIONS:  - Assess patient frequently for physical needs  -  Identify cognitive and physical deficits and behaviors that affect risk of falls    -  [place] fall precautions as indicated by assessment   - Educate patient/family on patient safety including physical limitations  - Instruct patient to call for assistance with activity based on assessment  - Modify environment to reduce risk of injury  - Consider OT/PT consult to assist with strengthening/mobility  Outcome: Progressing     Problem: MUSCULOSKELETAL - ADULT  Goal: Maintain or return mobility to safest level of function  Description  INTERVENTIONS:  - Assess patient's ability to carry out ADLs; assess patient's baseline for ADL function and identify physical deficits which impact ability to perform ADLs (bathing, care of mouth/teeth, toileting, grooming, dressing, etc )  - Assess/evaluate cause of self-care deficits   - Assess range of motion  - Assess patient's mobility; develop plan if impaired  - Assess patient's need for assistive devices and provide as appropriate  - Encourage maximum independence but intervene and supervise when necessary  - Involve family in performance of ADLs  - Assess for home care needs following discharge   - Request OT consult to assist with ADL evaluation and planning for discharge  - Provide patient education as appropriate  Outcome: Progressing  Goal: Maintain proper alignment of affected body part  Description  INTERVENTIONS:  - Support, maintain and protect limb and body alignment  - Provide pt/fam with appropriate education  Outcome: Progressing

## 2019-03-21 PROCEDURE — 99231 SBSQ HOSP IP/OBS SF/LOW 25: CPT | Performed by: INTERNAL MEDICINE

## 2019-03-21 RX ADMIN — METOPROLOL SUCCINATE 12.5 MG: 25 TABLET, EXTENDED RELEASE ORAL at 08:18

## 2019-03-21 RX ADMIN — RIVAROXABAN 15 MG: 15 TABLET, FILM COATED ORAL at 08:19

## 2019-03-21 RX ADMIN — LISINOPRIL 2.5 MG: 2.5 TABLET ORAL at 08:19

## 2019-03-21 RX ADMIN — CEFAZOLIN SODIUM 2000 MG: 2 SOLUTION INTRAVENOUS at 08:30

## 2019-03-21 RX ADMIN — CEFAZOLIN SODIUM 2000 MG: 2 SOLUTION INTRAVENOUS at 00:45

## 2019-03-21 RX ADMIN — ATORVASTATIN CALCIUM 80 MG: 80 TABLET, FILM COATED ORAL at 17:58

## 2019-03-21 RX ADMIN — ASPIRIN 81 MG 81 MG: 81 TABLET ORAL at 08:19

## 2019-03-21 RX ADMIN — RIVAROXABAN 15 MG: 15 TABLET, FILM COATED ORAL at 17:58

## 2019-03-21 RX ADMIN — CEFAZOLIN SODIUM 2000 MG: 2 SOLUTION INTRAVENOUS at 17:58

## 2019-03-21 RX ADMIN — NICOTINE 7 MG: 7 PATCH TRANSDERMAL at 08:19

## 2019-03-21 NOTE — PROGRESS NOTES
IM Residency Progress Note   Unit/Bed#: [facility] 412-01 Encounter: [id number]  SOD Team B       Orlando Melvin 46 y o  male [id number]    Hospital Stay Days: 9      Assessment/Plan:    1  NSTEMI  · Metoprolol succinate 12 5 mg daily  · Atorvastatin 80 mg daily  · Aspirin 81 mg daily  · Lisinopril 2 5 mg daily    Will start Spironolactone 12 5 mg PO daily today if SBP>100 mmHg  · CT surgery recommending outpatient CABG after completion of IV antibiotics     2  Acute Subsegmental PE of LLL  · Xarelto 15 mg BID  · Continue to monitor clinically     3  MSSA Bacteremia   · Cefazolin 2000 mg IV q8 hours until 3/22/2019  · Monitor fever curves and WBC count    Will pull PICC line following last abx dose on 3/22/2019     4  Essential Hypertension  · Lisinopril 2 5 mg daily   · Continue cardiopulmonary monitoring     5  Hyperlipidemia  · Atorvastatin to 80 mg daily     6  Ischemic Cardiomyopathy with an EF of 25%  · Monitor for signs of volume overload  · Life Vest will be delivered by 3/22/2019    7  Hepatitis-C  · Hepatitis-C PCR positive genotype 1a  · Will need outpatient follow-up with GI      Disposition: Inpatient      Subjective:   Patient seen and examined at bedside  No acute events overnight  Denies chest pain, SOB, nausea, vomiting, diarrhea, fevers/chills  Life Vest to be delivered by 3/22/2019       Vitals: Temp (24hrs), Av °F (36 7 °C), Min:97 8 °F (36 6 °C), Max:98 2 °F (36 8 °C)  Current: Temperature: 97 8 °F (36 6 °C)  Vitals:    19 1954 19 2359 19 0327 19 0600   BP: 125/71 118/75 106/63    BP Location: Left arm Left arm Left arm    Pulse: 74 70 67    Resp: 18 18 20    Temp: 98 1 °F (36 7 °C) 97 8 °F (36 6 °C) 97 8 °F (36 6 °C)    TempSrc: Oral Oral Oral    SpO2: 96% 97% 96%    Weight:    97 kg (213 lb 13 5 oz)   Height:        Body mass index is 29 kg/m²  I/O last 24 hours:   In: 1160 [P O :1160]  Out: 2248 [Urine:1725]      Physical Exam   Constitutional: He is oriented to person, place, and time  He appears well-developed and well-nourished  No distress  HENT:   Head: Normocephalic and atraumatic  Nose: Nose normal    Mouth/Throat: No oropharyngeal exudate  Eyes: Conjunctivae are normal  Right eye exhibits no discharge  Left eye exhibits no discharge  No scleral icterus  Neck: Neck supple  No JVD present  Cardiovascular: Normal rate, regular rhythm, normal heart sounds and intact distal pulses  Exam reveals no gallop and no friction rub  No murmur heard  Pulmonary/Chest: Effort normal and breath sounds normal  No respiratory distress  He has no wheezes  He has no rales  Abdominal: Soft  Bowel sounds are normal  He exhibits no distension  There is no tenderness  There is no rebound and no guarding  Musculoskeletal: Normal range of motion  He exhibits no edema  Neurological: He is alert and oriented to person, place, and time  Skin: Skin is warm and dry  He is not diaphoretic  No erythema  Psychiatric: He has a normal mood and affect  Invasive Devices     Peripherally Inserted Central Catheter Line            PICC Line 28/57/87 Right Basilic 6 days                Labs:   No results found for this or any previous visit (from the past 24 hour(s))  Radiology Results: I have personally reviewed pertinent reports  Other Diagnostic Testing:   I have personally reviewed pertinent reports          Active Meds:   Current Facility-Administered Medications   Medication Dose Route Frequency    aspirin chewable tablet 81 mg  81 mg Oral Daily    atorvastatin (LIPITOR) tablet 80 mg  80 mg Oral Daily With Dinner    ceFAZolin (ANCEF) IVPB (premix) 2,000 mg  2,000 mg Intravenous Q8H    guaiFENesin (MUCINEX) 12 hr tablet 600 mg  600 mg Oral Q12H PRN    lisinopril (ZESTRIL) tablet 2 5 mg  2 5 mg Oral Daily    metoprolol succinate (TOPROL-XL) 24 hr tablet 12 5 mg  12 5 mg Oral Daily    nicotine (NICODERM CQ) 7 mg/24hr TD 24 hr patch 7 mg  7 mg Transdermal Daily  rivaroxaban (XARELTO) tablet 15 mg  15 mg Oral BID With Meals         VTE Pharmacologic Prophylaxis: Reason for no pharmacologic prophylaxis DOAC  VTE Mechanical Prophylaxis: sequential compression device    Pedro Luis Schwarz DO

## 2019-03-21 NOTE — PROGRESS NOTES
Heart Failure Service Progress Note - Attila Reece 46 y o  male MRN: [id number]    Unit/Bed#: [facility] 412-01 Encounter: [id number]      Assessment:    Principal Problem:    NSTEMI (non-ST elevated myocardial infarction) ([facility] )  Active Problems:    Ischemic cardiomyopathy (severe EF 25%)    Closed traumatic nondisplaced fracture of one rib of right side    Acute subsegmental PE of LLE    Tobacco abuse    Bacteremia due to Staphylococcus aureus (MSSA)    Hepatitis C    TSH elevation    Hyperlipidemia    Essential hypertension    CAD (coronary artery disease)    Polysubstance abuse ([facility] )    On continuous oral anticoagulation    Transaminitis      Subjective:   Patient seen and examined  No significant events overnight  Objective: Intake/ Output: 1160/1725  Weight: 213- stable  Tele:     # Newly diagnosed HFrEF, LVEF 25-30%, LVIDd 5 5 cm, NYHA II/III, ACC/AHA Stage C: Most likely driven by iCM given severe CAD as noted below  Possible component of toxin induced (+ meth on admission, but states this is first time)  Less likely stress and/or myocarditis  Will proceed with the following:  Diag:  --TTE 3/7/19: LVEF 25-30%, LVIDd 5 5 cm  Mildly reduced RVSF w/ mild RVE  LAE>GAIL  Trace TR  No significant valvular disease  --COLEMAN 3/11/19: No clear vegetations noted  --LHC: 90% prox LAD  100% Mid LAD  w/ R->L collaterals   80% prox RI  90% midRCA    --CT PE 3/6/19: Subsegmental LL PE    Neurohormonal Blockade:  --Beta-Blocker: metoprolol succinate 12 5 mg daily  --ACEi, ARB or ARNi: lisinopril 2 5 mg PO daily  --Aldosterone Receptor Blocker: Start spironolactone 12 5 mg PO daily tomorrow if SBP>100 mmHg  --Diuretic: Currently not requiring diuretics     Sudden Cardiac Death Risk Reduction:  --ICD: Given iCM, would favor LifeVest on D/C if patient is agreeable unless he has revascularization during this admission     Electrical Resynchronization:  --Candidacy for BiV device: Narrow QRS     Advanced Therapies: Will continue to monitor  Given recent heroin/meth abuse, not currently a candidate        # MSSA Bacteremia: Currently on Abx  No e/o endocarditis on COLEMAN  Cefazolin through 3/22 to complete 2 weeks of IV  # Severe 3v CAD: 90% prox LAD  100% Mid LAD  w/ R->L collaterals  80% prox RI  90% midRCA  # Injection drug abuse-patient previously denying he is currently using, however the current bacteremia is suspicious for ongoing drug use    # LLL subsegmental PE: Continue Xarelto for now; but can consider transition to Lovenox vs hep gtt given ongoing CT surgery workup  # HLD: atorvastatin 80 mg  # Hep C- PCR positive genotype 1a      Plan:  CT surgery as outpt or per CT surgery preferences  If leaving then LifeVest can be set up- - will talk with rep, it is approved    [employer] (day, reason): Jensen catheter (day, reason):    Vitals: Blood pressure 104/55, pulse 57, temperature 97 9 °F (36 6 °C), temperature source Oral, resp  rate 18, height 6' (1 829 m), weight 97 kg (213 lb 13 5 oz), SpO2 97 %  , Body mass index is 29 kg/m² , I/O last 3 completed shifts: In: 1160 [P O :1160]  Out: 2204 [Urine:2425]  I/O this shift:  In: 360 [P O :360]  Out: -   Wt Readings from Last 3 Encounters:   03/21/19 97 kg (213 lb 13 5 oz)   03/07/19 102 kg (224 lb)       Intake/Output Summary (Last 24 hours) at 3/21/2019 1019  Last data filed at 3/21/2019 0814  Gross per 24 hour   Intake 1520 ml   Output 1725 ml   Net -205 ml     I/O last 3 completed shifts: In: 1160 [P O :1160]  Out: 0271 [Urine:2425]    No significant arrhythmias seen on telemetry review         Physical Exam:  Vitals:    03/20/19 2359 03/21/19 0327 03/21/19 0600 03/21/19 0748   BP: 118/75 106/63  104/55   BP Location: Left arm Left arm  Left arm   Pulse: 70 67  57   Resp: 18 20  18   Temp: 97 8 °F (36 6 °C) 97 8 °F (36 6 °C)  97 9 °F (36 6 °C)   TempSrc: Oral Oral  Oral   SpO2: 97% 96%  97%   Weight:   97 kg (213 lb 13 5 oz)    Height:           GEN: Unruly Sanchez appears well, alert and oriented x 3, pleasant and cooperative   HEENT: pupils equal, round, and reactive to light; extraocular muscles intact  NECK: supple, no carotid bruits   HEART: regular rhythm, normal S1 and S2, no murmurs, clicks, gallops or rubs, JVP is    LUNGS: clear to auscultation bilaterally; no wheezes, rales, or rhonchi   ABDOMEN: normal bowel sounds, soft, no tenderness, no distention  EXTREMITIES: peripheral pulses normal; no clubbing, cyanosis, or edema  NEURO: no focal findings   SKIN: normal without suspicious lesions on exposed skin      Current Facility-Administered Medications:     aspirin chewable tablet 81 mg, 81 mg, Oral, Daily, Bryan Saunders MD, 81 mg at 03/21/19 0819    atorvastatin (LIPITOR) tablet 80 mg, 80 mg, Oral, Daily With Saman Christopher MD, 80 mg at 03/20/19 1613    ceFAZolin (ANCEF) IVPB (premix) 2,000 mg, 2,000 mg, Intravenous, Q8H, Manjeet Boyd MD, Last Rate: 100 mL/hr at 03/21/19 0830, 2,000 mg at 03/21/19 0830    guaiFENesin (MUCINEX) 12 hr tablet 600 mg, 600 mg, Oral, Q12H PRN, Rogelio Benitez MD    lisinopril (ZESTRIL) tablet 2 5 mg, 2 5 mg, Oral, Daily, Rachell Garcia MD, 2 5 mg at 03/21/19 0819    metoprolol succinate (TOPROL-XL) 24 hr tablet 12 5 mg, 12 5 mg, Oral, Daily, Rachell Garcia MD, 12 5 mg at 03/21/19 0818    nicotine (NICODERM CQ) 7 mg/24hr TD 24 hr patch 7 mg, 7 mg, Transdermal, Daily, Bryan Saunders MD, 7 mg at 03/21/19 0538    rivaroxaban (XARELTO) tablet 15 mg, 15 mg, Oral, BID With Meals, Bryan Saunders MD, 15 mg at 03/21/19 0819      Labs & Results:        Results from last 7 days   Lab Units 03/20/19  0448 03/18/19  0520 03/16/19  0429   WBC Thousand/uL 7 11 7 03 7 61   HEMOGLOBIN g/dL 14 0 13 1 14 7   HEMATOCRIT % 42 5 39 3 44 4   PLATELETS Thousands/uL 202 195 241         Results from last 7 days   Lab Units 03/20/19  0448 03/18/19  0520 03/16/19  0429   POTASSIUM mmol/L 4 4 4 0 3 9   CHLORIDE mmol/L 102 101 107   CO2 mmol/L 32 28 25   BUN mg/dL 10 10 11   CREATININE mg/dL 0 67 0 69 0 64   CALCIUM mg/dL 8 8 8 7 8 6           Counseling / Coordination of Care  Total floor / unit time spent today 25 minutes  Greater than 50% of total time was spent with the patient and / or family counseling and / or coordination of care  A description of the counseling / coordination of care: 15  Thank you for the opportunity to participate in the care of this patient  Shankar LU    Director Heart Failure/ Medical Director [address] [place] [address]

## 2019-03-21 NOTE — PLAN OF CARE
Problem: PAIN - ADULT  Goal: Verbalizes/displays adequate comfort level or baseline comfort level  Description  Interventions:  - Encourage patient to monitor pain and request assistance  - Assess pain using appropriate pain scale  - Administer analgesics based on type and severity of pain and evaluate response  - Implement non-pharmacological measures as appropriate and evaluate response  - Consider cultural and social influences on pain and pain management  - Notify physician/advanced practitioner if interventions unsuccessful or patient reports new pain  Outcome: Progressing     Problem: INFECTION - ADULT  Goal: Absence or prevention of progression during hospitalization  Description  INTERVENTIONS:  - Assess and monitor for signs and symptoms of infection  - Monitor lab/diagnostic results  - Monitor all insertion sites, i e  indwelling lines, tubes, and drains  - Monitor endotracheal (as able) and nasal secretions for changes in amount and color  - [place] appropriate cooling/warming therapies per order  - Administer medications as ordered  - Instruct and encourage patient and family to use good hand hygiene technique  - Identify and instruct in appropriate isolation precautions for identified infection/condition  Outcome: Progressing  Goal: Absence of fever/infection during neutropenic period  Description  INTERVENTIONS:  - Monitor WBC  - Implement neutropenic guidelines  Outcome: Progressing     Problem: SAFETY ADULT  Goal: Patient will remain free of falls  Description  INTERVENTIONS:  - Assess patient frequently for physical needs  -  Identify cognitive and physical deficits and behaviors that affect risk of falls    -  [place] fall precautions as indicated by assessment   - Educate patient/family on patient safety including physical limitations  - Instruct patient to call for assistance with activity based on assessment  - Modify environment to reduce risk of injury  - Consider OT/PT consult to assist with strengthening/mobility  Outcome: Progressing  Goal: Maintain or return to baseline ADL function  Description  INTERVENTIONS:  -  Assess patient's ability to carry out ADLs; assess patient's baseline for ADL function and identify physical deficits which impact ability to perform ADLs (bathing, care of mouth/teeth, toileting, grooming, dressing, etc )  - Assess/evaluate cause of self-care deficits   - Assess range of motion  - Assess patient's mobility; develop plan if impaired  - Assess patient's need for assistive devices and provide as appropriate  - Encourage maximum independence but intervene and supervise when necessary  ¯ Involve family in performance of ADLs  ¯ Assess for home care needs following discharge   ¯ Request OT consult to assist with ADL evaluation and planning for discharge  ¯ Provide patient education as appropriate  Outcome: Progressing  Goal: Maintain or return mobility status to optimal level  Description  INTERVENTIONS:  - Assess patient's baseline mobility status (ambulation, transfers, stairs, etc )    - Identify cognitive and physical deficits and behaviors that affect mobility  - Identify mobility aids required to assist with transfers and/or ambulation (gait belt, sit-to-stand, lift, walker, cane, etc )  - [place] fall precautions as indicated by assessment  - Record patient progress and toleration of activity level on Mobility SBAR; progress patient to next Phase/Stage  - Instruct patient to call for assistance with activity based on assessment  - Request Rehabilitation consult to assist with strengthening/weightbearing, etc   Outcome: Progressing     Problem: DISCHARGE PLANNING  Goal: Discharge to home or other facility with appropriate resources  Description  INTERVENTIONS:  - Identify barriers to discharge w/patient and caregiver  - Arrange for needed discharge resources and transportation as appropriate  - Identify discharge learning needs (meds, wound care, etc )  - Arrange for interpretive services to assist at discharge as needed  - Refer to Case Management Department for coordinating discharge planning if the patient needs post-hospital services based on physician/advanced practitioner order or complex needs related to functional status, cognitive ability, or social support system  Outcome: Progressing     Problem: CARDIOVASCULAR - ADULT  Goal: Maintains optimal cardiac output and hemodynamic stability  Description  INTERVENTIONS:  - Monitor I/O, vital signs and rhythm  - Monitor for S/S and trends of decreased cardiac output i e  bleeding, hypotension  - Administer and titrate ordered vasoactive medications to optimize hemodynamic stability  - Assess quality of pulses, skin color and temperature  - Assess for signs of decreased coronary artery perfusion - ex   Angina  - Instruct patient to report change in severity of symptoms  Outcome: Progressing  Goal: Absence of cardiac dysrhythmias or at baseline rhythm  Description  INTERVENTIONS:  - Continuous cardiac monitoring, monitor vital signs, obtain 12 lead EKG if indicated  - Administer antiarrhythmic and heart rate control medications as ordered  - Monitor electrolytes and administer replacement therapy as ordered  Outcome: Progressing     Problem: METABOLIC, FLUID AND ELECTROLYTES - ADULT  Goal: Electrolytes maintained within normal limits  Description  INTERVENTIONS:  - Monitor labs and assess patient for signs and symptoms of electrolyte imbalances  - Administer electrolyte replacement as ordered  - Monitor response to electrolyte replacements, including repeat lab results as appropriate  - Instruct patient on fluid and nutrition as appropriate  Outcome: Progressing  Goal: Fluid balance maintained  Description  INTERVENTIONS:  - Monitor labs and assess for signs and symptoms of volume excess or deficit  - Monitor I/O and WT  - Instruct patient on fluid and nutrition as appropriate  Outcome: Progressing  Goal: Glucose maintained within target range  Description  INTERVENTIONS:  - Monitor Blood Glucose as ordered  - Assess for signs and symptoms of hyperglycemia and hypoglycemia  - Administer ordered medications to maintain glucose within target range  - Assess nutritional intake and initiate nutrition service referral as needed  Outcome: Progressing     Problem: SKIN/TISSUE INTEGRITY - ADULT  Goal: Skin integrity remains intact  Description  INTERVENTIONS  - Identify patients at risk for skin breakdown  - Assess and monitor skin integrity  - Assess and monitor nutrition and hydration status  - Monitor labs (i e  albumin)  - Assess for incontinence   - Turn and reposition patient  - Assist with mobility/ambulation  - Relieve pressure over bony prominences  - Avoid friction and shearing  - Provide appropriate hygiene as needed including keeping skin clean and dry  - Evaluate need for skin moisturizer/barrier cream  - Collaborate with interdisciplinary team (i e  Nutrition, Rehabilitation, etc )   - Patient/family teaching  Outcome: Progressing  Goal: Incision(s), wounds(s) or drain site(s) healing without S/S of infection  Description  INTERVENTIONS  - Assess and document risk factors for skin impairment   - Assess and document dressing, incision, wound bed, drain sites and surrounding tissue  - Initiate Nutrition services consult and/or wound management as needed  Outcome: Progressing  Goal: Oral mucous membranes remain intact  Description  INTERVENTIONS  - Assess oral mucosa and hygiene practices  - Implement preventative oral hygiene regimen  - Implement oral medicated treatments as ordered  - Initiate Nutrition services referral as needed  Outcome: Progressing     Problem: DISCHARGE PLANNING - CARE MANAGEMENT  Goal: Discharge to post-acute care or home with appropriate resources  Description  INTERVENTIONS:  - Conduct assessment to determine patient/family and health care team treatment goals, and need for post-acute services based on payer coverage, community resources, and patient preferences, and barriers to discharge  - Address psychosocial, clinical, and financial barriers to discharge as identified in assessment in conjunction with the patient/family and health care team  - Arrange appropriate level of post-acute services according to patient's   needs and preference and payer coverage in collaboration with the physician and health care team  - Communicate with and update the patient/family, physician, and health care team regarding progress on the discharge plan  - Arrange appropriate transportation to post-acute venues    Plan home to his mother's home in [facility], she will transport  Vinny Fleming for postop care  Outcome: Progressing     Problem: Potential for Falls  Goal: Patient will remain free of falls  Description  INTERVENTIONS:  - Assess patient frequently for physical needs  -  Identify cognitive and physical deficits and behaviors that affect risk of falls    -  [place] fall precautions as indicated by assessment   - Educate patient/family on patient safety including physical limitations  - Instruct patient to call for assistance with activity based on assessment  - Modify environment to reduce risk of injury  - Consider OT/PT consult to assist with strengthening/mobility  Outcome: Progressing     Problem: MUSCULOSKELETAL - ADULT  Goal: Maintain or return mobility to safest level of function  Description  INTERVENTIONS:  - Assess patient's ability to carry out ADLs; assess patient's baseline for ADL function and identify physical deficits which impact ability to perform ADLs (bathing, care of mouth/teeth, toileting, grooming, dressing, etc )  - Assess/evaluate cause of self-care deficits   - Assess range of motion  - Assess patient's mobility; develop plan if impaired  - Assess patient's need for assistive devices and provide as appropriate  - Encourage maximum independence but intervene and supervise when necessary  - Involve family in performance of ADLs  - Assess for home care needs following discharge   - Request OT consult to assist with ADL evaluation and planning for discharge  - Provide patient education as appropriate  Outcome: Progressing  Goal: Maintain proper alignment of affected body part  Description  INTERVENTIONS:  - Support, maintain and protect limb and body alignment  - Provide pt/fam with appropriate education  Outcome: Progressing

## 2019-03-22 VITALS
SYSTOLIC BLOOD PRESSURE: 111 MMHG | WEIGHT: 216.71 LBS | HEIGHT: 72 IN | OXYGEN SATURATION: 96 % | BODY MASS INDEX: 29.35 KG/M2 | RESPIRATION RATE: 18 BRPM | HEART RATE: 75 BPM | DIASTOLIC BLOOD PRESSURE: 60 MMHG | TEMPERATURE: 97.8 F

## 2019-03-22 LAB
ANION GAP SERPL CALCULATED.3IONS-SCNC: 5 MMOL/L (ref 4–13)
BASOPHILS # BLD AUTO: 0.1 THOUSANDS/ΜL (ref 0–0.1)
BASOPHILS NFR BLD AUTO: 1 % (ref 0–1)
BUN SERPL-MCNC: 16 MG/DL (ref 5–25)
CALCIUM SERPL-MCNC: 9.2 MG/DL (ref 8.3–10.1)
CHLORIDE SERPL-SCNC: 105 MMOL/L (ref 100–108)
CO2 SERPL-SCNC: 26 MMOL/L (ref 21–32)
CREAT SERPL-MCNC: 0.72 MG/DL (ref 0.6–1.3)
EOSINOPHIL # BLD AUTO: 0.32 THOUSAND/ΜL (ref 0–0.61)
EOSINOPHIL NFR BLD AUTO: 5 % (ref 0–6)
ERYTHROCYTE [DISTWIDTH] IN BLOOD BY AUTOMATED COUNT: 13.3 % (ref 11.6–15.1)
GFR SERPL CREATININE-BSD FRML MDRD: 118 ML/MIN/1.73SQ M
GLUCOSE SERPL-MCNC: 94 MG/DL (ref 65–140)
HCT VFR BLD AUTO: 43.4 % (ref 36.5–49.3)
HGB BLD-MCNC: 14.2 G/DL (ref 12–17)
IMM GRANULOCYTES # BLD AUTO: 0.02 THOUSAND/UL (ref 0–0.2)
IMM GRANULOCYTES NFR BLD AUTO: 0 % (ref 0–2)
LYMPHOCYTES # BLD AUTO: 2.45 THOUSANDS/ΜL (ref 0.6–4.47)
LYMPHOCYTES NFR BLD AUTO: 35 % (ref 14–44)
MAGNESIUM SERPL-MCNC: 2.2 MG/DL (ref 1.6–2.6)
MCH RBC QN AUTO: 29.9 PG (ref 26.8–34.3)
MCHC RBC AUTO-ENTMCNC: 32.7 G/DL (ref 31.4–37.4)
MCV RBC AUTO: 91 FL (ref 82–98)
MONOCYTES # BLD AUTO: 0.64 THOUSAND/ΜL (ref 0.17–1.22)
MONOCYTES NFR BLD AUTO: 9 % (ref 4–12)
NEUTROPHILS # BLD AUTO: 3.47 THOUSANDS/ΜL (ref 1.85–7.62)
NEUTS SEG NFR BLD AUTO: 50 % (ref 43–75)
NRBC BLD AUTO-RTO: 0 /100 WBCS
PHOSPHATE SERPL-MCNC: 4.2 MG/DL (ref 2.7–4.5)
PLATELET # BLD AUTO: 230 THOUSANDS/UL (ref 149–390)
PMV BLD AUTO: 13.1 FL (ref 8.9–12.7)
POTASSIUM SERPL-SCNC: 4.1 MMOL/L (ref 3.5–5.3)
RBC # BLD AUTO: 4.75 MILLION/UL (ref 3.88–5.62)
SODIUM SERPL-SCNC: 136 MMOL/L (ref 136–145)
WBC # BLD AUTO: 7 THOUSAND/UL (ref 4.31–10.16)

## 2019-03-22 PROCEDURE — 85025 COMPLETE CBC W/AUTO DIFF WBC: CPT | Performed by: INTERNAL MEDICINE

## 2019-03-22 PROCEDURE — 83735 ASSAY OF MAGNESIUM: CPT | Performed by: INTERNAL MEDICINE

## 2019-03-22 PROCEDURE — 84100 ASSAY OF PHOSPHORUS: CPT | Performed by: INTERNAL MEDICINE

## 2019-03-22 PROCEDURE — 99232 SBSQ HOSP IP/OBS MODERATE 35: CPT | Performed by: INTERNAL MEDICINE

## 2019-03-22 PROCEDURE — 80048 BASIC METABOLIC PNL TOTAL CA: CPT | Performed by: INTERNAL MEDICINE

## 2019-03-22 PROCEDURE — 99231 SBSQ HOSP IP/OBS SF/LOW 25: CPT | Performed by: INTERNAL MEDICINE

## 2019-03-22 RX ORDER — LISINOPRIL 2.5 MG/1
2.5 TABLET ORAL DAILY
Qty: 30 TABLET | Refills: 0 | Status: SHIPPED | OUTPATIENT
Start: 2019-03-23 | End: 2019-04-11 | Stop reason: SDUPTHER

## 2019-03-22 RX ORDER — METOPROLOL SUCCINATE 25 MG/1
12.5 TABLET, EXTENDED RELEASE ORAL DAILY
Qty: 60 TABLET | Refills: 2 | Status: SHIPPED | OUTPATIENT
Start: 2019-03-23 | End: 2019-03-22

## 2019-03-22 RX ORDER — METOPROLOL SUCCINATE 25 MG/1
12.5 TABLET, EXTENDED RELEASE ORAL DAILY
Qty: 30 TABLET | Refills: 0 | Status: SHIPPED | OUTPATIENT
Start: 2019-03-23 | End: 2019-04-11 | Stop reason: SDUPTHER

## 2019-03-22 RX ORDER — ASPIRIN 81 MG/1
81 TABLET, CHEWABLE ORAL DAILY
Qty: 60 TABLET | Refills: 2 | Status: SHIPPED | OUTPATIENT
Start: 2019-03-23 | End: 2019-03-22

## 2019-03-22 RX ORDER — ATORVASTATIN CALCIUM 80 MG/1
80 TABLET, FILM COATED ORAL
Qty: 60 TABLET | Refills: 2 | Status: SHIPPED | OUTPATIENT
Start: 2019-03-22 | End: 2019-03-22

## 2019-03-22 RX ORDER — CEFAZOLIN SODIUM 2 G/50ML
2000 SOLUTION INTRAVENOUS ONCE
Status: COMPLETED | OUTPATIENT
Start: 2019-03-22 | End: 2019-03-22

## 2019-03-22 RX ORDER — LISINOPRIL 2.5 MG/1
2.5 TABLET ORAL DAILY
Qty: 60 TABLET | Refills: 2 | Status: SHIPPED | OUTPATIENT
Start: 2019-03-23 | End: 2019-03-22

## 2019-03-22 RX ORDER — CEFAZOLIN SODIUM 2 G/50ML
2000 SOLUTION INTRAVENOUS EVERY 8 HOURS
Status: DISCONTINUED | OUTPATIENT
Start: 2019-03-22 | End: 2019-03-22

## 2019-03-22 RX ORDER — ASPIRIN 81 MG/1
81 TABLET, CHEWABLE ORAL DAILY
Qty: 30 TABLET | Refills: 0 | Status: SHIPPED | OUTPATIENT
Start: 2019-03-23

## 2019-03-22 RX ORDER — ATORVASTATIN CALCIUM 80 MG/1
80 TABLET, FILM COATED ORAL
Qty: 30 TABLET | Refills: 0 | Status: SHIPPED | OUTPATIENT
Start: 2019-03-22 | End: 2019-04-11 | Stop reason: SDUPTHER

## 2019-03-22 RX ADMIN — LISINOPRIL 2.5 MG: 2.5 TABLET ORAL at 09:43

## 2019-03-22 RX ADMIN — RIVAROXABAN 15 MG: 15 TABLET, FILM COATED ORAL at 09:42

## 2019-03-22 RX ADMIN — METOPROLOL SUCCINATE 12.5 MG: 25 TABLET, EXTENDED RELEASE ORAL at 09:43

## 2019-03-22 RX ADMIN — CEFAZOLIN SODIUM 2000 MG: 2 SOLUTION INTRAVENOUS at 09:55

## 2019-03-22 RX ADMIN — CEFAZOLIN SODIUM 2000 MG: 2 SOLUTION INTRAVENOUS at 14:33

## 2019-03-22 RX ADMIN — NICOTINE 7 MG: 7 PATCH TRANSDERMAL at 09:42

## 2019-03-22 RX ADMIN — ASPIRIN 81 MG 81 MG: 81 TABLET ORAL at 09:42

## 2019-03-22 RX ADMIN — CEFAZOLIN SODIUM 2000 MG: 2 SOLUTION INTRAVENOUS at 00:36

## 2019-03-22 NOTE — DISCHARGE SUMMARY
[facility] Discharge Summary - Medical Scarlett Evelyne 46 y o  male MRN: [id number]    [address] [facility] [address] PPHP 4 MED SURG/SD Room / Bed: [address] 412/PPHP 967-81 Encounter: [id number]    BRIEF OVERVIEW    Admitting Provider: Andrade Washburn MD  Discharge Provider: Andrade Wsahburn MD  Primary Care Physician at Discharge: Dr Karyn Elizondo    Discharge To: Home  Facility / Family Member Name: Home  Phone Number: Home    Admission Date: 3/12/2019     Discharge Date: 3/22/2019    Primary Discharge Diagnosis  Principal Problem:    NSTEMI (non-ST elevated myocardial infarction) [facility])  Active Problems:    Closed traumatic nondisplaced fracture of one rib of right side    Acute subsegmental PE of LLE    Tobacco abuse    Bacteremia due to Staphylococcus aureus (MSSA)    Hepatitis C    TSH elevation    Hyperlipidemia    Essential hypertension    Ischemic cardiomyopathy (severe EF 25%)    CAD (coronary artery disease)    Polysubstance abuse ([address] )    On continuous oral anticoagulation    Transaminitis  Resolved Problems:    * No resolved hospital problems  *      Consulting Providers   Infectious Diseases  CT Surgery  Heart Failure    Diagnostic Procedures Performed  Xr Chest Portable    Result Date: 3/17/2019  Impression: No acute cardiopulmonary disease  Workstation performed: [id number]     Discharge Disposition: [address]  Discharged With Lines: no    Test Results Pending at Discharge: Blood Cultures     Outpatient Follow-Up  CT Surgery - CABG  Follow up with consulting providers  CT Surgery - CABG  Active Issues Requiring Follow-up   Triple-Vessel CAD  Hepatitis-C  IVDA    Code Status: Level 1 - Full Code  Advance Directive and Living Will: <no information>  Power of :    POLST:      Medications   See after visit summary for reconciled discharge medications provided to patient and family      Allergies  No Known Allergies  Discharge Diet: cardiac diet  Activity restrictions: none    [address] is a 46 y o  male with a past medical history significant for IV drug abuse who presented to [facility] on March 6th with altered mental status and sepsis  Initial differential of the patient's altered mental status at that time include seizure versus encephalopathy due to drugs  The patient was found to have an elevated troponin level which peaked at 4 72  Further evaluation revealed that the patient had a left lower lobe subsegmental pulmonary embolism for which she was started on a heparin drip  Also there was concern for aspiration pneumonia however Infectious Disease decided to not treat the patient at this time  However on March 6th blood cultures became positive for MSSA  Repeat blood cultures on March 8th were negative  The patient was started on IV cefazolin and will be treated until March 22nd  The patient's heparin drip for the pulmonary embolism was discontinued and he was transitioned to Xarelto  Further evaluation included an echocardiogram which revealed diffuse hypokinesis with ejection fraction of 25-30%  Note the patient has a family history significant for familial hypercholesterolemia  His father had a myocardial infarction at the age of 27 and his uncle had a myocardial infarction at the age of 34  A COLEMAN was performed which was negative for vegetation  On March 12, the patient underwent a cardiac catheterization which revealed a 90% occlusion of the proximal LAD, a 100% occlusion of the mid LAD, and a 90% occlusion of the mid RCA  The patient was [facility] on March 13th for a CT surgery evaluation with Dr Yusra Gary       Upon speaking with the patient at the bedside, the patient has no complaints at this time  He denies chest pain, shortness of breath, nausea, vomiting, diarrhea, fevers/chills  All vital signs are stable      The patient was admitted to the general medicine service and was treated as follows:    1  NSTEMI  · Metoprolol succinate 12 5 mg daily  · Atorvastatin 80 mg daily  · Aspirin 81 mg daily  · Lisinopril 2 5 mg daily    Will start Spironolactone 12 5 mg PO daily today if SBP>100 mmHg  · CT surgery recommending outpatient CABG after completion of IV antibiotics     2  Acute Subsegmental PE of LLL  · Xarelto 15 mg BID  · Continue to monitor clinically     3  MSSA Bacteremia   · Cefazolin 2000 mg IV q8 hours until 3/22/2019  · Monitor fever curves and WBC count    Will pull PICC line following last abx dose on 3/22/2019     4  Essential Hypertension  · Lisinopril 2 5 mg daily   · Continue cardiopulmonary monitoring     5  Hyperlipidemia  · Atorvastatin to 80 mg daily     6  Ischemic Cardiomyopathy with an EF of 25%  · Monitor for signs of volume overload  · Life Vest fitted and delivered to patient     7  Hepatitis-C  · Hepatitis-C PCR positive genotype 1a  · Will need outpatient follow-up with GI    Presenting Problem/History of Present Illness  Principal Problem:    NSTEMI (non-ST elevated myocardial infarction) ([facility] [address] )  Active Problems:    Closed traumatic nondisplaced fracture of one rib of right side    Acute subsegmental PE of LLE    Tobacco abuse    Bacteremia due to Staphylococcus aureus (MSSA)    Hepatitis C    TSH elevation    Hyperlipidemia    Essential hypertension    Ischemic cardiomyopathy (severe EF 25%)    CAD (coronary artery disease)    Polysubstance abuse ([facility] [address] )    On continuous oral anticoagulation    Transaminitis  Resolved Problems:    * No resolved hospital problems  *      Discharge Condition: stable    Discharge  Statement   I spent 45 minutes minutes discharging the patient  This time was spent on the day of discharge  I had direct contact with the patient on the day of discharge  Additional documentation is required if more than 30 minutes were spent on discharge

## 2019-03-22 NOTE — PROGRESS NOTES
Progress Note - Infectious Disease   Ismael Crandall 46 y o  male MRN: [id number]  Unit/Bed#: [facility] 412-01 Encounter: [id number]      Impression/Plan:  1  Sepsis-POA   Leukocytosis and tachycardia on admission at [facility] to [address] [place] [address] [address] that this is related to patient's drug use   The patient remains hemodynamically stable and nontoxic despite his initial systemic illness   Patient currently tolerating antibiotics  -patient to complete cefazolin today  -continue to trend fever curve/vitals     2  MSSA bacteremia:  Suspect this is related to the patient's polysubstance abuse along with multiple scabs on his skin   Although only 1 set is positive, consider to be clinically relevant given his acute status on presentation   Consideration for the possibility of endocarditis   Transthoracic and transesophageal echocardiograms are both negative for valvular vegetations   As this was a low-grade, transient bacteremia at worst, very unlikely that this represents an endovascular infection  -patient to complete antibiotics today  -pull PICC line after last dose of antibiotic today  -office staff aware and will follow up on surveillance blood cultures  -patient will not require formal follow-up in the ID office  -patient is cleared from an ID standpoint for discharge after last dose of antibiotic today     3  Injection drug abuse-patient previously denying he is currently using, however the current bacteremia is suspicious for ongoing drug use   Patient's U tox was noted to be positive on admission and he admitted today to snorting heroin    Complicated by hepatitis-C   HIV screen negative   -the patient is not a candidate for home intravenous antibiotics      4  Hepatitis C-in the setting of injection drug use   Liver function tests remain abnormal   Hepatitis C RNA testing positive   -monitor liver function test  -patient may benefit from hep C therapy along with drug rehab     5   Abnormal CT of the chest-the ground-glass changes are not really consistent with an acute bacterial pneumonia   Possibly a transient aspiration pneumonitis   Possibly this is a toxin event to the lungs in the setting of active drug use  The patient's respiratory status has improved   Repeat chest x-ray without acute findings  -no need for antibiotics for this issue  -monitor respiratory status     6  Pulmonary embolism-anticoagulation as per the primary service     7  Cardiomyopathy-unclear etiology   Potentially related to patient's drug use   Cardiac catheterization with significant disease  -CT surgery evaluation noted  -patient planned for eventual CABG as outpatient      Above plan discussed in detail with the patient       ID consult service will sign off at this time  Please call if any additional questions or concerns  Antibiotics:  Ancef    24 hour events:  No acute events noted overnight on chart review  Patient is currently afebrile  White count 7  No new culture data  Patient's other vitals are stable    Subjective:  Patient seen at bedside this morning and currently denies having any nausea, vomiting, chest pain or shortness of breath  He is currently tolerating antibiotic without issue and denies having any issues at his PICC site  Objective:  Vitals:  Temp:  [97 5 °F (36 4 °C)-98 8 °F (37 1 °C)] 97 7 °F (36 5 °C)  HR:  [68-82] 68  Resp:  [16-18] 18  BP: ()/(46-69) 104/57  SpO2:  [94 %-97 %] 94 %  Temp (24hrs), Av 1 °F (36 7 °C), Min:97 5 °F (36 4 °C), Max:98 8 °F (37 1 °C)  Current: Temperature: 97 7 °F (36 5 °C)    Physical Exam:   General Appearance:  Alert, interactive, nontoxic, no acute distress  Throat: Oropharynx moist without lesions  Poor dentition noted   Lungs:   Clear to auscultation bilaterally; no wheezes, rhonchi or rales; respirations unlabored   Heart:  RRR; no  rub or gallop; systolic murmur present   Abdomen:   Soft, non-tender, non-distended, positive bowel sounds  Extremities: No clubbing, cyanosis or edema   Skin: No new rashes or lesions  No new draining wounds noted         Labs, Imaging, & Other studies:   All pertinent labs and imaging studies were personally reviewed  Results from last 7 days   Lab Units 03/22/19  0547 03/20/19  0448 03/18/19  0520   WBC Thousand/uL 7 00 7 11 7 03   HEMOGLOBIN g/dL 14 2 14 0 13 1   PLATELETS Thousands/uL 230 202 195     Results from last 7 days   Lab Units 03/22/19  0547   POTASSIUM mmol/L 4 1   CHLORIDE mmol/L 105   CO2 mmol/L 26   BUN mg/dL 16   CREATININE mg/dL 0 72   EGFR ml/min/1 73sq m 118   CALCIUM mg/dL 9 2

## 2019-03-22 NOTE — DISCHARGE INSTRUCTIONS
Before Coronary Artery Bypass Graft   AMBULATORY CARE:   Coronary artery bypass graft (CABG) is open heart surgery to clear blocked arteries in your heart  CABG surgery improves blood flow to your heart by bypassing (sending blood around) the blocked part of an artery  This restores blood flow to your heart and helps prevent a heart attack         Contact your healthcare provider if:   · You have a fever or symptoms of the flu the day before, or the day of, your surgery      · You have questions or concerns about your condition or care  Tests may be needed several days before or on the day of surgery  You may need blood tests, an electrocardiogram (EKG), or an echocardiogram  You may also need a cardiac catheterization or a coronary angiogram  These tests will help your healthcare provider see where the blood flow is blocked and help him plan your surgery  Eating and drinking before surgery: Your healthcare provider may tell you not to eat or drink anything after midnight on the day of your surgery  Medicines before surgery: Your healthcare provider will tell you what medicines to take or not take on the day of your surgery  Talk to your healthcare provider several days before your surgery about any medicines that you take regularly:  · You may need to stop taking blood thinner, aspirin, or NSAID medicine several days before the surgery  This may prevent bleeding during and after your surgery       · You may need to stop taking certain vitamins or herbal supplements several days before the surgery  Some vitamins and herbal supplements may increase your risk for bleeding and other complications       · If you have diabetes, ask about your insulin  On the morning of your surgery, you may need to skip your dose or take a smaller dose  This will prevent your blood sugar level from going too low   Do not take your oral diabetic medicine on the morning of your surgery       · Ask your healthcare provider if you should take your blood pressure or heart medicine before your surgery  Do not stop your medicine without talking to your healthcare provider       · Take any medicine you were told to take with a sip of water on the morning of your surgery  Blood donation before surgery: You may be able to donate your own blood before surgery  This is called autologous blood donation  You may also ask a family member or friend with the same blood type to donate blood for you  This is called directed blood donation  Weight loss before surgery: You may need to lose weight before your surgery  This may decrease the stress on your heart and your risk for complications during or after surgery  Talk to your healthcare provider if you need help to lose weight  Quit smoking before surgery: Nicotine and other chemicals in cigarettes and cigars can damage your heart and lungs  Smoking may prevent healing and increase your risk for complications during or after your surgery  Ask your healthcare provider for information if you currently smoke and need help to quit  E-cigarettes or smokeless tobacco still contain nicotine  Talk to your healthcare provider before you use these products  What to expect after surgery:   · You may spend 1 to 3 days in an intensive care unit (ICU)  Healthcare providers will closely monitor your breathing, blood pressure, oxygen levels, and heart rate  They will also watch for any complications  You may need several medicines to control your blood pressure and heart rate, and to prevent blood clots  You may also need a blood transfusion  After you leave the ICU, you may need to spend 3 to 5 days in the hospital before you can go home       · You may wake up from surgery with an endotracheal tube (ET tube)  An ET tube is inserted through your mouth and into your lungs  It is attached to a ventilator  A ventilator is a machine that gives you oxygen and breathes for you when you cannot breathe well on your own  Healthcare providers will remove the ET tube when you are awake, you can breathe on your own, and your heart is working correctly       · When your ET tube is removed, it is important to cough, and deep breathe  This will decrease your risk for a lung infection  It is also important to walk around as soon as healthcare providers tell you it is okay  This will help prevent blood clots       · You may have a drain in your bladder to empty your urine, and a drain in your chest to prevent blood from forming around your heart  You may have several IVs in your arm, neck, or chest  You may also have a temporary pacemaker to control your heartbeat       · You may have pain in your chest after surgery  Healthcare providers will give you pain medicine  They will also show you how to use a splint when you need to cough and turn in bed  A splint is a pillow or soft object that you can press lightly against your chest  This will decrease pain when you move or cough       · You may not be able to eat right away  You will be given ice chips and then clear liquids  If you do okay with those, you will be able to eat solid foods  Cardiac rehabilitation (rehab): You will need to go to cardiac rehab after surgery  Rehab is a program run by specialists who will help you safely strengthen your heart and prevent more heart disease  Follow up with your healthcare provider as directed: Write down your questions so you remember to ask them during your visits  © 2017 [employer] Information is for End User's use only and may not be sold, redistributed or otherwise used for commercial purposes  All illustrations and images included in CareNotes® are the copyrighted property of A [employer] M [employer]  or Bradley Emmanuel  The above information is an  only  It is not intended as medical advice for individual conditions or treatments   Talk to your doctor, nurse or pharmacist before following any medical regimen to see if it is safe and effective for you

## 2019-03-22 NOTE — PROGRESS NOTES
Heart Failure Service Progress Note - Omar Garcia 46 y o  male MRN: [id number]    Unit/Bed#: [facility] 412-01 Encounter: [id number]      Assessment:    Principal Problem:    NSTEMI (non-ST elevated myocardial infarction) ([facility] )  Active Problems:    Ischemic cardiomyopathy (severe EF 25%)    Closed traumatic nondisplaced fracture of one rib of right side    Acute subsegmental PE of LLE    Tobacco abuse    Bacteremia due to Staphylococcus aureus (MSSA)    Hepatitis C    TSH elevation    Hyperlipidemia    Essential hypertension    CAD (coronary artery disease)    Polysubstance abuse ([facility] )    On continuous oral anticoagulation    Transaminitis      Subjective:   Patient seen and examined  No significant events overnight  Objective: Intake/ Output: 1790/1275: +515  Weight: 216- stable  Tele:     # Newly diagnosed HFrEF, LVEF 25-30%, LVIDd 5 5 cm, NYHA II/III, ACC/AHA Stage C: Most likely driven by iCM given severe CAD as noted below  Possible component of toxin induced (+ meth on admission, but states this is first time)  Less likely stress and/or myocarditis  Will proceed with the following:  Diag:  --TTE 3/7/19: LVEF 25-30%, LVIDd 5 5 cm  Mildly reduced RVSF w/ mild RVE  LAE>GAIL  Trace TR  No significant valvular disease  --COLEMAN 3/11/19: No clear vegetations noted  --LHC: 90% prox LAD  100% Mid LAD  w/ R->L collaterals   80% prox RI  90% midRCA    --CT PE 3/6/19: Subsegmental LL PE    Neurohormonal Blockade:  --Beta-Blocker: metoprolol succinate 12 5 mg daily  --ACEi, ARB or ARNi: lisinopril 2 5 mg PO daily  --Aldosterone Receptor Blocker: Start spironolactone 12 5 mg PO daily tomorrow if SBP>100 mmHg  --Diuretic: Currently not requiring diuretics     Sudden Cardiac Death Risk Reduction:  --ICD: Given iCM, would favor LifeVest on D/C if patient is agreeable unless he has revascularization during this admission     Electrical Resynchronization:  --Candidacy for BiV device: Narrow QRS     Advanced Therapies: Will continue to monitor  Given recent heroin/meth abuse, not currently a candidate        # MSSA Bacteremia: Currently on Abx  No e/o endocarditis on COLEMAN  Cefazolin through 3/22 to complete 2 weeks of IV  # Severe 3v CAD: 90% prox LAD  100% Mid LAD  w/ R->L collaterals  80% prox RI  90% midRCA  # Injection drug abuse-patient previously denying he is currently using, however the current bacteremia is suspicious for ongoing drug use    # LLL subsegmental PE: Continue Xarelto for now; but can consider transition to Lovenox vs hep gtt given ongoing CT surgery workup  # HLD: atorvastatin 80 mg  # Hep C- PCR positive genotype 1a      Plan:  CT surgery follow up  Will follow up with HF as will be staying with his mother in [facility], can see me at [facility] cardiology office  Leaving today with [facility] (day, reason): Jensen catheter (day, reason):    Vitals: Blood pressure 111/60, pulse 75, temperature 97 8 °F (36 6 °C), temperature source Oral, resp  rate 18, height 6' (1 829 m), weight 98 3 kg (216 lb 11 4 oz), SpO2 96 %  , Body mass index is 29 39 kg/m² , I/O last 3 completed shifts: In: 9619 [P O :2170; IV Piggyback:100]  Out: 8662 [Urine:2525]  I/O this shift:  In: 270 [P O :220; IV Piggyback:50]  Out: -   Wt Readings from Last 3 Encounters:   03/22/19 98 3 kg (216 lb 11 4 oz)   03/07/19 102 kg (224 lb)       Intake/Output Summary (Last 24 hours) at 3/22/2019 1400  Last data filed at 3/22/2019 1234  Gross per 24 hour   Intake 1250 ml   Output 1275 ml   Net -25 ml     I/O last 3 completed shifts: In: 2270 [P O :2170; IV Piggyback:100]  Out: 2525 [Urine:2525]    No significant arrhythmias seen on telemetry review         Physical Exam:  Vitals:    03/22/19 0600 03/22/19 0752 03/22/19 0942 03/22/19 1125   BP:  104/57 108/75 111/60   BP Location:  Left arm  Left arm   Pulse:  68 70 75   Resp:  18  18   Temp:  97 7 °F (36 5 °C)  97 8 °F (36 6 °C)   TempSrc:  Oral  Oral   SpO2:  94%  96% Weight: 98 3 kg (216 lb 11 4 oz)      Height:           GEN: Elio  appears well, alert and oriented x 3, pleasant and cooperative   HEENT: pupils equal, round, and reactive to light; extraocular muscles intact  NECK: supple, no carotid bruits   HEART: regular rhythm, normal S1 and S2, no murmurs, clicks, gallops or rubs, JVP is    LUNGS: clear to auscultation bilaterally; no wheezes, rales, or rhonchi   ABDOMEN: normal bowel sounds, soft, no tenderness, no distention  EXTREMITIES: peripheral pulses normal; no clubbing, cyanosis, or edema  NEURO: no focal findings   SKIN: normal without suspicious lesions on exposed skin      Current Facility-Administered Medications:     aspirin chewable tablet 81 mg, 81 mg, Oral, Daily, Gaby Nova MD, 81 mg at 03/22/19 0942    atorvastatin (LIPITOR) tablet 80 mg, 80 mg, Oral, Daily With Julio C Medeiros MD, 80 mg at 03/21/19 1758    ceFAZolin (ANCEF) IVPB (premix) 2,000 mg, 2,000 mg, Intravenous, Once, Soy Cowan MD    guaiFENesin (MUCINEX) 12 hr tablet 600 mg, 600 mg, Oral, Q12H PRN, Jj Cadena MD    lisinopril (ZESTRIL) tablet 2 5 mg, 2 5 mg, Oral, Daily, Atif Rodriguez MD, 2 5 mg at 03/22/19 0943    metoprolol succinate (TOPROL-XL) 24 hr tablet 12 5 mg, 12 5 mg, Oral, Daily, Atif Rodriguez MD, 12 5 mg at 03/22/19 0943    nicotine (NICODERM CQ) 7 mg/24hr TD 24 hr patch 7 mg, 7 mg, Transdermal, Daily, Gaby Nova MD, 7 mg at 03/22/19 8026    rivaroxaban (XARELTO) tablet 15 mg, 15 mg, Oral, BID With Meals, Gaby Nova MD, 15 mg at 03/22/19 0942      Labs & Results:        Results from last 7 days   Lab Units 03/22/19  0547 03/20/19  0448 03/18/19  0520   WBC Thousand/uL 7 00 7 11 7 03   HEMOGLOBIN g/dL 14 2 14 0 13 1   HEMATOCRIT % 43 4 42 5 39 3   PLATELETS Thousands/uL 230 202 195         Results from last 7 days   Lab Units 03/22/19  0547 03/20/19  0448 03/18/19  0520   POTASSIUM mmol/L 4 1 4 4 4 0   CHLORIDE mmol/L 105 102 101   CO2 mmol/L 26 32 28   BUN mg/dL 16 10 10   CREATININE mg/dL 0 72 0 67 0 69   CALCIUM mg/dL 9 2 8 8 8 7           Counseling / Coordination of Care  Total floor / unit time spent today 25 minutes  Greater than 50% of total time was spent with the patient and / or family counseling and / or coordination of care  A description of the counseling / coordination of care: 15  Thank you for the opportunity to participate in the care of this patient  Daria LU    Director Heart Failure/ Medical Director [address] [place] [address]

## 2019-03-22 NOTE — PROGRESS NOTES
IM Residency Progress Note   Unit/Bed#: [facility] 412-01 Encounter: [id number]  SOD Team B       Unruly Sanchez 46 y o  male [id number]    Hospital Stay Days: 10      Assessment/Plan:    1  NSTEMI  · Metoprolol succinate 12 5 mg daily  · Atorvastatin 80 mg daily  · Aspirin 81 mg daily  · Lisinopril 2 5 mg daily    Will start Spironolactone 12 5 mg PO daily today if SBP>100 mmHg  · CT surgery recommending outpatient CABG after completion of IV antibiotics     2  Acute Subsegmental PE of LLL  · Xarelto 15 mg BID  · Continue to monitor clinically     3  MSSA Bacteremia   · Cefazolin 2000 mg IV q8 hours until 3/22/2019  · Monitor fever curves and WBC count    Will pull PICC line following last abx dose on 3/22/2019     4  Essential Hypertension  · Lisinopril 2 5 mg daily   · Continue cardiopulmonary monitoring     5  Hyperlipidemia  · Atorvastatin to 80 mg daily     6  Ischemic Cardiomyopathy with an EF of 25%  · Monitor for signs of volume overload  · Life Vest fitted and delivered to patient    7  Hepatitis-C  · Hepatitis-C PCR positive genotype 1a  · Will need outpatient follow-up with GI      Disposition: Inpatient      Subjective:   Patient seen and examined at bedside  No acute events overnight  Denies chest pain, SOB, nausea, vomiting, diarrhea, fevers/chills  Life Vest fitted and delivered to patient on 3/21  PICC line to be removed today following last antibiotic dose  Patient will follow-up with CT surgery for CABG          Vitals: Temp (24hrs), Av 1 °F (36 7 °C), Min:97 5 °F (36 4 °C), Max:98 8 °F (37 1 °C)  Current: Temperature: 97 9 °F (36 6 °C)  Vitals:    19 1900 19 2359 19 0244 19 0600   BP: 104/64 98/56 (!) 92/46    BP Location: Left arm Left arm Left arm    Pulse: 75 81 82    Resp: 18 16 18    Temp: 98 2 °F (36 8 °C) 97 5 °F (36 4 °C) 97 9 °F (36 6 °C)    TempSrc: Oral Oral Oral    SpO2: 97% 95% 95%    Weight:    98 3 kg (216 lb 11 4 oz)   Height:        Body mass index is 29 39 kg/m²  I/O last 24 hours: In: 0257 [P O :1690; IV Piggyback:100]  Out: 0172 [Urine:1275]      Physical Exam   Constitutional: He is oriented to person, place, and time  He appears well-developed and well-nourished  No distress  HENT:   Head: Normocephalic and atraumatic  Nose: Nose normal    Mouth/Throat: No oropharyngeal exudate  Eyes: Conjunctivae are normal  Right eye exhibits no discharge  Left eye exhibits no discharge  No scleral icterus  Neck: Neck supple  No JVD present  Cardiovascular: Normal rate, regular rhythm, normal heart sounds and intact distal pulses  Exam reveals no gallop and no friction rub  No murmur heard  Pulmonary/Chest: Effort normal and breath sounds normal  No respiratory distress  He has no wheezes  He has no rales  Abdominal: Soft  Bowel sounds are normal  He exhibits no distension  There is no tenderness  There is no rebound and no guarding  Musculoskeletal: Normal range of motion  He exhibits no edema  Neurological: He is alert and oriented to person, place, and time  Skin: Skin is warm and dry  He is not diaphoretic  No erythema  Psychiatric: He has a normal mood and affect         Invasive Devices     Peripherally Inserted Central Catheter Line            PICC Line 61/89/18 Right Basilic 7 days                Labs:   Recent Results (from the past 24 hour(s))   CBC and differential    Collection Time: 03/22/19  5:47 AM   Result Value Ref Range    WBC 7 00 4 31 - 10 16 Thousand/uL    RBC 4 75 3 88 - 5 62 Million/uL    Hemoglobin 14 2 12 0 - 17 0 g/dL    Hematocrit 43 4 36 5 - 49 3 %    MCV 91 82 - 98 fL    MCH 29 9 26 8 - 34 3 pg    MCHC 32 7 31 4 - 37 4 g/dL    RDW 13 3 11 6 - 15 1 %    MPV 13 1 (H) 8 9 - 12 7 fL    Platelets 731 125 - 938 Thousands/uL    nRBC 0 /100 WBCs    Neutrophils Relative 50 43 - 75 %    Immat GRANS % 0 0 - 2 %    Lymphocytes Relative 35 14 - 44 %    Monocytes Relative 9 4 - 12 %    Eosinophils Relative 5 0 - 6 %    Basophils Relative 1 0 - 1 %    Neutrophils Absolute 3 47 1 85 - 7 62 Thousands/µL    Immature Grans Absolute 0 02 0 00 - 0 20 Thousand/uL    Lymphocytes Absolute 2 45 0 60 - 4 47 Thousands/µL    Monocytes Absolute 0 64 0 17 - 1 22 Thousand/µL    Eosinophils Absolute 0 32 0 00 - 0 61 Thousand/µL    Basophils Absolute 0 10 0 00 - 0 10 Thousands/µL   Basic metabolic panel    Collection Time: 03/22/19  5:47 AM   Result Value Ref Range    Sodium 136 136 - 145 mmol/L    Potassium 4 1 3 5 - 5 3 mmol/L    Chloride 105 100 - 108 mmol/L    CO2 26 21 - 32 mmol/L    ANION GAP 5 4 - 13 mmol/L    BUN 16 5 - 25 mg/dL    Creatinine 0 72 0 60 - 1 30 mg/dL    Glucose 94 65 - 140 mg/dL    Calcium 9 2 8 3 - 10 1 mg/dL    eGFR 118 ml/min/1 73sq m   Magnesium    Collection Time: 03/22/19  5:47 AM   Result Value Ref Range    Magnesium 2 2 1 6 - 2 6 mg/dL   Phosphorus    Collection Time: 03/22/19  5:47 AM   Result Value Ref Range    Phosphorus 4 2 2 7 - 4 5 mg/dL       Radiology Results: I have personally reviewed pertinent reports  Other Diagnostic Testing:   I have personally reviewed pertinent reports          Active Meds:   Current Facility-Administered Medications   Medication Dose Route Frequency    aspirin chewable tablet 81 mg  81 mg Oral Daily    atorvastatin (LIPITOR) tablet 80 mg  80 mg Oral Daily With Dinner    ceFAZolin (ANCEF) IVPB (premix) 2,000 mg  2,000 mg Intravenous Q8H    guaiFENesin (MUCINEX) 12 hr tablet 600 mg  600 mg Oral Q12H PRN    lisinopril (ZESTRIL) tablet 2 5 mg  2 5 mg Oral Daily    metoprolol succinate (TOPROL-XL) 24 hr tablet 12 5 mg  12 5 mg Oral Daily    nicotine (NICODERM CQ) 7 mg/24hr TD 24 hr patch 7 mg  7 mg Transdermal Daily    rivaroxaban (XARELTO) tablet 15 mg  15 mg Oral BID With Meals         VTE Pharmacologic Prophylaxis: Reason for no pharmacologic prophylaxis DOAC  VTE Mechanical Prophylaxis: sequential compression device    Lugene Yee, DO

## 2019-03-28 ENCOUNTER — TELEPHONE (OUTPATIENT)
Dept: CARDIOLOGY CLINIC | Facility: CLINIC | Age: 39
End: 2019-03-28

## 2019-03-28 NOTE — TELEPHONE ENCOUNTER
Phone call from "mother of Enrike Oakes", who left a message on prescription line that "they have been calling all week leaving messages on our prescription line stating Enrike Oakes needs refills on xarelto and we have been ignoring their calls, and have not responded, nor refilled this prescription as requested " [place] ) have been on prescription line all week doing refills and never got a call for a patient with this name asking for refills, let alone multiple calls all week  Mother states she is "an RN and knows how serious this medication is and that he cannot be without it, so why are we not calling this in?"  After a long review of his chart, I see that at least two prescriptions were sent to [facility] by PCP on 3/22/19, and admitting Internal medicine physician who discharged patient on 3/22/19  See medications in Epic under chart review  Patient states he is staying in [facility] and never thought to tell doctors to call into [facility]  It also looks like he should not be out of this medication as of yet, looking for refills, and also confirmed with Dr Marcelino Durán, who is seeing him as a phfu on 4/22/19, that we were not the prescribing physician for this medication during hospital stay, therefore, the doctors who did refill at [facility] were the correct physicians for refills  I spoke with Enrike Oakes and explained all this and left a message on his mother's cell voice message regarding the conversation  Not sure who they were actually calling all week but this looks like it was done on 3/22 twice, and sitting at a [facility] pharmacy in [facility]

## 2019-03-29 ENCOUNTER — APPOINTMENT (OUTPATIENT)
Dept: LAB | Facility: CLINIC | Age: 39
End: 2019-03-29
Payer: COMMERCIAL

## 2019-03-29 ENCOUNTER — TELEPHONE (OUTPATIENT)
Dept: INFECTIOUS DISEASES | Facility: CLINIC | Age: 39
End: 2019-03-29

## 2019-03-29 DIAGNOSIS — R78.81 BACTEREMIA: Primary | ICD-10-CM

## 2019-03-29 PROCEDURE — 36415 COLL VENOUS BLD VENIPUNCTURE: CPT

## 2019-03-29 PROCEDURE — 87040 BLOOD CULTURE FOR BACTERIA: CPT

## 2019-03-29 NOTE — TELEPHONE ENCOUNTER
Called to remind pt to have blood cx drawn  He is going to go today to a [facility]

## 2019-04-03 LAB — BACTERIA BLD CULT: NORMAL

## 2019-04-11 ENCOUNTER — OFFICE VISIT (OUTPATIENT)
Dept: CARDIOLOGY CLINIC | Facility: CLINIC | Age: 39
End: 2019-04-11

## 2019-04-11 VITALS
DIASTOLIC BLOOD PRESSURE: 70 MMHG | HEART RATE: 76 BPM | BODY MASS INDEX: 29.17 KG/M2 | HEIGHT: 72 IN | SYSTOLIC BLOOD PRESSURE: 112 MMHG | WEIGHT: 215.4 LBS

## 2019-04-11 DIAGNOSIS — I25.5 ISCHEMIC CARDIOMYOPATHY: ICD-10-CM

## 2019-04-11 DIAGNOSIS — I10 ESSENTIAL HYPERTENSION: ICD-10-CM

## 2019-04-11 DIAGNOSIS — I50.22 CHRONIC SYSTOLIC CHF (CONGESTIVE HEART FAILURE), NYHA CLASS 2 (HCC): ICD-10-CM

## 2019-04-11 DIAGNOSIS — I25.10 CORONARY ARTERY DISEASE INVOLVING NATIVE CORONARY ARTERY OF NATIVE HEART WITHOUT ANGINA PECTORIS: Primary | ICD-10-CM

## 2019-04-11 DIAGNOSIS — I26.09 OTHER ACUTE PULMONARY EMBOLISM WITH ACUTE COR PULMONALE (HCC): ICD-10-CM

## 2019-04-11 DIAGNOSIS — I21.4 NSTEMI (NON-ST ELEVATED MYOCARDIAL INFARCTION) (HCC): ICD-10-CM

## 2019-04-11 DIAGNOSIS — E78.5 HYPERLIPIDEMIA LDL GOAL <70: ICD-10-CM

## 2019-04-11 DIAGNOSIS — I26.01 ACUTE SEPTIC PULMONARY EMBOLISM WITH ACUTE COR PULMONALE (HCC): ICD-10-CM

## 2019-04-11 PROCEDURE — 99214 OFFICE O/P EST MOD 30 MIN: CPT | Performed by: INTERNAL MEDICINE

## 2019-04-11 RX ORDER — LISINOPRIL 2.5 MG/1
2.5 TABLET ORAL DAILY
Qty: 30 TABLET | Refills: 3 | Status: SHIPPED | OUTPATIENT
Start: 2019-04-11

## 2019-04-11 RX ORDER — ATORVASTATIN CALCIUM 80 MG/1
80 TABLET, FILM COATED ORAL
Qty: 30 TABLET | Refills: 3 | Status: SHIPPED | OUTPATIENT
Start: 2019-04-11 | End: 2019-04-11 | Stop reason: SDUPTHER

## 2019-04-11 RX ORDER — METOPROLOL SUCCINATE 25 MG/1
12.5 TABLET, EXTENDED RELEASE ORAL DAILY
Qty: 30 TABLET | Refills: 3 | Status: SHIPPED | OUTPATIENT
Start: 2019-04-11

## 2019-04-11 RX ORDER — ATORVASTATIN CALCIUM 80 MG/1
80 TABLET, FILM COATED ORAL
Qty: 30 TABLET | Refills: 3 | Status: SHIPPED | OUTPATIENT
Start: 2019-04-11 | End: 2019-08-18 | Stop reason: SDUPTHER

## 2019-04-12 ENCOUNTER — TELEPHONE (OUTPATIENT)
Dept: CARDIOLOGY CLINIC | Facility: CLINIC | Age: 39
End: 2019-04-12

## 2019-08-18 DIAGNOSIS — I21.4 NSTEMI (NON-ST ELEVATED MYOCARDIAL INFARCTION) (HCC): ICD-10-CM

## 2019-08-18 DIAGNOSIS — I26.01 ACUTE SEPTIC PULMONARY EMBOLISM WITH ACUTE COR PULMONALE (HCC): ICD-10-CM

## 2019-08-18 RX ORDER — ATORVASTATIN CALCIUM 80 MG/1
80 TABLET, FILM COATED ORAL
Qty: 30 TABLET | Refills: 3 | Status: SHIPPED | OUTPATIENT
Start: 2019-08-18 | End: 2019-12-14 | Stop reason: SDUPTHER

## 2019-08-18 RX ORDER — RIVAROXABAN 20 MG/1
TABLET, FILM COATED ORAL
Qty: 30 TABLET | Refills: 3 | Status: SHIPPED | OUTPATIENT
Start: 2019-08-18 | End: 2019-12-14 | Stop reason: SDUPTHER

## 2019-12-14 DIAGNOSIS — I26.01 ACUTE SEPTIC PULMONARY EMBOLISM WITH ACUTE COR PULMONALE (HCC): ICD-10-CM

## 2019-12-14 DIAGNOSIS — I21.4 NSTEMI (NON-ST ELEVATED MYOCARDIAL INFARCTION) (HCC): ICD-10-CM

## 2019-12-14 RX ORDER — RIVAROXABAN 20 MG/1
TABLET, FILM COATED ORAL
Qty: 30 TABLET | Refills: 3 | Status: SHIPPED | OUTPATIENT
Start: 2019-12-14

## 2019-12-14 RX ORDER — ATORVASTATIN CALCIUM 80 MG/1
80 TABLET, FILM COATED ORAL
Qty: 30 TABLET | Refills: 3 | Status: SHIPPED | OUTPATIENT
Start: 2019-12-14 | End: 2020-01-11

## 2020-01-11 DIAGNOSIS — I21.4 NSTEMI (NON-ST ELEVATED MYOCARDIAL INFARCTION) (HCC): ICD-10-CM

## 2020-01-11 RX ORDER — ROSUVASTATIN CALCIUM 40 MG/1
TABLET, COATED ORAL
Qty: 90 TABLET | Refills: 3 | Status: SHIPPED | OUTPATIENT
Start: 2020-01-11 | End: 2021-02-02

## 2021-02-02 DIAGNOSIS — I21.4 NSTEMI (NON-ST ELEVATED MYOCARDIAL INFARCTION) (HCC): ICD-10-CM

## 2021-02-02 RX ORDER — ROSUVASTATIN CALCIUM 40 MG/1
TABLET, COATED ORAL
Qty: 30 TABLET | Refills: 5 | Status: SHIPPED | OUTPATIENT
Start: 2021-02-02 | End: 2021-09-06

## 2021-09-04 DIAGNOSIS — I21.4 NSTEMI (NON-ST ELEVATED MYOCARDIAL INFARCTION) (HCC): ICD-10-CM

## 2021-09-06 RX ORDER — ROSUVASTATIN CALCIUM 40 MG/1
TABLET, COATED ORAL
Qty: 30 TABLET | Refills: 5 | Status: SHIPPED | OUTPATIENT
Start: 2021-09-06 | End: 2022-03-28

## 2022-03-26 DIAGNOSIS — I21.4 NSTEMI (NON-ST ELEVATED MYOCARDIAL INFARCTION) (HCC): ICD-10-CM

## 2022-03-28 RX ORDER — ROSUVASTATIN CALCIUM 40 MG/1
TABLET, COATED ORAL
Qty: 30 TABLET | Refills: 5 | Status: SHIPPED | OUTPATIENT
Start: 2022-03-28

## 2022-11-18 DIAGNOSIS — I21.4 NSTEMI (NON-ST ELEVATED MYOCARDIAL INFARCTION) (HCC): ICD-10-CM

## 2022-11-18 RX ORDER — ROSUVASTATIN CALCIUM 40 MG/1
TABLET, COATED ORAL
Qty: 30 TABLET | Refills: 5 | Status: SHIPPED | OUTPATIENT
Start: 2022-11-18

## 2023-01-04 NOTE — PROGRESS NOTES
Cardiology Progress Note    Assessment/Plan   NSTEMI  Severe systolic cardiomyopathy with regional wall motion abnormalities  PNA  Methamphetamine/Opiate abuse  Staphylococcus aureus bacteremia  Pulmonary embolism  Tobacco abuse      For NSTEMI/Systolic cardiomyopathy - patient will need invasive angiography to determine etiology of cardiomyopathy  Troponin could be due to demand ischemia in the setting of infection, PE, etc, but combined with CMO, tobacco abuse and strong family history, ischemic burden should be assessed  Change metoprolol tartrate to succinate, 25 mg daily  Add lisinopril 2 5 mg daily  Agree with heparin for PE/NSTEMI  Continue treatment of bacteremia, surveillance per ID  No evidence of rampant valve destruction on TTE  Low threshold for COLEMAN to r/o endocarditis if clinical situation warrants  LOS: 2 days     Subjective   No acute events  Blood cultures show GPCs --> S  Aureus (sensitivities pending) in 1/2  Echocardiogram shows severe systolic cardiomyopathy  Troponins peaked at 4 7, falling  Abx changed to vancomycin  Objective     Vital signs in last 24 hours:  Temp:  [98 1 °F (36 7 °C)-98 4 °F (36 9 °C)] 98 1 °F (36 7 °C)  HR:  [62-71] 71  Resp:  [18] 18  BP: (101-125)/(63-78) 101/66    Physical Exam:  General: AAOx3, NAD  HEENT: Normocephalic/Atraumatic, No thyromegaly, lymphadenopathy, JVD or carotid bruits  Cardiovasc: Regular rhythm with a normal rate  No murmurs, rubs or gallops  Radial, carotid, femoral pulses are 2+/4  Chest/Pulm: CTAB, no wheezes, rales or rhonchi  Abdomen: +BSx4, Soft, non-tender  No tenderness, organomegaly, rebound, rigidity or guarding  Extremities: No edema        Scheduled Meds:  Current Facility-Administered Medications:  aspirin 81 mg Oral Daily Delano Mcdonald MD    atorvastatin 10 mg Oral Daily With Nalini West MD    guaiFENesin 600 mg Oral Q12H Zac Rodriguez MD    heparin (porcine) 3-30 Units/kg/hr Medication:tizanadine     Last Seen:9/22/22    Last Refilled:12/6/22 30 tabs w 0 refills    Next Appointment:3/22/23             (Order-Specific) Intravenous Titrated Arie Yoder PA-C Last Rate: 14 Units/kg/hr (03/09/19 7743)   heparin (porcine) 4,200 Units Intravenous PRN Arie Yoder PA-C    heparin (porcine) 8,400 Units Intravenous PRN Arie Yoder PA-C    metoprolol tartrate 12 5 mg Oral Q12H [address] Pillo Vasquez PA-C    nicotine 7 mg Transdermal Daily Meliza Ely PA-C    vancomycin 15 mg/kg (Adjusted) Intravenous Q8H Corinne Perry MD Last Rate: 1,250 mg (03/09/19 0751)     Continuous Infusions:  heparin (porcine) 3-30 Units/kg/hr (Order-Specific) Last Rate: 14 Units/kg/hr (03/09/19 0624)     PRN Meds: heparin (porcine)    heparin (porcine)      Cardiographics  TTE 3/7/2019  SUMMARY  LEFT VENTRICLE:  The ventricle was mildly dilated  Ejection fraction was estimated to be 25 % to 30%  There was diffuse hypokinesis with svere hypkinesis of the septum and inferior wall  The septum is thinned out      RIGHT VENTRICLE:  The ventricle was mildly dilated  Systolic function was mildly reduced  Estimated peak pressure was at least 30 mmHg      LEFT ATRIUM:  The atrium was mildly to moderately dilated      RIGHT ATRIUM:  The atrium was dilated      TRICUSPID VALVE:  There was trace regurgitation  Imaging  CTPE 3/6/2019  IMPRESSION:  Subsegmental branch left lower lobe pulmonary emboli     Diffuse airspace opacities within the right upper lobe and left lung  Findings may represent multifocal pneumonia versus pulmonary edema      Right lateral 7th rib nondisplaced fracture      Lab Review   Results for Kelley Mas (MRN [id number]) as of 3/9/2019 07:45   3/9/2019 05:05   Sodium 139   Potassium 4 3   Chloride 105   CO2 26   Anion Gap 8   BUN 6   Creatinine 0 59 (L)   Glucose, Random 109   Calcium 8 5   eGFR 128     Results for Kelley Mas (MRN [id number]) as of 3/9/2019 07:45   3/9/2019 05:05   WBC 5 24   Red Blood Cell Count 4 32   Hemoglobin 13 0   HCT 40 0   MCV 93   MCH 30 1   MCHC 32 5   RDW 13 9 Platelet Count 405   MPV 11 6
